# Patient Record
Sex: FEMALE | Race: WHITE | NOT HISPANIC OR LATINO | Employment: OTHER | URBAN - METROPOLITAN AREA
[De-identification: names, ages, dates, MRNs, and addresses within clinical notes are randomized per-mention and may not be internally consistent; named-entity substitution may affect disease eponyms.]

---

## 2018-01-15 ENCOUNTER — OFFICE VISIT CONVERTED (OUTPATIENT)
Dept: FAMILY MEDICINE CLINIC | Facility: CLINIC | Age: 83
End: 2018-01-15
Attending: FAMILY MEDICINE

## 2018-01-25 ENCOUNTER — OFFICE VISIT CONVERTED (OUTPATIENT)
Dept: SURGERY | Facility: CLINIC | Age: 83
End: 2018-01-25
Attending: NURSE PRACTITIONER

## 2018-01-25 ENCOUNTER — CONVERSION ENCOUNTER (OUTPATIENT)
Dept: SURGERY | Facility: CLINIC | Age: 83
End: 2018-01-25

## 2018-02-23 ENCOUNTER — OFFICE VISIT CONVERTED (OUTPATIENT)
Dept: FAMILY MEDICINE CLINIC | Facility: CLINIC | Age: 83
End: 2018-02-23
Attending: PHYSICIAN ASSISTANT

## 2018-02-26 ENCOUNTER — OFFICE VISIT CONVERTED (OUTPATIENT)
Dept: CARDIOLOGY | Facility: CLINIC | Age: 83
End: 2018-02-26
Attending: INTERNAL MEDICINE

## 2018-03-14 ENCOUNTER — OFFICE VISIT CONVERTED (OUTPATIENT)
Dept: NEUROLOGY | Facility: CLINIC | Age: 83
End: 2018-03-14
Attending: PSYCHIATRY & NEUROLOGY

## 2018-03-14 ENCOUNTER — CONVERSION ENCOUNTER (OUTPATIENT)
Dept: NEUROLOGY | Facility: CLINIC | Age: 83
End: 2018-03-14

## 2018-03-20 ENCOUNTER — OFFICE VISIT CONVERTED (OUTPATIENT)
Dept: FAMILY MEDICINE CLINIC | Facility: CLINIC | Age: 83
End: 2018-03-20
Attending: FAMILY MEDICINE

## 2018-04-10 ENCOUNTER — OFFICE VISIT CONVERTED (OUTPATIENT)
Dept: SURGERY | Facility: CLINIC | Age: 83
End: 2018-04-10
Attending: NURSE PRACTITIONER

## 2018-04-23 ENCOUNTER — CONVERSION ENCOUNTER (OUTPATIENT)
Dept: FAMILY MEDICINE CLINIC | Facility: CLINIC | Age: 83
End: 2018-04-23

## 2018-04-23 ENCOUNTER — OFFICE VISIT CONVERTED (OUTPATIENT)
Dept: FAMILY MEDICINE CLINIC | Facility: CLINIC | Age: 83
End: 2018-04-23
Attending: NURSE PRACTITIONER

## 2018-06-15 ENCOUNTER — CONVERSION ENCOUNTER (OUTPATIENT)
Dept: FAMILY MEDICINE CLINIC | Facility: CLINIC | Age: 83
End: 2018-06-15

## 2018-06-15 ENCOUNTER — OFFICE VISIT CONVERTED (OUTPATIENT)
Dept: FAMILY MEDICINE CLINIC | Facility: CLINIC | Age: 83
End: 2018-06-15
Attending: PHYSICIAN ASSISTANT

## 2018-07-06 ENCOUNTER — OFFICE VISIT CONVERTED (OUTPATIENT)
Dept: FAMILY MEDICINE CLINIC | Facility: CLINIC | Age: 83
End: 2018-07-06
Attending: FAMILY MEDICINE

## 2018-07-25 ENCOUNTER — CONVERSION ENCOUNTER (OUTPATIENT)
Dept: CARDIOLOGY | Facility: CLINIC | Age: 83
End: 2018-07-25

## 2018-07-25 ENCOUNTER — CONVERSION ENCOUNTER (OUTPATIENT)
Dept: CARDIOLOGY | Facility: CLINIC | Age: 83
End: 2018-07-25
Attending: INTERNAL MEDICINE

## 2018-08-14 ENCOUNTER — CONVERSION ENCOUNTER (OUTPATIENT)
Dept: FAMILY MEDICINE CLINIC | Facility: CLINIC | Age: 83
End: 2018-08-14

## 2018-08-14 ENCOUNTER — OFFICE VISIT CONVERTED (OUTPATIENT)
Dept: FAMILY MEDICINE CLINIC | Facility: CLINIC | Age: 83
End: 2018-08-14
Attending: FAMILY MEDICINE

## 2018-10-15 ENCOUNTER — CONVERSION ENCOUNTER (OUTPATIENT)
Dept: OTHER | Facility: HOSPITAL | Age: 83
End: 2018-10-15

## 2018-10-15 ENCOUNTER — OFFICE VISIT CONVERTED (OUTPATIENT)
Dept: FAMILY MEDICINE CLINIC | Facility: CLINIC | Age: 83
End: 2018-10-15
Attending: FAMILY MEDICINE

## 2018-10-19 ENCOUNTER — OFFICE VISIT CONVERTED (OUTPATIENT)
Dept: FAMILY MEDICINE CLINIC | Facility: CLINIC | Age: 83
End: 2018-10-19
Attending: FAMILY MEDICINE

## 2018-10-25 ENCOUNTER — OFFICE VISIT CONVERTED (OUTPATIENT)
Dept: SURGERY | Facility: CLINIC | Age: 83
End: 2018-10-25
Attending: NURSE PRACTITIONER

## 2018-10-30 ENCOUNTER — CONVERSION ENCOUNTER (OUTPATIENT)
Dept: SURGERY | Facility: CLINIC | Age: 83
End: 2018-10-30

## 2018-10-30 ENCOUNTER — OFFICE VISIT CONVERTED (OUTPATIENT)
Dept: SURGERY | Facility: CLINIC | Age: 83
End: 2018-10-30
Attending: SURGERY

## 2018-11-13 ENCOUNTER — OFFICE VISIT CONVERTED (OUTPATIENT)
Dept: FAMILY MEDICINE CLINIC | Facility: CLINIC | Age: 83
End: 2018-11-13
Attending: FAMILY MEDICINE

## 2019-01-16 ENCOUNTER — OFFICE VISIT CONVERTED (OUTPATIENT)
Dept: FAMILY MEDICINE CLINIC | Facility: CLINIC | Age: 84
End: 2019-01-16
Attending: NURSE PRACTITIONER

## 2019-01-16 ENCOUNTER — CONVERSION ENCOUNTER (OUTPATIENT)
Dept: FAMILY MEDICINE CLINIC | Facility: CLINIC | Age: 84
End: 2019-01-16

## 2019-01-28 ENCOUNTER — HOSPITAL ENCOUNTER (OUTPATIENT)
Dept: LAB | Facility: HOSPITAL | Age: 84
Discharge: HOME | End: 2019-01-28
Attending: INTERNAL MEDICINE

## 2019-01-28 LAB
ALBUMIN SERPL-MCNC: 4.1 G/DL (ref 3.5–5)
ALBUMIN/GLOB SERPL: 1.2 {RATIO} (ref 1.4–2.6)
ALP SERPL-CCNC: 50 U/L (ref 43–160)
ALT SERPL-CCNC: 17 U/L (ref 10–40)
ANION GAP SERPL CALC-SCNC: 16 MMOL/L (ref 8–19)
AST SERPL-CCNC: 22 U/L (ref 15–50)
BILIRUB SERPL-MCNC: 0.37 MG/DL (ref 0.2–1.3)
BUN SERPL-MCNC: 28 MG/DL (ref 5–25)
BUN/CREAT SERPL: 22 {RATIO} (ref 6–20)
CALCIUM SERPL-MCNC: 9.5 MG/DL (ref 8.7–10.4)
CHLORIDE SERPL-SCNC: 105 MMOL/L (ref 99–111)
CONV CO2: 25 MMOL/L (ref 22–32)
CONV TOTAL PROTEIN: 7.5 G/DL (ref 6.3–8.2)
CREAT UR-MCNC: 1.3 MG/DL (ref 0.5–0.9)
GFR SERPLBLD BASED ON 1.73 SQ M-ARVRAT: 38 ML/MIN/{1.73_M2}
GLOBULIN UR ELPH-MCNC: 3.4 G/DL (ref 2–3.5)
GLUCOSE SERPL-MCNC: 96 MG/DL (ref 65–99)
OSMOLALITY SERPL CALC.SUM OF ELEC: 299 MOSM/KG (ref 273–304)
POTASSIUM SERPL-SCNC: 3.9 MMOL/L (ref 3.5–5.3)
SODIUM SERPL-SCNC: 142 MMOL/L (ref 135–147)

## 2019-02-04 ENCOUNTER — CONVERSION ENCOUNTER (OUTPATIENT)
Dept: CARDIOLOGY | Facility: CLINIC | Age: 84
End: 2019-02-04
Attending: INTERNAL MEDICINE

## 2019-02-04 ENCOUNTER — CONVERSION ENCOUNTER (OUTPATIENT)
Dept: CARDIOLOGY | Facility: CLINIC | Age: 84
End: 2019-02-04

## 2019-02-15 ENCOUNTER — OFFICE VISIT CONVERTED (OUTPATIENT)
Dept: FAMILY MEDICINE CLINIC | Facility: CLINIC | Age: 84
End: 2019-02-15
Attending: FAMILY MEDICINE

## 2019-05-13 ENCOUNTER — OFFICE VISIT CONVERTED (OUTPATIENT)
Dept: FAMILY MEDICINE CLINIC | Facility: CLINIC | Age: 84
End: 2019-05-13
Attending: NURSE PRACTITIONER

## 2019-05-13 ENCOUNTER — HOSPITAL ENCOUNTER (OUTPATIENT)
Dept: FAMILY MEDICINE CLINIC | Facility: CLINIC | Age: 84
Discharge: HOME | End: 2019-05-13
Attending: NURSE PRACTITIONER

## 2019-05-13 ENCOUNTER — CONVERSION ENCOUNTER (OUTPATIENT)
Dept: FAMILY MEDICINE CLINIC | Facility: CLINIC | Age: 84
End: 2019-05-13

## 2019-05-13 ENCOUNTER — HOSPITAL ENCOUNTER (OUTPATIENT)
Dept: LAB | Facility: HOSPITAL | Age: 84
Discharge: HOME | End: 2019-05-13
Attending: NURSE PRACTITIONER

## 2019-05-13 LAB
ALBUMIN SERPL-MCNC: 4.4 G/DL (ref 3.5–5)
ALBUMIN/GLOB SERPL: 1.2 {RATIO} (ref 1.4–2.6)
ALP SERPL-CCNC: 58 U/L (ref 43–160)
ALT SERPL-CCNC: 15 U/L (ref 10–40)
ANION GAP SERPL CALC-SCNC: 17 MMOL/L (ref 8–19)
AST SERPL-CCNC: 24 U/L (ref 15–50)
BASOPHILS # BLD AUTO: 0.06 10*3/UL (ref 0–0.2)
BASOPHILS NFR BLD AUTO: 0.9 % (ref 0–3)
BILIRUB SERPL-MCNC: 0.35 MG/DL (ref 0.2–1.3)
BUN SERPL-MCNC: 28 MG/DL (ref 5–25)
BUN/CREAT SERPL: 24 {RATIO} (ref 6–20)
CALCIUM SERPL-MCNC: 9.7 MG/DL (ref 8.7–10.4)
CHLORIDE SERPL-SCNC: 99 MMOL/L (ref 99–111)
CONV ABS IMM GRAN: 0.01 10*3/UL (ref 0–0.2)
CONV CO2: 25 MMOL/L (ref 22–32)
CONV IMMATURE GRAN: 0.1 % (ref 0–1.8)
CONV TOTAL PROTEIN: 8 G/DL (ref 6.3–8.2)
CREAT UR-MCNC: 1.19 MG/DL (ref 0.5–0.9)
DEPRECATED RDW RBC AUTO: 50.1 FL (ref 36.4–46.3)
EOSINOPHIL # BLD AUTO: 0.25 10*3/UL (ref 0–0.7)
EOSINOPHIL # BLD AUTO: 3.6 % (ref 0–7)
ERYTHROCYTE [DISTWIDTH] IN BLOOD BY AUTOMATED COUNT: 12.9 % (ref 11.7–14.4)
GFR SERPLBLD BASED ON 1.73 SQ M-ARVRAT: 42 ML/MIN/{1.73_M2}
GLOBULIN UR ELPH-MCNC: 3.6 G/DL (ref 2–3.5)
GLUCOSE SERPL-MCNC: 89 MG/DL (ref 65–99)
HBA1C MFR BLD: 13.3 G/DL (ref 12–16)
HCT VFR BLD AUTO: 41.8 % (ref 37–47)
LYMPHOCYTES # BLD AUTO: 1.72 10*3/UL (ref 1–5)
MCH RBC QN AUTO: 33.4 PG (ref 27–31)
MCHC RBC AUTO-ENTMCNC: 31.8 G/DL (ref 33–37)
MCV RBC AUTO: 105 FL (ref 81–99)
MONOCYTES # BLD AUTO: 0.73 10*3/UL (ref 0.2–1.2)
MONOCYTES NFR BLD AUTO: 10.6 % (ref 3–10)
NEUTROPHILS # BLD AUTO: 4.11 10*3/UL (ref 2–8)
NEUTROPHILS NFR BLD AUTO: 59.8 % (ref 30–85)
NRBC CBCN: 0 % (ref 0–0.7)
OSMOLALITY SERPL CALC.SUM OF ELEC: 289 MOSM/KG (ref 273–304)
PLATELET # BLD AUTO: 212 10*3/UL (ref 130–400)
PMV BLD AUTO: 11.8 FL (ref 9.4–12.3)
POTASSIUM SERPL-SCNC: 3.8 MMOL/L (ref 3.5–5.3)
RBC # BLD AUTO: 3.98 10*6/UL (ref 4.2–5.4)
SODIUM SERPL-SCNC: 137 MMOL/L (ref 135–147)
VARIANT LYMPHS NFR BLD MANUAL: 25 % (ref 20–45)
WBC # BLD AUTO: 6.88 10*3/UL (ref 4.8–10.8)

## 2019-05-14 ENCOUNTER — HOSPITAL ENCOUNTER (OUTPATIENT)
Dept: LAB | Facility: HOSPITAL | Age: 84
Discharge: HOME | End: 2019-05-14
Attending: NURSE PRACTITIONER

## 2019-05-14 LAB
BASOPHILS # BLD AUTO: 0.06 10*3/UL (ref 0–0.2)
BASOPHILS # BLD: 1 % (ref 0–3)
BASOPHILS NFR BLD AUTO: 0.8 % (ref 0–3)
CONV ABS BANDS: 0 % (ref 1–5)
CONV ABS IMM GRAN: 0.01 10*3/UL (ref 0–0.2)
CONV IMMATURE GRAN: 0.1 % (ref 0–1.8)
CONV SEGMENTED NEUTROPHILS: 74 % (ref 45–70)
DEPRECATED RDW RBC AUTO: 50.4 FL (ref 36.4–46.3)
EOSINOPHIL # BLD AUTO: 0.31 10*3/UL (ref 0–0.7)
EOSINOPHIL # BLD AUTO: 4.2 % (ref 0–7)
EOSINOPHIL NFR BLD AUTO: 1 % (ref 0–7)
ERYTHROCYTE [DISTWIDTH] IN BLOOD BY AUTOMATED COUNT: 12.9 % (ref 11.7–14.4)
FOLATE SERPL-MCNC: >20 NG/ML (ref 4.8–20)
HBA1C MFR BLD: 12.5 G/DL (ref 12–16)
HCT VFR BLD AUTO: 40.3 % (ref 37–47)
LARGE PLATELETS: ABNORMAL
LYMPHOCYTES # BLD AUTO: 1.51 10*3/UL (ref 1–5)
MACROCYTES BLD QL SMEAR: SLIGHT
MCH RBC QN AUTO: 32.6 PG (ref 27–31)
MCHC RBC AUTO-ENTMCNC: 31 G/DL (ref 33–37)
MCV RBC AUTO: 105.2 FL (ref 81–99)
MONOCYTES # BLD AUTO: 0.71 10*3/UL (ref 0.2–1.2)
MONOCYTES NFR BLD AUTO: 9.7 % (ref 3–10)
MONOCYTES NFR BLD MANUAL: 8 % (ref 3–10)
NEUTROPHILS # BLD AUTO: 4.74 10*3/UL (ref 2–8)
NEUTROPHILS NFR BLD AUTO: 64.6 % (ref 30–85)
NRBC CBCN: 0 % (ref 0–0.7)
NUC CELL # PRT MANUAL: 0 /100{WBCS}
OVALOCYTES BLD QL SMEAR: SLIGHT
PLAT MORPH BLD: NORMAL
PLATELET # BLD AUTO: 204 10*3/UL (ref 130–400)
PMV BLD AUTO: 11.7 FL (ref 9.4–12.3)
RBC # BLD AUTO: 3.83 10*6/UL (ref 4.2–5.4)
RETICS # AUTO: 0.04 10*6/UL (ref 0.02–0.08)
RETICS/RBC NFR AUTO: 1.15 % (ref 0.5–1.7)
SMALL PLATELETS BLD QL SMEAR: ADEQUATE
VARIANT LYMPHS NFR BLD MANUAL: 16 % (ref 20–45)
VARIANT LYMPHS NFR BLD MANUAL: 20.6 % (ref 20–45)
VIT B12 SERPL-MCNC: 1892 PG/ML (ref 211–911)
WBC # BLD AUTO: 7.34 10*3/UL (ref 4.8–10.8)

## 2019-05-15 LAB — BACTERIA UR CULT: NORMAL

## 2019-05-24 ENCOUNTER — OFFICE VISIT CONVERTED (OUTPATIENT)
Dept: SURGERY | Facility: CLINIC | Age: 84
End: 2019-05-24
Attending: PHYSICIAN ASSISTANT

## 2019-05-24 ENCOUNTER — CONVERSION ENCOUNTER (OUTPATIENT)
Dept: SURGERY | Facility: CLINIC | Age: 84
End: 2019-05-24

## 2019-06-06 ENCOUNTER — OFFICE VISIT CONVERTED (OUTPATIENT)
Dept: OTOLARYNGOLOGY | Facility: CLINIC | Age: 84
End: 2019-06-06
Attending: OTOLARYNGOLOGY

## 2019-06-06 ENCOUNTER — CONVERSION ENCOUNTER (OUTPATIENT)
Dept: OTOLARYNGOLOGY | Facility: CLINIC | Age: 84
End: 2019-06-06

## 2019-06-24 ENCOUNTER — OFFICE VISIT CONVERTED (OUTPATIENT)
Dept: FAMILY MEDICINE CLINIC | Facility: CLINIC | Age: 84
End: 2019-06-24
Attending: NURSE PRACTITIONER

## 2019-06-24 ENCOUNTER — CONVERSION ENCOUNTER (OUTPATIENT)
Dept: FAMILY MEDICINE CLINIC | Facility: CLINIC | Age: 84
End: 2019-06-24

## 2019-06-27 ENCOUNTER — OFFICE VISIT CONVERTED (OUTPATIENT)
Dept: SURGERY | Facility: CLINIC | Age: 84
End: 2019-06-27
Attending: PHYSICIAN ASSISTANT

## 2019-06-27 ENCOUNTER — HOSPITAL ENCOUNTER (OUTPATIENT)
Dept: SURGERY | Facility: CLINIC | Age: 84
Discharge: HOME | End: 2019-06-27
Attending: PHYSICIAN ASSISTANT

## 2019-06-29 LAB — BACTERIA UR CULT: NORMAL

## 2019-07-09 ENCOUNTER — OFFICE VISIT CONVERTED (OUTPATIENT)
Dept: SURGERY | Facility: CLINIC | Age: 84
End: 2019-07-09
Attending: PHYSICIAN ASSISTANT

## 2019-07-09 ENCOUNTER — CONVERSION ENCOUNTER (OUTPATIENT)
Dept: SURGERY | Facility: CLINIC | Age: 84
End: 2019-07-09

## 2019-07-09 ENCOUNTER — HOSPITAL ENCOUNTER (OUTPATIENT)
Dept: SURGERY | Facility: CLINIC | Age: 84
Discharge: HOME | End: 2019-07-09
Attending: PHYSICIAN ASSISTANT

## 2019-07-11 LAB — BACTERIA UR CULT: NORMAL

## 2019-07-29 ENCOUNTER — CONVERSION ENCOUNTER (OUTPATIENT)
Dept: SURGERY | Facility: CLINIC | Age: 84
End: 2019-07-29

## 2019-07-29 ENCOUNTER — OFFICE VISIT CONVERTED (OUTPATIENT)
Dept: UROLOGY | Facility: CLINIC | Age: 84
End: 2019-07-29
Attending: UROLOGY

## 2019-07-29 ENCOUNTER — HOSPITAL ENCOUNTER (OUTPATIENT)
Dept: LAB | Facility: HOSPITAL | Age: 84
Discharge: HOME | End: 2019-07-29
Attending: NURSE PRACTITIONER

## 2019-07-29 LAB
ALBUMIN SERPL-MCNC: 4.3 G/DL (ref 3.5–5)
ALBUMIN/GLOB SERPL: 1.2 {RATIO} (ref 1.4–2.6)
ALP SERPL-CCNC: 56 U/L (ref 43–160)
ALT SERPL-CCNC: 15 U/L (ref 10–40)
ANION GAP SERPL CALC-SCNC: 18 MMOL/L (ref 8–19)
AST SERPL-CCNC: 23 U/L (ref 15–50)
BILIRUB SERPL-MCNC: 0.65 MG/DL (ref 0.2–1.3)
BUN SERPL-MCNC: 27 MG/DL (ref 5–25)
BUN/CREAT SERPL: 19 {RATIO} (ref 6–20)
CALCIUM SERPL-MCNC: 9.9 MG/DL (ref 8.7–10.4)
CHLORIDE SERPL-SCNC: 102 MMOL/L (ref 99–111)
CONV CO2: 25 MMOL/L (ref 22–32)
CONV TOTAL PROTEIN: 7.8 G/DL (ref 6.3–8.2)
CREAT UR-MCNC: 1.4 MG/DL (ref 0.5–0.9)
GFR SERPLBLD BASED ON 1.73 SQ M-ARVRAT: 34 ML/MIN/{1.73_M2}
GLOBULIN UR ELPH-MCNC: 3.5 G/DL (ref 2–3.5)
GLUCOSE SERPL-MCNC: 105 MG/DL (ref 65–99)
OSMOLALITY SERPL CALC.SUM OF ELEC: 297 MOSM/KG (ref 273–304)
POTASSIUM SERPL-SCNC: 3.9 MMOL/L (ref 3.5–5.3)
SODIUM SERPL-SCNC: 141 MMOL/L (ref 135–147)

## 2019-08-01 ENCOUNTER — OFFICE VISIT CONVERTED (OUTPATIENT)
Dept: FAMILY MEDICINE CLINIC | Facility: CLINIC | Age: 84
End: 2019-08-01
Attending: NURSE PRACTITIONER

## 2019-08-02 ENCOUNTER — CONVERSION ENCOUNTER (OUTPATIENT)
Dept: FAMILY MEDICINE CLINIC | Facility: CLINIC | Age: 84
End: 2019-08-02

## 2019-08-05 ENCOUNTER — OFFICE VISIT CONVERTED (OUTPATIENT)
Dept: CARDIOLOGY | Facility: CLINIC | Age: 84
End: 2019-08-05
Attending: INTERNAL MEDICINE

## 2019-08-06 ENCOUNTER — HOSPITAL ENCOUNTER (OUTPATIENT)
Dept: CARDIOLOGY | Facility: HOSPITAL | Age: 84
Discharge: HOME | End: 2019-08-06
Attending: NURSE PRACTITIONER

## 2019-09-04 ENCOUNTER — HOSPITAL ENCOUNTER (OUTPATIENT)
Dept: LAB | Facility: HOSPITAL | Age: 84
Discharge: HOME | End: 2019-09-04
Attending: INTERNAL MEDICINE

## 2019-09-04 LAB
ANION GAP SERPL CALC-SCNC: 18 MMOL/L (ref 8–19)
BUN SERPL-MCNC: 27 MG/DL (ref 5–25)
BUN/CREAT SERPL: 23 {RATIO} (ref 6–20)
CALCIUM SERPL-MCNC: 10 MG/DL (ref 8.7–10.4)
CHLORIDE SERPL-SCNC: 101 MMOL/L (ref 99–111)
CONV CO2: 26 MMOL/L (ref 22–32)
CREAT UR-MCNC: 1.17 MG/DL (ref 0.5–0.9)
GFR SERPLBLD BASED ON 1.73 SQ M-ARVRAT: 43 ML/MIN/{1.73_M2}
GLUCOSE SERPL-MCNC: 90 MG/DL (ref 65–99)
OSMOLALITY SERPL CALC.SUM OF ELEC: 295 MOSM/KG (ref 273–304)
POTASSIUM SERPL-SCNC: 4.5 MMOL/L (ref 3.5–5.3)
SODIUM SERPL-SCNC: 140 MMOL/L (ref 135–147)

## 2019-09-18 ENCOUNTER — HOSPITAL ENCOUNTER (OUTPATIENT)
Dept: FAMILY MEDICINE CLINIC | Facility: CLINIC | Age: 84
Discharge: HOME | End: 2019-09-18
Attending: NURSE PRACTITIONER

## 2019-09-18 ENCOUNTER — CONVERSION ENCOUNTER (OUTPATIENT)
Dept: FAMILY MEDICINE CLINIC | Facility: CLINIC | Age: 84
End: 2019-09-18

## 2019-09-18 ENCOUNTER — OFFICE VISIT CONVERTED (OUTPATIENT)
Dept: FAMILY MEDICINE CLINIC | Facility: CLINIC | Age: 84
End: 2019-09-18
Attending: NURSE PRACTITIONER

## 2019-09-19 ENCOUNTER — HOSPITAL ENCOUNTER (OUTPATIENT)
Dept: LAB | Facility: HOSPITAL | Age: 84
Discharge: HOME | End: 2019-09-19
Attending: NURSE PRACTITIONER

## 2019-09-19 LAB
ALBUMIN SERPL-MCNC: 4.3 G/DL (ref 3.5–5)
ALBUMIN/GLOB SERPL: 1.3 {RATIO} (ref 1.4–2.6)
ALP SERPL-CCNC: 56 U/L (ref 43–160)
ALT SERPL-CCNC: 14 U/L (ref 10–40)
ANION GAP SERPL CALC-SCNC: 17 MMOL/L (ref 8–19)
AST SERPL-CCNC: 23 U/L (ref 15–50)
BILIRUB SERPL-MCNC: 0.31 MG/DL (ref 0.2–1.3)
BUN SERPL-MCNC: 28 MG/DL (ref 5–25)
BUN/CREAT SERPL: 22 {RATIO} (ref 6–20)
CALCIUM SERPL-MCNC: 9.9 MG/DL (ref 8.7–10.4)
CHLORIDE SERPL-SCNC: 103 MMOL/L (ref 99–111)
CHOLEST SERPL-MCNC: 178 MG/DL (ref 107–200)
CHOLEST/HDLC SERPL: 3.7 {RATIO} (ref 3–6)
CONV BILI, CONJUGATED: <0.2 MG/DL (ref 0–0.6)
CONV CO2: 24 MMOL/L (ref 22–32)
CONV TOTAL PROTEIN: 7.6 G/DL (ref 6.3–8.2)
CONV UNCONJUGATED BILIRUBIN: 0.1 MG/DL (ref 0–1.1)
CREAT UR-MCNC: 1.27 MG/DL (ref 0.5–0.9)
GFR SERPLBLD BASED ON 1.73 SQ M-ARVRAT: 38 ML/MIN/{1.73_M2}
GLOBULIN UR ELPH-MCNC: 3.3 G/DL (ref 2–3.5)
GLUCOSE SERPL-MCNC: 95 MG/DL (ref 65–99)
HDLC SERPL-MCNC: 48 MG/DL (ref 40–60)
LDLC SERPL CALC-MCNC: 97 MG/DL (ref 70–100)
OSMOLALITY SERPL CALC.SUM OF ELEC: 295 MOSM/KG (ref 273–304)
POTASSIUM SERPL-SCNC: 4.1 MMOL/L (ref 3.5–5.3)
SODIUM SERPL-SCNC: 140 MMOL/L (ref 135–147)
TRIGL SERPL-MCNC: 167 MG/DL (ref 40–150)
VLDLC SERPL-MCNC: 33 MG/DL (ref 5–37)

## 2019-09-20 ENCOUNTER — HOSPITAL ENCOUNTER (OUTPATIENT)
Dept: GENERAL RADIOLOGY | Facility: HOSPITAL | Age: 84
Discharge: HOME | End: 2019-09-20
Attending: NURSE PRACTITIONER

## 2019-09-20 LAB — BACTERIA UR CULT: NORMAL

## 2019-09-30 ENCOUNTER — OFFICE VISIT CONVERTED (OUTPATIENT)
Dept: FAMILY MEDICINE CLINIC | Facility: CLINIC | Age: 84
End: 2019-09-30
Attending: NURSE PRACTITIONER

## 2019-09-30 ENCOUNTER — HOSPITAL ENCOUNTER (OUTPATIENT)
Dept: FAMILY MEDICINE CLINIC | Facility: CLINIC | Age: 84
Discharge: HOME | End: 2019-09-30
Attending: NURSE PRACTITIONER

## 2019-10-02 LAB — BACTERIA UR CULT: NORMAL

## 2019-10-28 ENCOUNTER — CONVERSION ENCOUNTER (OUTPATIENT)
Dept: SURGERY | Facility: CLINIC | Age: 84
End: 2019-10-28

## 2019-10-28 ENCOUNTER — OFFICE VISIT CONVERTED (OUTPATIENT)
Dept: UROLOGY | Facility: CLINIC | Age: 84
End: 2019-10-28
Attending: UROLOGY

## 2019-10-30 ENCOUNTER — OFFICE VISIT CONVERTED (OUTPATIENT)
Dept: FAMILY MEDICINE CLINIC | Facility: CLINIC | Age: 84
End: 2019-10-30
Attending: NURSE PRACTITIONER

## 2019-10-30 ENCOUNTER — CONVERSION ENCOUNTER (OUTPATIENT)
Dept: FAMILY MEDICINE CLINIC | Facility: CLINIC | Age: 84
End: 2019-10-30

## 2019-10-31 ENCOUNTER — HOSPITAL ENCOUNTER (OUTPATIENT)
Dept: FAMILY MEDICINE CLINIC | Facility: CLINIC | Age: 84
Discharge: HOME | End: 2019-10-31
Attending: NURSE PRACTITIONER

## 2019-11-02 LAB — BACTERIA UR CULT: NORMAL

## 2019-11-06 ENCOUNTER — HOSPITAL ENCOUNTER (OUTPATIENT)
Dept: GENERAL RADIOLOGY | Facility: HOSPITAL | Age: 84
Discharge: HOME | End: 2019-11-06
Attending: NURSE PRACTITIONER

## 2019-11-13 ENCOUNTER — HOSPITAL ENCOUNTER (OUTPATIENT)
Dept: LAB | Facility: HOSPITAL | Age: 84
Discharge: HOME | End: 2019-11-13
Attending: NURSE PRACTITIONER

## 2019-11-13 ENCOUNTER — OFFICE VISIT CONVERTED (OUTPATIENT)
Dept: FAMILY MEDICINE CLINIC | Facility: CLINIC | Age: 84
End: 2019-11-13
Attending: NURSE PRACTITIONER

## 2019-11-13 LAB
25(OH)D3 SERPL-MCNC: 77.7 NG/ML (ref 30–100)
ALBUMIN SERPL-MCNC: 4.6 G/DL (ref 3.5–5)
ALBUMIN/GLOB SERPL: 1.2 {RATIO} (ref 1.4–2.6)
ALP SERPL-CCNC: 56 U/L (ref 43–160)
ALT SERPL-CCNC: 12 U/L (ref 10–40)
ANION GAP SERPL CALC-SCNC: 21 MMOL/L (ref 8–19)
AST SERPL-CCNC: 22 U/L (ref 15–50)
BASOPHILS # BLD AUTO: 0.05 10*3/UL (ref 0–0.2)
BASOPHILS NFR BLD AUTO: 0.7 % (ref 0–3)
BILIRUB SERPL-MCNC: 0.33 MG/DL (ref 0.2–1.3)
BUN SERPL-MCNC: 22 MG/DL (ref 5–25)
BUN/CREAT SERPL: 17 {RATIO} (ref 6–20)
CALCIUM SERPL-MCNC: 10.3 MG/DL (ref 8.7–10.4)
CHLORIDE SERPL-SCNC: 99 MMOL/L (ref 99–111)
CONV ABS IMM GRAN: 0.03 10*3/UL (ref 0–0.2)
CONV CO2: 25 MMOL/L (ref 22–32)
CONV IMMATURE GRAN: 0.4 % (ref 0–1.8)
CONV TOTAL PROTEIN: 8.3 G/DL (ref 6.3–8.2)
CREAT UR-MCNC: 1.28 MG/DL (ref 0.5–0.9)
DEPRECATED RDW RBC AUTO: 47.8 FL (ref 36.4–46.3)
EOSINOPHIL # BLD AUTO: 0.04 10*3/UL (ref 0–0.7)
EOSINOPHIL # BLD AUTO: 0.6 % (ref 0–7)
ERYTHROCYTE [DISTWIDTH] IN BLOOD BY AUTOMATED COUNT: 12.1 % (ref 11.7–14.4)
GFR SERPLBLD BASED ON 1.73 SQ M-ARVRAT: 38 ML/MIN/{1.73_M2}
GLOBULIN UR ELPH-MCNC: 3.7 G/DL (ref 2–3.5)
GLUCOSE SERPL-MCNC: 96 MG/DL (ref 65–99)
HCT VFR BLD AUTO: 47.2 % (ref 37–47)
HGB BLD-MCNC: 14.8 G/DL (ref 12–16)
LYMPHOCYTES # BLD AUTO: 1.24 10*3/UL (ref 1–5)
LYMPHOCYTES NFR BLD AUTO: 17.1 % (ref 20–45)
MCH RBC QN AUTO: 33.2 PG (ref 27–31)
MCHC RBC AUTO-ENTMCNC: 31.4 G/DL (ref 33–37)
MCV RBC AUTO: 105.8 FL (ref 81–99)
MONOCYTES # BLD AUTO: 0.7 10*3/UL (ref 0.2–1.2)
MONOCYTES NFR BLD AUTO: 9.7 % (ref 3–10)
NEUTROPHILS # BLD AUTO: 5.18 10*3/UL (ref 2–8)
NEUTROPHILS NFR BLD AUTO: 71.5 % (ref 30–85)
NRBC CBCN: 0 % (ref 0–0.7)
OSMOLALITY SERPL CALC.SUM OF ELEC: 295 MOSM/KG (ref 273–304)
PLATELET # BLD AUTO: 209 10*3/UL (ref 130–400)
PMV BLD AUTO: 11.6 FL (ref 9.4–12.3)
POTASSIUM SERPL-SCNC: 4 MMOL/L (ref 3.5–5.3)
RBC # BLD AUTO: 4.46 10*6/UL (ref 4.2–5.4)
SODIUM SERPL-SCNC: 141 MMOL/L (ref 135–147)
TSH SERPL-ACNC: 3.7 M[IU]/L (ref 0.27–4.2)
VIT B12 SERPL-MCNC: 908 PG/ML (ref 211–911)
WBC # BLD AUTO: 7.24 10*3/UL (ref 4.8–10.8)

## 2019-11-14 ENCOUNTER — HOSPITAL ENCOUNTER (OUTPATIENT)
Dept: LAB | Facility: HOSPITAL | Age: 84
Discharge: HOME | End: 2019-11-14
Attending: NURSE PRACTITIONER

## 2019-11-14 LAB
ALBUMIN SERPL-MCNC: 4.6 G/DL (ref 3.5–5)
ALP SERPL-CCNC: 55 U/L (ref 43–160)
ALT SERPL-CCNC: 13 U/L (ref 10–40)
AST SERPL-CCNC: 22 U/L (ref 15–50)
BASOPHILS # BLD AUTO: 0.04 10*3/UL (ref 0–0.2)
BASOPHILS # BLD: 0 % (ref 0–3)
BASOPHILS NFR BLD AUTO: 0.8 % (ref 0–3)
BILIRUB SERPL-MCNC: 0.37 MG/DL (ref 0.2–1.3)
CONV ABS BANDS: 0 % (ref 1–5)
CONV ABS IMM GRAN: 0.01 10*3/UL (ref 0–0.2)
CONV ANISOCYTES: SLIGHT
CONV BILI, CONJUGATED: <0.2 MG/DL (ref 0–0.6)
CONV IMMATURE GRAN: 0.2 % (ref 0–1.8)
CONV SEGMENTED NEUTROPHILS: 73 % (ref 45–70)
CONV TOTAL PROTEIN: 7.9 G/DL (ref 6.3–8.2)
CONV UNCONJUGATED BILIRUBIN: 0.2 MG/DL (ref 0–1.1)
DACRYOCYTES BLD QL SMEAR: SLIGHT
DEPRECATED RDW RBC AUTO: 48.2 FL (ref 36.4–46.3)
EOSINOPHIL # BLD AUTO: 0.06 10*3/UL (ref 0–0.7)
EOSINOPHIL # BLD AUTO: 1.2 % (ref 0–7)
EOSINOPHIL NFR BLD AUTO: 3 % (ref 0–7)
ERYTHROCYTE [DISTWIDTH] IN BLOOD BY AUTOMATED COUNT: 12.3 % (ref 11.7–14.4)
FOLATE SERPL-MCNC: >20 NG/ML (ref 4.8–20)
HCT VFR BLD AUTO: 44.2 % (ref 37–47)
HGB BLD-MCNC: 14.3 G/DL (ref 12–16)
LYMPHOCYTES # BLD AUTO: 0.96 10*3/UL (ref 1–5)
LYMPHOCYTES NFR BLD AUTO: 18.8 % (ref 20–45)
MACROCYTES BLD QL SMEAR: SLIGHT
MCH RBC QN AUTO: 34 PG (ref 27–31)
MCHC RBC AUTO-ENTMCNC: 32.4 G/DL (ref 33–37)
MCV RBC AUTO: 105.2 FL (ref 81–99)
MONOCYTES # BLD AUTO: 0.48 10*3/UL (ref 0.2–1.2)
MONOCYTES NFR BLD AUTO: 9.4 % (ref 3–10)
MONOCYTES NFR BLD MANUAL: 7 % (ref 3–10)
NEUTROPHILS # BLD AUTO: 3.55 10*3/UL (ref 2–8)
NEUTROPHILS NFR BLD AUTO: 69.6 % (ref 30–85)
NRBC CBCN: 0 % (ref 0–0.7)
NUC CELL # PRT MANUAL: 0 /100{WBCS}
OVALOCYTES BLD QL SMEAR: SLIGHT
PLAT MORPH BLD: NORMAL
PLATELET # BLD AUTO: 197 10*3/UL (ref 130–400)
PMV BLD AUTO: 11.5 FL (ref 9.4–12.3)
POIKILOCYTOSIS BLD QL SMEAR: SLIGHT
RBC # BLD AUTO: 4.2 10*6/UL (ref 4.2–5.4)
SMALL PLATELETS BLD QL SMEAR: ADEQUATE
VARIANT LYMPHS NFR BLD MANUAL: 17 % (ref 20–45)
WBC # BLD AUTO: 5.1 10*3/UL (ref 4.8–10.8)

## 2019-11-20 ENCOUNTER — CONVERSION ENCOUNTER (OUTPATIENT)
Dept: PODIATRY | Facility: CLINIC | Age: 84
End: 2019-11-20

## 2019-11-20 ENCOUNTER — OFFICE VISIT CONVERTED (OUTPATIENT)
Dept: PODIATRY | Facility: CLINIC | Age: 84
End: 2019-11-20
Attending: PODIATRIST

## 2019-11-20 ENCOUNTER — HOSPITAL ENCOUNTER (OUTPATIENT)
Dept: INFUSION THERAPY | Facility: HOSPITAL | Age: 84
Discharge: HOME | End: 2019-11-20
Attending: NURSE PRACTITIONER

## 2019-12-04 ENCOUNTER — CONVERSION ENCOUNTER (OUTPATIENT)
Dept: FAMILY MEDICINE CLINIC | Facility: CLINIC | Age: 84
End: 2019-12-04

## 2019-12-04 ENCOUNTER — OFFICE VISIT CONVERTED (OUTPATIENT)
Dept: FAMILY MEDICINE CLINIC | Facility: CLINIC | Age: 84
End: 2019-12-04
Attending: NURSE PRACTITIONER

## 2020-01-29 ENCOUNTER — HOSPITAL ENCOUNTER (OUTPATIENT)
Dept: LAB | Facility: HOSPITAL | Age: 85
Discharge: HOME | End: 2020-01-29
Attending: INTERNAL MEDICINE

## 2020-01-29 ENCOUNTER — OFFICE VISIT CONVERTED (OUTPATIENT)
Dept: UROLOGY | Facility: CLINIC | Age: 85
End: 2020-01-29
Attending: UROLOGY

## 2020-01-29 LAB
ALBUMIN SERPL-MCNC: 4.6 G/DL (ref 3.5–5)
ALBUMIN/GLOB SERPL: 1.3 {RATIO} (ref 1.4–2.6)
ALP SERPL-CCNC: 55 U/L (ref 43–160)
ALT SERPL-CCNC: 14 U/L (ref 10–40)
ANION GAP SERPL CALC-SCNC: 23 MMOL/L (ref 8–19)
AST SERPL-CCNC: 23 U/L (ref 15–50)
BILIRUB SERPL-MCNC: 0.61 MG/DL (ref 0.2–1.3)
BUN SERPL-MCNC: 24 MG/DL (ref 5–25)
BUN/CREAT SERPL: 19 {RATIO} (ref 6–20)
CALCIUM SERPL-MCNC: 10 MG/DL (ref 8.7–10.4)
CHLORIDE SERPL-SCNC: 103 MMOL/L (ref 99–111)
CHOLEST SERPL-MCNC: 180 MG/DL (ref 107–200)
CHOLEST/HDLC SERPL: 3.2 {RATIO} (ref 3–6)
CONV CO2: 23 MMOL/L (ref 22–32)
CONV TOTAL PROTEIN: 8.2 G/DL (ref 6.3–8.2)
CREAT UR-MCNC: 1.26 MG/DL (ref 0.5–0.9)
GFR SERPLBLD BASED ON 1.73 SQ M-ARVRAT: 39 ML/MIN/{1.73_M2}
GLOBULIN UR ELPH-MCNC: 3.6 G/DL (ref 2–3.5)
GLUCOSE SERPL-MCNC: 102 MG/DL (ref 65–99)
HDLC SERPL-MCNC: 56 MG/DL (ref 40–60)
LDLC SERPL CALC-MCNC: 95 MG/DL (ref 70–100)
OSMOLALITY SERPL CALC.SUM OF ELEC: 304 MOSM/KG (ref 273–304)
POTASSIUM SERPL-SCNC: 3.8 MMOL/L (ref 3.5–5.3)
SODIUM SERPL-SCNC: 145 MMOL/L (ref 135–147)
TRIGL SERPL-MCNC: 144 MG/DL (ref 40–150)
VLDLC SERPL-MCNC: 29 MG/DL (ref 5–37)

## 2020-02-05 ENCOUNTER — CONVERSION ENCOUNTER (OUTPATIENT)
Dept: CARDIOLOGY | Facility: CLINIC | Age: 85
End: 2020-02-05
Attending: INTERNAL MEDICINE

## 2020-02-05 ENCOUNTER — OFFICE VISIT CONVERTED (OUTPATIENT)
Dept: CARDIOLOGY | Facility: CLINIC | Age: 85
End: 2020-02-05
Attending: INTERNAL MEDICINE

## 2020-02-05 ENCOUNTER — HOSPITAL ENCOUNTER (OUTPATIENT)
Dept: LAB | Facility: HOSPITAL | Age: 85
Discharge: HOME | End: 2020-02-05
Attending: INTERNAL MEDICINE

## 2020-02-05 LAB — BNP SERPL-MCNC: 760 PG/ML (ref 0–1800)

## 2020-02-07 ENCOUNTER — HOSPITAL ENCOUNTER (OUTPATIENT)
Dept: PHYSICAL THERAPY | Facility: CLINIC | Age: 85
Setting detail: RECURRING SERIES
Discharge: HOME | End: 2020-05-07
Attending: FAMILY MEDICINE

## 2020-02-18 ENCOUNTER — HOSPITAL ENCOUNTER (OUTPATIENT)
Dept: PERIOP | Facility: HOSPITAL | Age: 85
Setting detail: HOSPITAL OUTPATIENT SURGERY
Discharge: HOME | End: 2020-02-18
Attending: UROLOGY

## 2020-02-26 ENCOUNTER — HOSPITAL ENCOUNTER (OUTPATIENT)
Dept: SURGERY | Facility: CLINIC | Age: 85
Discharge: HOME | End: 2020-02-26
Attending: UROLOGY

## 2020-02-26 ENCOUNTER — OFFICE VISIT CONVERTED (OUTPATIENT)
Dept: UROLOGY | Facility: CLINIC | Age: 85
End: 2020-02-26
Attending: UROLOGY

## 2020-02-28 LAB
AMOXICILLIN+CLAV SUSC ISLT: <=2
AMPICILLIN SUSC ISLT: 4
AMPICILLIN+SULBAC SUSC ISLT: <=2
BACTERIA UR CULT: ABNORMAL
CEFAZOLIN SUSC ISLT: <=4
CEFEPIME SUSC ISLT: <=1
CEFTAZIDIME SUSC ISLT: <=1
CEFTRIAXONE SUSC ISLT: <=1
CEFUROXIME ORAL SUSC ISLT: 4
CEFUROXIME PARENTER SUSC ISLT: 4
CIPROFLOXACIN SUSC ISLT: <=0.25
ERTAPENEM SUSC ISLT: <=0.5
GENTAMICIN SUSC ISLT: <=1
LEVOFLOXACIN SUSC ISLT: <=0.12
NITROFURANTOIN SUSC ISLT: <=16
TETRACYCLINE SUSC ISLT: <=1
TMP SMX SUSC ISLT: <=20
TOBRAMYCIN SUSC ISLT: <=1

## 2020-03-02 ENCOUNTER — CONVERSION ENCOUNTER (OUTPATIENT)
Dept: FAMILY MEDICINE CLINIC | Facility: CLINIC | Age: 85
End: 2020-03-02

## 2020-03-02 ENCOUNTER — OFFICE VISIT CONVERTED (OUTPATIENT)
Dept: FAMILY MEDICINE CLINIC | Facility: CLINIC | Age: 85
End: 2020-03-02
Attending: NURSE PRACTITIONER

## 2020-03-02 ENCOUNTER — HOSPITAL ENCOUNTER (OUTPATIENT)
Dept: FAMILY MEDICINE CLINIC | Facility: CLINIC | Age: 85
Discharge: HOME | End: 2020-03-02
Attending: NURSE PRACTITIONER

## 2020-03-04 ENCOUNTER — HOSPITAL ENCOUNTER (OUTPATIENT)
Dept: GENERAL RADIOLOGY | Facility: HOSPITAL | Age: 85
Discharge: HOME | End: 2020-03-04
Attending: NURSE PRACTITIONER

## 2020-03-04 LAB — BACTERIA UR CULT: NORMAL

## 2020-04-17 ENCOUNTER — TELEPHONE CONVERTED (OUTPATIENT)
Dept: UROLOGY | Facility: CLINIC | Age: 85
End: 2020-04-17
Attending: UROLOGY

## 2020-04-20 ENCOUNTER — HOSPITAL ENCOUNTER (OUTPATIENT)
Dept: LAB | Facility: HOSPITAL | Age: 85
Discharge: HOME | End: 2020-04-20
Attending: UROLOGY

## 2020-04-22 LAB — BACTERIA UR CULT: ABNORMAL

## 2020-05-05 ENCOUNTER — HOSPITAL ENCOUNTER (OUTPATIENT)
Dept: LAB | Facility: HOSPITAL | Age: 85
Discharge: HOME | End: 2020-05-05
Attending: UROLOGY

## 2020-05-07 LAB
AMOXICILLIN+CLAV SUSC ISLT: 16
AMPICILLIN SUSC ISLT: >=32
AMPICILLIN+SULBAC SUSC ISLT: >=32
BACTERIA UR CULT: ABNORMAL
CEFAZOLIN SUSC ISLT: 16
CEFEPIME SUSC ISLT: <=1
CEFTAZIDIME SUSC ISLT: <=1
CEFTRIAXONE SUSC ISLT: <=1
CEFUROXIME ORAL SUSC ISLT: >=64
CEFUROXIME PARENTER SUSC ISLT: >=64
CIPROFLOXACIN SUSC ISLT: <=0.25
ERTAPENEM SUSC ISLT: <=0.5
GENTAMICIN SUSC ISLT: <=1
LEVOFLOXACIN SUSC ISLT: 1
NITROFURANTOIN SUSC ISLT: >=512
TETRACYCLINE SUSC ISLT: >=16
TMP SMX SUSC ISLT: >=320
TOBRAMYCIN SUSC ISLT: <=1

## 2020-05-28 ENCOUNTER — TELEPHONE CONVERTED (OUTPATIENT)
Dept: GASTROENTEROLOGY | Facility: CLINIC | Age: 85
End: 2020-05-28
Attending: INTERNAL MEDICINE

## 2020-06-01 ENCOUNTER — HOSPITAL ENCOUNTER (OUTPATIENT)
Dept: LAB | Facility: HOSPITAL | Age: 85
Discharge: HOME | End: 2020-06-01
Attending: UROLOGY

## 2020-06-02 ENCOUNTER — HOSPITAL ENCOUNTER (OUTPATIENT)
Dept: GENERAL RADIOLOGY | Facility: HOSPITAL | Age: 85
Discharge: HOME | End: 2020-06-02
Attending: INTERNAL MEDICINE

## 2020-06-03 LAB
BACTERIA UR CULT: NORMAL
SARS-COV-2 RNA SPEC QL NAA+PROBE: NOT DETECTED

## 2020-06-17 ENCOUNTER — OFFICE VISIT CONVERTED (OUTPATIENT)
Dept: UROLOGY | Facility: CLINIC | Age: 85
End: 2020-06-17
Attending: UROLOGY

## 2020-07-10 ENCOUNTER — OFFICE VISIT CONVERTED (OUTPATIENT)
Dept: FAMILY MEDICINE CLINIC | Facility: CLINIC | Age: 85
End: 2020-07-10
Attending: NURSE PRACTITIONER

## 2020-07-16 ENCOUNTER — TELEPHONE CONVERTED (OUTPATIENT)
Dept: UROLOGY | Facility: CLINIC | Age: 85
End: 2020-07-16
Attending: UROLOGY

## 2020-07-28 ENCOUNTER — HOSPITAL ENCOUNTER (OUTPATIENT)
Dept: LAB | Facility: HOSPITAL | Age: 85
Discharge: HOME | End: 2020-07-28
Attending: NURSE PRACTITIONER

## 2020-07-28 LAB
25(OH)D3 SERPL-MCNC: 87.7 NG/ML (ref 30–100)
ALBUMIN SERPL-MCNC: 4.1 G/DL (ref 3.5–5)
ALBUMIN/GLOB SERPL: 1.3 {RATIO} (ref 1.4–2.6)
ALP SERPL-CCNC: 56 U/L (ref 43–160)
ALT SERPL-CCNC: 17 U/L (ref 10–40)
ANION GAP SERPL CALC-SCNC: 15 MMOL/L (ref 8–19)
AST SERPL-CCNC: 23 U/L (ref 15–50)
BASOPHILS # BLD AUTO: 0.04 10*3/UL (ref 0–0.2)
BASOPHILS NFR BLD AUTO: 0.7 % (ref 0–3)
BILIRUB SERPL-MCNC: 0.57 MG/DL (ref 0.2–1.3)
BUN SERPL-MCNC: 30 MG/DL (ref 5–25)
BUN/CREAT SERPL: 21 {RATIO} (ref 6–20)
CALCIUM SERPL-MCNC: 10 MG/DL (ref 8.7–10.4)
CHLORIDE SERPL-SCNC: 103 MMOL/L (ref 99–111)
CHOLEST SERPL-MCNC: 165 MG/DL (ref 107–200)
CHOLEST/HDLC SERPL: 3.4 {RATIO} (ref 3–6)
CONV ABS IMM GRAN: 0.01 10*3/UL (ref 0–0.2)
CONV CO2: 28 MMOL/L (ref 22–32)
CONV IMMATURE GRAN: 0.2 % (ref 0–1.8)
CONV TOTAL PROTEIN: 7.3 G/DL (ref 6.3–8.2)
CREAT UR-MCNC: 1.46 MG/DL (ref 0.5–0.9)
DEPRECATED RDW RBC AUTO: 47.5 FL (ref 36.4–46.3)
EOSINOPHIL # BLD AUTO: 0.15 10*3/UL (ref 0–0.7)
EOSINOPHIL # BLD AUTO: 2.8 % (ref 0–7)
ERYTHROCYTE [DISTWIDTH] IN BLOOD BY AUTOMATED COUNT: 12.1 % (ref 11.7–14.4)
GFR SERPLBLD BASED ON 1.73 SQ M-ARVRAT: 32 ML/MIN/{1.73_M2}
GLOBULIN UR ELPH-MCNC: 3.2 G/DL (ref 2–3.5)
GLUCOSE SERPL-MCNC: 97 MG/DL (ref 65–99)
HCT VFR BLD AUTO: 42.3 % (ref 37–47)
HDLC SERPL-MCNC: 48 MG/DL (ref 40–60)
HGB BLD-MCNC: 13.6 G/DL (ref 12–16)
LDLC SERPL CALC-MCNC: 83 MG/DL (ref 70–100)
LYMPHOCYTES # BLD AUTO: 1.95 10*3/UL (ref 1–5)
LYMPHOCYTES NFR BLD AUTO: 36.1 % (ref 20–45)
MCH RBC QN AUTO: 33.8 PG (ref 27–31)
MCHC RBC AUTO-ENTMCNC: 32.2 G/DL (ref 33–37)
MCV RBC AUTO: 105.2 FL (ref 81–99)
MONOCYTES # BLD AUTO: 0.63 10*3/UL (ref 0.2–1.2)
MONOCYTES NFR BLD AUTO: 11.7 % (ref 3–10)
NEUTROPHILS # BLD AUTO: 2.62 10*3/UL (ref 2–8)
NEUTROPHILS NFR BLD AUTO: 48.5 % (ref 30–85)
NRBC CBCN: 0 % (ref 0–0.7)
OSMOLALITY SERPL CALC.SUM OF ELEC: 300 MOSM/KG (ref 273–304)
PLATELET # BLD AUTO: 189 10*3/UL (ref 130–400)
PMV BLD AUTO: 11.2 FL (ref 9.4–12.3)
POTASSIUM SERPL-SCNC: 3.5 MMOL/L (ref 3.5–5.3)
RBC # BLD AUTO: 4.02 10*6/UL (ref 4.2–5.4)
SODIUM SERPL-SCNC: 142 MMOL/L (ref 135–147)
TRIGL SERPL-MCNC: 168 MG/DL (ref 40–150)
TSH SERPL-ACNC: 2.21 M[IU]/L (ref 0.27–4.2)
VIT B12 SERPL-MCNC: 1019 PG/ML (ref 211–911)
VLDLC SERPL-MCNC: 34 MG/DL (ref 5–37)
WBC # BLD AUTO: 5.4 10*3/UL (ref 4.8–10.8)

## 2020-07-30 ENCOUNTER — TELEMEDICINE CONVERTED (OUTPATIENT)
Dept: GASTROENTEROLOGY | Facility: CLINIC | Age: 85
End: 2020-07-30
Attending: PHYSICIAN ASSISTANT

## 2020-08-04 ENCOUNTER — OFFICE VISIT CONVERTED (OUTPATIENT)
Dept: SURGERY | Facility: CLINIC | Age: 85
End: 2020-08-04
Attending: SURGERY

## 2020-08-04 ENCOUNTER — CONVERSION ENCOUNTER (OUTPATIENT)
Dept: SURGERY | Facility: CLINIC | Age: 85
End: 2020-08-04

## 2020-08-05 ENCOUNTER — OFFICE VISIT CONVERTED (OUTPATIENT)
Dept: CARDIOLOGY | Facility: CLINIC | Age: 85
End: 2020-08-05
Attending: INTERNAL MEDICINE

## 2020-08-12 ENCOUNTER — HOSPITAL ENCOUNTER (OUTPATIENT)
Dept: FAMILY MEDICINE CLINIC | Facility: CLINIC | Age: 85
Discharge: HOME | End: 2020-08-12
Attending: NURSE PRACTITIONER

## 2020-08-12 ENCOUNTER — TELEPHONE CONVERTED (OUTPATIENT)
Dept: FAMILY MEDICINE CLINIC | Facility: CLINIC | Age: 85
End: 2020-08-12
Attending: NURSE PRACTITIONER

## 2020-08-14 LAB — BACTERIA UR CULT: NORMAL

## 2020-08-15 ENCOUNTER — HOSPITAL ENCOUNTER (OUTPATIENT)
Dept: PREADMISSION TESTING | Facility: HOSPITAL | Age: 85
Discharge: HOME | End: 2020-08-15
Attending: SURGERY

## 2020-08-16 LAB — SARS-COV-2 RNA SPEC QL NAA+PROBE: NOT DETECTED

## 2020-08-20 ENCOUNTER — HOSPITAL ENCOUNTER (OUTPATIENT)
Dept: PERIOP | Facility: HOSPITAL | Age: 85
Setting detail: HOSPITAL OUTPATIENT SURGERY
Discharge: HOME | End: 2020-08-20
Attending: SURGERY

## 2020-09-04 ENCOUNTER — OFFICE VISIT CONVERTED (OUTPATIENT)
Dept: SURGERY | Facility: CLINIC | Age: 85
End: 2020-09-04
Attending: SURGERY

## 2020-09-04 ENCOUNTER — CONVERSION ENCOUNTER (OUTPATIENT)
Dept: SURGERY | Facility: CLINIC | Age: 85
End: 2020-09-04

## 2020-09-15 ENCOUNTER — HOSPITAL ENCOUNTER (OUTPATIENT)
Dept: LAB | Facility: HOSPITAL | Age: 85
Discharge: HOME | End: 2020-09-15
Attending: NURSE PRACTITIONER

## 2020-09-15 ENCOUNTER — OFFICE VISIT CONVERTED (OUTPATIENT)
Dept: SURGERY | Facility: CLINIC | Age: 85
End: 2020-09-15
Attending: SURGERY

## 2020-09-15 LAB
ANION GAP SERPL CALC-SCNC: 16 MMOL/L (ref 8–19)
BUN SERPL-MCNC: 26 MG/DL (ref 5–25)
BUN/CREAT SERPL: 18 {RATIO} (ref 6–20)
CALCIUM SERPL-MCNC: 9.6 MG/DL (ref 8.7–10.4)
CHLORIDE SERPL-SCNC: 102 MMOL/L (ref 99–111)
CONV CO2: 26 MMOL/L (ref 22–32)
CREAT UR-MCNC: 1.42 MG/DL (ref 0.5–0.9)
GFR SERPLBLD BASED ON 1.73 SQ M-ARVRAT: 33 ML/MIN/{1.73_M2}
GLUCOSE SERPL-MCNC: 97 MG/DL (ref 65–99)
OSMOLALITY SERPL CALC.SUM OF ELEC: 295 MOSM/KG (ref 273–304)
POTASSIUM SERPL-SCNC: 4.2 MMOL/L (ref 3.5–5.3)
SODIUM SERPL-SCNC: 140 MMOL/L (ref 135–147)

## 2020-09-25 ENCOUNTER — HOSPITAL ENCOUNTER (OUTPATIENT)
Dept: PREADMISSION TESTING | Facility: HOSPITAL | Age: 85
Discharge: HOME | End: 2020-09-25
Attending: SURGERY

## 2020-09-26 ENCOUNTER — HOSPITAL ENCOUNTER (OUTPATIENT)
Dept: PREADMISSION TESTING | Facility: HOSPITAL | Age: 85
Discharge: HOME | End: 2020-09-26
Attending: SURGERY

## 2020-09-27 LAB — SARS-COV-2 RNA SPEC QL NAA+PROBE: NOT DETECTED

## 2020-09-29 ENCOUNTER — CONVERSION ENCOUNTER (OUTPATIENT)
Dept: FAMILY MEDICINE CLINIC | Facility: CLINIC | Age: 85
End: 2020-09-29

## 2020-09-29 ENCOUNTER — HOSPITAL ENCOUNTER (OUTPATIENT)
Dept: FAMILY MEDICINE CLINIC | Facility: CLINIC | Age: 85
Discharge: HOME | End: 2020-09-29
Attending: PSYCHIATRY & NEUROLOGY

## 2020-09-29 ENCOUNTER — OFFICE VISIT CONVERTED (OUTPATIENT)
Dept: FAMILY MEDICINE CLINIC | Facility: CLINIC | Age: 85
End: 2020-09-29
Attending: INTERNAL MEDICINE

## 2020-10-01 ENCOUNTER — HOSPITAL ENCOUNTER (OUTPATIENT)
Dept: PERIOP | Facility: HOSPITAL | Age: 85
Setting detail: HOSPITAL OUTPATIENT SURGERY
Discharge: HOME | End: 2020-10-01
Attending: SURGERY

## 2020-10-01 LAB — BACTERIA UR CULT: NORMAL

## 2020-10-16 ENCOUNTER — OFFICE VISIT CONVERTED (OUTPATIENT)
Dept: SURGERY | Facility: CLINIC | Age: 85
End: 2020-10-16
Attending: SURGERY

## 2020-10-19 ENCOUNTER — OFFICE VISIT CONVERTED (OUTPATIENT)
Dept: PODIATRY | Facility: CLINIC | Age: 85
End: 2020-10-19
Attending: PODIATRIST

## 2020-10-19 ENCOUNTER — CONVERSION ENCOUNTER (OUTPATIENT)
Dept: PODIATRY | Facility: CLINIC | Age: 85
End: 2020-10-19

## 2020-10-22 ENCOUNTER — OFFICE VISIT CONVERTED (OUTPATIENT)
Dept: UROLOGY | Facility: CLINIC | Age: 85
End: 2020-10-22
Attending: NURSE PRACTITIONER

## 2020-11-11 ENCOUNTER — CONVERSION ENCOUNTER (OUTPATIENT)
Dept: FAMILY MEDICINE CLINIC | Facility: CLINIC | Age: 85
End: 2020-11-11

## 2020-11-11 ENCOUNTER — OFFICE VISIT CONVERTED (OUTPATIENT)
Dept: FAMILY MEDICINE CLINIC | Facility: CLINIC | Age: 85
End: 2020-11-11
Attending: NURSE PRACTITIONER

## 2020-11-17 ENCOUNTER — HOSPITAL ENCOUNTER (OUTPATIENT)
Dept: GENERAL RADIOLOGY | Facility: HOSPITAL | Age: 85
Discharge: HOME | End: 2020-11-17
Attending: NURSE PRACTITIONER

## 2020-11-24 ENCOUNTER — HOSPITAL ENCOUNTER (OUTPATIENT)
Dept: GENERAL RADIOLOGY | Facility: HOSPITAL | Age: 85
Discharge: HOME | End: 2020-11-24
Attending: NURSE PRACTITIONER

## 2020-12-11 ENCOUNTER — CONVERSION ENCOUNTER (OUTPATIENT)
Dept: OTHER | Facility: HOSPITAL | Age: 85
End: 2020-12-11

## 2020-12-11 ENCOUNTER — OFFICE VISIT CONVERTED (OUTPATIENT)
Dept: FAMILY MEDICINE CLINIC | Facility: CLINIC | Age: 85
End: 2020-12-11
Attending: NURSE PRACTITIONER

## 2020-12-14 ENCOUNTER — HOSPITAL ENCOUNTER (OUTPATIENT)
Dept: LAB | Facility: HOSPITAL | Age: 85
Discharge: HOME | End: 2020-12-14
Attending: NURSE PRACTITIONER

## 2020-12-14 LAB
25(OH)D3 SERPL-MCNC: 90 NG/ML (ref 30–100)
ALBUMIN SERPL-MCNC: 4.3 G/DL (ref 3.5–5)
ALBUMIN/GLOB SERPL: 1.1 {RATIO} (ref 1.4–2.6)
ALP SERPL-CCNC: 69 U/L (ref 43–160)
ALT SERPL-CCNC: 13 U/L (ref 10–40)
ANION GAP SERPL CALC-SCNC: 13 MMOL/L (ref 8–19)
AST SERPL-CCNC: 23 U/L (ref 15–50)
BASOPHILS # BLD AUTO: 0.06 10*3/UL (ref 0–0.2)
BASOPHILS NFR BLD AUTO: 1 % (ref 0–3)
BILIRUB SERPL-MCNC: 0.53 MG/DL (ref 0.2–1.3)
BUN SERPL-MCNC: 26 MG/DL (ref 5–25)
BUN/CREAT SERPL: 22 {RATIO} (ref 6–20)
CALCIUM SERPL-MCNC: 10 MG/DL (ref 8.7–10.4)
CHLORIDE SERPL-SCNC: 101 MMOL/L (ref 99–111)
CHOLEST SERPL-MCNC: 188 MG/DL (ref 107–200)
CHOLEST/HDLC SERPL: 4.5 {RATIO} (ref 3–6)
CONV ABS IMM GRAN: 0.01 10*3/UL (ref 0–0.2)
CONV CO2: 29 MMOL/L (ref 22–32)
CONV IMMATURE GRAN: 0.2 % (ref 0–1.8)
CONV TOTAL PROTEIN: 8.1 G/DL (ref 6.3–8.2)
CREAT UR-MCNC: 1.18 MG/DL (ref 0.5–0.9)
DEPRECATED RDW RBC AUTO: 47.8 FL (ref 36.4–46.3)
EOSINOPHIL # BLD AUTO: 0.32 10*3/UL (ref 0–0.7)
EOSINOPHIL # BLD AUTO: 5.4 % (ref 0–7)
ERYTHROCYTE [DISTWIDTH] IN BLOOD BY AUTOMATED COUNT: 12.3 % (ref 11.7–14.4)
GFR SERPLBLD BASED ON 1.73 SQ M-ARVRAT: 42 ML/MIN/{1.73_M2}
GLOBULIN UR ELPH-MCNC: 3.8 G/DL (ref 2–3.5)
GLUCOSE SERPL-MCNC: 95 MG/DL (ref 65–99)
HCT VFR BLD AUTO: 45.3 % (ref 37–47)
HDLC SERPL-MCNC: 42 MG/DL (ref 40–60)
HGB BLD-MCNC: 14.7 G/DL (ref 12–16)
LDLC SERPL CALC-MCNC: 92 MG/DL (ref 70–100)
LYMPHOCYTES # BLD AUTO: 1.51 10*3/UL (ref 1–5)
LYMPHOCYTES NFR BLD AUTO: 25.7 % (ref 20–45)
MCH RBC QN AUTO: 33.8 PG (ref 27–31)
MCHC RBC AUTO-ENTMCNC: 32.5 G/DL (ref 33–37)
MCV RBC AUTO: 104.1 FL (ref 81–99)
MONOCYTES # BLD AUTO: 0.51 10*3/UL (ref 0.2–1.2)
MONOCYTES NFR BLD AUTO: 8.7 % (ref 3–10)
NEUTROPHILS # BLD AUTO: 3.47 10*3/UL (ref 2–8)
NEUTROPHILS NFR BLD AUTO: 59 % (ref 30–85)
NRBC CBCN: 0 % (ref 0–0.7)
OSMOLALITY SERPL CALC.SUM OF ELEC: 293 MOSM/KG (ref 273–304)
PLATELET # BLD AUTO: 215 10*3/UL (ref 130–400)
PMV BLD AUTO: 11.9 FL (ref 9.4–12.3)
POTASSIUM SERPL-SCNC: 3.8 MMOL/L (ref 3.5–5.3)
RBC # BLD AUTO: 4.35 10*6/UL (ref 4.2–5.4)
SODIUM SERPL-SCNC: 139 MMOL/L (ref 135–147)
TRIGL SERPL-MCNC: 272 MG/DL (ref 40–150)
TSH SERPL-ACNC: 2.91 M[IU]/L (ref 0.27–4.2)
VIT B12 SERPL-MCNC: 656 PG/ML (ref 211–911)
VLDLC SERPL-MCNC: 54 MG/DL (ref 5–37)
WBC # BLD AUTO: 5.88 10*3/UL (ref 4.8–10.8)

## 2020-12-31 ENCOUNTER — OFFICE VISIT CONVERTED (OUTPATIENT)
Dept: OTOLARYNGOLOGY | Facility: CLINIC | Age: 85
End: 2020-12-31
Attending: OTOLARYNGOLOGY

## 2021-01-12 ENCOUNTER — OFFICE VISIT CONVERTED (OUTPATIENT)
Dept: FAMILY MEDICINE CLINIC | Facility: CLINIC | Age: 86
End: 2021-01-12
Attending: NURSE PRACTITIONER

## 2021-01-12 ENCOUNTER — HOSPITAL ENCOUNTER (OUTPATIENT)
Dept: FAMILY MEDICINE CLINIC | Facility: CLINIC | Age: 86
Discharge: HOME | End: 2021-01-12
Attending: NURSE PRACTITIONER

## 2021-01-12 ENCOUNTER — CONVERSION ENCOUNTER (OUTPATIENT)
Dept: FAMILY MEDICINE CLINIC | Facility: CLINIC | Age: 86
End: 2021-01-12

## 2021-01-13 LAB — BACTERIA UR CULT: NORMAL

## 2021-01-22 ENCOUNTER — CONVERSION ENCOUNTER (OUTPATIENT)
Dept: SURGERY | Facility: CLINIC | Age: 86
End: 2021-01-22

## 2021-01-22 ENCOUNTER — OFFICE VISIT CONVERTED (OUTPATIENT)
Dept: UROLOGY | Facility: CLINIC | Age: 86
End: 2021-01-22
Attending: NURSE PRACTITIONER

## 2021-02-12 ENCOUNTER — HOSPITAL ENCOUNTER (OUTPATIENT)
Dept: VACCINE CLINIC | Facility: HOSPITAL | Age: 86
Discharge: HOME | End: 2021-02-12
Attending: INTERNAL MEDICINE

## 2021-02-22 ENCOUNTER — HOSPITAL ENCOUNTER (OUTPATIENT)
Dept: LAB | Facility: HOSPITAL | Age: 86
Discharge: HOME | End: 2021-02-22
Attending: NURSE PRACTITIONER

## 2021-02-22 LAB
ALBUMIN SERPL-MCNC: 4.3 G/DL (ref 3.5–5)
ALBUMIN/GLOB SERPL: 1.3 {RATIO} (ref 1.4–2.6)
ALP SERPL-CCNC: 66 U/L (ref 43–160)
ALT SERPL-CCNC: 13 U/L (ref 10–40)
ANION GAP SERPL CALC-SCNC: 15 MMOL/L (ref 8–19)
AST SERPL-CCNC: 20 U/L (ref 15–50)
BILIRUB SERPL-MCNC: 0.52 MG/DL (ref 0.2–1.3)
BUN SERPL-MCNC: 24 MG/DL (ref 5–25)
BUN/CREAT SERPL: 22 {RATIO} (ref 6–20)
CALCIUM SERPL-MCNC: 9.7 MG/DL (ref 8.7–10.4)
CHLORIDE SERPL-SCNC: 104 MMOL/L (ref 99–111)
CONV CO2: 28 MMOL/L (ref 22–32)
CONV TOTAL PROTEIN: 7.6 G/DL (ref 6.3–8.2)
CREAT UR-MCNC: 1.08 MG/DL (ref 0.5–0.9)
GFR SERPLBLD BASED ON 1.73 SQ M-ARVRAT: 46 ML/MIN/{1.73_M2}
GLOBULIN UR ELPH-MCNC: 3.3 G/DL (ref 2–3.5)
GLUCOSE SERPL-MCNC: 95 MG/DL (ref 65–99)
OSMOLALITY SERPL CALC.SUM OF ELEC: 300 MOSM/KG (ref 273–304)
POTASSIUM SERPL-SCNC: 3.8 MMOL/L (ref 3.5–5.3)
SODIUM SERPL-SCNC: 143 MMOL/L (ref 135–147)
T4 FREE SERPL-MCNC: 1.5 NG/DL (ref 0.9–1.8)
TSH SERPL-ACNC: 2.58 M[IU]/L (ref 0.27–4.2)

## 2021-03-09 ENCOUNTER — PROCEDURE VISIT CONVERTED (OUTPATIENT)
Dept: PODIATRY | Facility: CLINIC | Age: 86
End: 2021-03-09
Attending: PODIATRIST

## 2021-03-12 ENCOUNTER — CONVERSION ENCOUNTER (OUTPATIENT)
Dept: FAMILY MEDICINE CLINIC | Facility: CLINIC | Age: 86
End: 2021-03-12

## 2021-03-12 ENCOUNTER — OFFICE VISIT CONVERTED (OUTPATIENT)
Dept: FAMILY MEDICINE CLINIC | Facility: CLINIC | Age: 86
End: 2021-03-12
Attending: NURSE PRACTITIONER

## 2021-03-13 ENCOUNTER — HOSPITAL ENCOUNTER (OUTPATIENT)
Dept: FAMILY MEDICINE CLINIC | Facility: CLINIC | Age: 86
Discharge: HOME | End: 2021-03-13
Attending: NURSE PRACTITIONER

## 2021-03-15 ENCOUNTER — HOSPITAL ENCOUNTER (OUTPATIENT)
Dept: LAB | Facility: HOSPITAL | Age: 86
Discharge: HOME | End: 2021-03-15
Attending: NURSE PRACTITIONER

## 2021-03-15 LAB
25(OH)D3 SERPL-MCNC: 94.8 NG/ML (ref 30–100)
ALBUMIN SERPL-MCNC: 4.2 G/DL (ref 3.5–5)
ALBUMIN/GLOB SERPL: 1.2 {RATIO} (ref 1.4–2.6)
ALP SERPL-CCNC: 53 U/L (ref 43–160)
ALT SERPL-CCNC: 13 U/L (ref 10–40)
ANION GAP SERPL CALC-SCNC: 18 MMOL/L (ref 8–19)
AST SERPL-CCNC: 20 U/L (ref 15–50)
BASOPHILS # BLD AUTO: 0.04 10*3/UL (ref 0–0.2)
BASOPHILS NFR BLD AUTO: 0.7 % (ref 0–3)
BILIRUB SERPL-MCNC: 0.46 MG/DL (ref 0.2–1.3)
BUN SERPL-MCNC: 37 MG/DL (ref 5–25)
BUN/CREAT SERPL: 25 {RATIO} (ref 6–20)
CALCIUM SERPL-MCNC: 9.8 MG/DL (ref 8.7–10.4)
CHLORIDE SERPL-SCNC: 100 MMOL/L (ref 99–111)
CONV ABS IMM GRAN: 0.01 10*3/UL (ref 0–0.2)
CONV CO2: 26 MMOL/L (ref 22–32)
CONV IMMATURE GRAN: 0.2 % (ref 0–1.8)
CONV TOTAL PROTEIN: 7.7 G/DL (ref 6.3–8.2)
CREAT UR-MCNC: 1.46 MG/DL (ref 0.5–0.9)
DEPRECATED RDW RBC AUTO: 45.9 FL (ref 36.4–46.3)
EOSINOPHIL # BLD AUTO: 0.2 10*3/UL (ref 0–0.7)
EOSINOPHIL # BLD AUTO: 3.7 % (ref 0–7)
ERYTHROCYTE [DISTWIDTH] IN BLOOD BY AUTOMATED COUNT: 12.2 % (ref 11.7–14.4)
GFR SERPLBLD BASED ON 1.73 SQ M-ARVRAT: 32 ML/MIN/{1.73_M2}
GLOBULIN UR ELPH-MCNC: 3.5 G/DL (ref 2–3.5)
GLUCOSE SERPL-MCNC: 104 MG/DL (ref 65–99)
HCT VFR BLD AUTO: 41.3 % (ref 37–47)
HGB BLD-MCNC: 13.4 G/DL (ref 12–16)
LYMPHOCYTES # BLD AUTO: 1.52 10*3/UL (ref 1–5)
LYMPHOCYTES NFR BLD AUTO: 28.2 % (ref 20–45)
MCH RBC QN AUTO: 33.2 PG (ref 27–31)
MCHC RBC AUTO-ENTMCNC: 32.4 G/DL (ref 33–37)
MCV RBC AUTO: 102.2 FL (ref 81–99)
MONOCYTES # BLD AUTO: 0.65 10*3/UL (ref 0.2–1.2)
MONOCYTES NFR BLD AUTO: 12.1 % (ref 3–10)
NEUTROPHILS # BLD AUTO: 2.97 10*3/UL (ref 2–8)
NEUTROPHILS NFR BLD AUTO: 55.1 % (ref 30–85)
NRBC CBCN: 0 % (ref 0–0.7)
OSMOLALITY SERPL CALC.SUM OF ELEC: 299 MOSM/KG (ref 273–304)
PLATELET # BLD AUTO: 198 10*3/UL (ref 130–400)
PMV BLD AUTO: 11.8 FL (ref 9.4–12.3)
POTASSIUM SERPL-SCNC: 4 MMOL/L (ref 3.5–5.3)
RBC # BLD AUTO: 4.04 10*6/UL (ref 4.2–5.4)
SODIUM SERPL-SCNC: 140 MMOL/L (ref 135–147)
VIT B12 SERPL-MCNC: 798 PG/ML (ref 211–911)
WBC # BLD AUTO: 5.39 10*3/UL (ref 4.8–10.8)

## 2021-03-16 LAB — FOLATE SERPL-MCNC: >20 NG/ML (ref 4.8–20)

## 2021-03-19 ENCOUNTER — HOSPITAL ENCOUNTER (OUTPATIENT)
Dept: VACCINE CLINIC | Facility: HOSPITAL | Age: 86
Discharge: HOME | End: 2021-03-19
Attending: INTERNAL MEDICINE

## 2021-04-07 ENCOUNTER — CONVERSION ENCOUNTER (OUTPATIENT)
Dept: CARDIOLOGY | Facility: CLINIC | Age: 86
End: 2021-04-07

## 2021-04-07 ENCOUNTER — OFFICE VISIT CONVERTED (OUTPATIENT)
Dept: CARDIOLOGY | Facility: CLINIC | Age: 86
End: 2021-04-07
Attending: INTERNAL MEDICINE

## 2021-04-19 ENCOUNTER — HOSPITAL ENCOUNTER (OUTPATIENT)
Dept: LAB | Facility: HOSPITAL | Age: 86
Discharge: HOME | End: 2021-04-19
Attending: INTERNAL MEDICINE

## 2021-04-19 LAB
ANION GAP SERPL CALC-SCNC: 14 MMOL/L (ref 8–19)
BNP SERPL-MCNC: 701 PG/ML (ref 0–1800)
BUN SERPL-MCNC: 23 MG/DL (ref 5–25)
BUN/CREAT SERPL: 19 {RATIO} (ref 6–20)
CALCIUM SERPL-MCNC: 10.1 MG/DL (ref 8.7–10.4)
CHLORIDE SERPL-SCNC: 101 MMOL/L (ref 99–111)
CONV CO2: 28 MMOL/L (ref 22–32)
CREAT UR-MCNC: 1.19 MG/DL (ref 0.5–0.9)
GFR SERPLBLD BASED ON 1.73 SQ M-ARVRAT: 41 ML/MIN/{1.73_M2}
GLUCOSE SERPL-MCNC: 90 MG/DL (ref 65–99)
OSMOLALITY SERPL CALC.SUM OF ELEC: 291 MOSM/KG (ref 273–304)
POTASSIUM SERPL-SCNC: 4.1 MMOL/L (ref 3.5–5.3)
SODIUM SERPL-SCNC: 139 MMOL/L (ref 135–147)

## 2021-04-30 ENCOUNTER — HOSPITAL ENCOUNTER (OUTPATIENT)
Dept: GENERAL RADIOLOGY | Facility: HOSPITAL | Age: 86
Discharge: HOME | End: 2021-04-30
Attending: NURSE PRACTITIONER

## 2021-05-04 ENCOUNTER — HOSPITAL ENCOUNTER (OUTPATIENT)
Dept: FAMILY MEDICINE CLINIC | Facility: CLINIC | Age: 86
Discharge: HOME | End: 2021-05-04
Attending: NURSE PRACTITIONER

## 2021-05-09 LAB — CONV DRUG SCREEN URINE QUALITATIVE: POSITIVE

## 2021-05-10 NOTE — H&P
History and Physical      Patient Name: Fabiana Hebrert   Patient ID: [id number]   Sex: Female   YOB: 1934    Primary Care Provider: Kika VILLASENOR    Visit Date: August 4, 2020    Provider: Pedrito Rios MD   Location[facility]   Location Address: [address] [place] [address]  [id number]   Location Phone: [phone]          Chief Complaint  · Outpatient History & Physical / Surgical Orders  · Hemorrhoids      History Of Present Illness     Ms. Herbert came in today for evaluation. She is a nice lady who has pretty symptomatic hemorrhoids. She has been having trouble for a while. She now will frequently have a lot of issues having discomfort following her bowel movements.       Past Medical History  Anemia; Anxiety; Arthritis; Arthritis; Back pain; Bone Density Screening; Cramps, muscle, general; Difficulty in swallowing; Dizziness; Dizziness; Dysuria; EKG, abnormal; Fatigue; Headache; Hearing loss; Hyperlipidemia; Hypertension, essential; Hypothyroidism; Imbalance; Incontinence in female; Ingrown toenail; Insomnia; Iron (Fe) deficiency anemia; Lightheadedness; Low back pain; Lumbar degenerative disc disease; Lumbar/LS disc degeneration; Macular degeneration; Macular degeneration; Mitral Valve Disorders; Mitral valve replaced; Nocturia; Pacemaker; Pain, Joint; Pain, Leg; Pap smear for cervical cancer screening; Peptic ulcer disease; Ringing in ears; Sciatica; Screening Mammogram; Shortness Of Air; Urethral caruncle; Vision changes; Weakness         Past Surgical History  Appendectomy; Breast biopsy, left breast; Cataract exctraction with lens implant; Colon Surgery; Colonoscopy; Cystoscopy; EGD; Excision of urethral prolapse; Hemorrhoidectomy; Hysterectomy; Lumbar laminectomy; Mitral valve replacement; Pacemaker; Wrist         Medication List  Bystolic 10 mg oral tablet; Cortizone-10 1 % topical cream; Desitin 13 % topical cream; esomeprazole magnesium 20 mg oral capsule,delayed  "release(DR/EC); estradiol 0.01 % (0.1 mg/gram) vaginal cream; fluticasone propionate 50 mcg/actuation nasal spray,suspension; hydrochlorothiazide 12.5 mg oral capsule; hydroxyzine HCl 25 mg oral tablet; levothyroxine 50 mcg oral tablet; lidocaine 5 % topical adhesive patch,medicated; lorazepam 0.5 mg oral tablet; losartan 25 mg oral tablet; Miralax 17 gram/dose oral powder; Nexium 20 mg oral capsule,delayed release(DR/EC); nystatin 100,000 unit/gram topical powder; prevegen memory vitamin; Probiotic oral; Rectal Rockets 2.5 %-2% Suppository; Tylenol oral; Vision Formula (with lutein) 1,000 unit-200 mg-60 unit-2 mg oral tablet; Vitamin D3 1,000 unit oral capsule; zinc oxide topical         Allergy List  Ambien; aspirin; Bactrim DS; Boniva; Cipro; ciprofloxacin; Cymbalta; levofloxacin; Lyrica; metronidazole; nitrofurantoin; Pamelor; ranitidine HCl; tramadol       Allergies Reconciled  Family Medical History  Heart Disease; Diabetes, unspecified type; - No Family History of Colorectal Cancer         Reproductive History   2 Para 2 0 0 2       Social History  Active but no formal exercise; Alcohol (Never); ; No known infection risk; Not sexually active; Tobacco (Never)         Review of Systems  · Constitutional  o Denies  o : fever  · Eyes  o Denies  o : yellowish discoloration of eyes  · HENT  o Denies  o : difficulty swallowing  · Cardiovascular  o Denies  o : chest pain on exertion  · Respiratory  o Denies  o : shortness of breath, cough  · Gastrointestinal  o Admits  o : diarrhea, constipation  o Denies  o : nausea, vomiting  · Integument  o Denies  o : rash  · Neurologic  o Denies  o : tingling or numbness  · Musculoskeletal  o Denies  o : joint pain  · Endocrine  o Denies  o : weight gain, weight loss      Vitals  Date Time BP Position Site L\R Cuff Size HR RR TEMP (F) WT  HT  BMI kg/m2 BSA m2 O2 Sat        2020 02:10 PM       14  110lbs 0oz 5'  2\" 20.12 1.48           Physical " Examination  · Constitutional  o Appearance  o : well-nourished, well developed, alert, in no acute distress  · Head and Face  o Head  o :   § Inspection  § : atraumatic, normocephalic  · Neck  o Inspection/Palpation  o : supple, normal range of motion  · Respiratory  o Inspection of Chest  o : normal inspection  o Auscultation of Lungs  o : breath sounds normal, no distress, clear to ascultate bilaterally  · Cardiovascular  o Heart  o :   § Auscultation of Heart  § : regular rate and rhythm, no murmur, gallop or rub  · Gastrointestinal  o Abdominal Examination  o : normal bowel sounds, non-tender, soft  · Genitourinary  o Digital Rectal Examination  o : Hemorrhoids noted          Assessment  · Pre-Surgical Orders     V72.84  · Hemorrhoids     455.6/K64.9       Very nice lady who has pretty symptomatic hemorrhoids.       Plan  · Orders  o Surgery Order (GENOR) - 455.6/K64.9 - 08/20/2020  o [facility] Pre-Op Covid-19 Screening (11994) - 455.6/K64.9 - 08/15/2020  · Medications  o Medications have been Reconciled  o Transition of Care or Provider Policy  · Instructions  o PLAN:  o Handouts Provided-Pre-Procedure Instructions including date and time and location of procedure.  o Surgical Facility: [facility]  o ****Surgical Orders****  o ****Patient Status****  o Outpatient  o RISK AND BENEFITS:  o Consent for surgery: Given these options, the patient has verbally expressed an understanding of the risks of surgery and finds these risks acceptable. We will proceed with surgery as soon as possible.  o Consult Anesthesia for any post-operative block, or any pain management procedure deemed necessary by the anestesiologist for adequate post-operative pain control.   o O.R. PREP: Per protocol  o PLEASE SIGN PERMIT FOR: Hemorrhoid banding  o *___The above History and Physical Examination has been completed within 30 days of admission.  o Electronically Identified Patient Education Materials Provided Electronically      We are going to set her up for a hemorrhoid banding. She has been having some palpitation issues and she has a prior history of heart disease. She is going to see Dr. Anthony tomorrow for a pacemaker check and she told me she would talk to him about these complaints. Once he is okay with her proceeding, we will set her up to have a rectal exam under anesthesia and hemorrhoid banding.             Electronically Signed by: Pedrito Rios MD -Author on August 9, 2020 08:55:53 AM

## 2021-05-10 NOTE — H&P
History and Physical      Patient Name: Fabiana Herbert   Patient ID: [id number]   Sex: Female   YOB: 1934    Primary Care Provider: Kika VILLASENOR    Visit Date: September 15, 2020    Provider: Pedrito Rios MD   Location: [facility]   Location Address: [address] [place] [address]  [id number]   Location Phone: [phone]          Chief Complaint  · Outpatient History & Physical / Surgical Orders  · Hemorrhoids      History Of Present Illness     Fabiana came back for follow-up. She is a very nice lady who recently had a hemorrhoid banding procedure. Initially, she had done well but over the last week or so, she has had a lot of discomfort. She is not really describing any bleeding but has pretty severe pain when she tries to have a bowel movement.       Past Medical History  Anemia; Anxiety; Arthritis; Arthritis; Back pain; Bone Density Screening; Cramps, muscle, general; Difficulty in swallowing; Dizziness; Dizziness; Dysuria; EKG, abnormal; Fatigue; Headache; Hearing loss; Hyperlipidemia; Hypertension, essential; Hypothyroidism; Imbalance; Incontinence in female; Ingrown toenail; Insomnia; Iron (Fe) deficiency anemia; Lightheadedness; Low back pain; Lumbar degenerative disc disease; Lumbar/LS disc degeneration; Macular degeneration; Macular degeneration; Mitral Valve Disorders; Mitral valve replaced; Nocturia; Pacemaker; Pain, Joint; Pain, Leg; Pap smear for cervical cancer screening; Peptic ulcer disease; Ringing in ears; Sciatica; Screening Mammogram; Shortness Of Air; Urethral caruncle; Vision changes; Weakness         Past Surgical History  Appendectomy; Breast biopsy, left breast; Cataract exctraction with lens implant; Colon Surgery; Colonoscopy; Cystoscopy; EGD; Excision of urethral prolapse; Hemorrhoidectomy; Hysterectomy; Lumbar laminectomy; Mitral valve replacement; Pacemaker; Wrist         Medication List  Bystolic 10 mg oral tablet; Cortizone-10 1 % topical  "cream; Desitin 13 % topical cream; estradiol 0.01 % (0.1 mg/gram) vaginal cream; famotidine 20 mg oral tablet; fluticasone propionate 50 mcg/actuation nasal spray,suspension; hydrochlorothiazide 12.5 mg oral capsule; hydrocortisone 2.5 % topical cream; hydroxyzine HCl 25 mg oral tablet; levothyroxine 50 mcg oral tablet; lidocaine 5 % topical adhesive patch,medicated; lorazepam 0.5 mg oral tablet; losartan 25 mg oral tablet; Miralax 17 gram/dose oral powder; nystatin 100,000 unit/gram topical powder; Pepcid 20 mg oral tablet; prevegen memory vitamin; Probiotic oral; Rectal Rockets 2.5 %-2% Suppository; Tylenol oral; Vision Formula (with lutein) 1,000 unit-200 mg-60 unit-2 mg oral tablet; Vitamin D3 1,000 unit oral capsule; zinc oxide topical         Allergy List  Ambien; aspirin; Bactrim DS; Boniva; Cipro; ciprofloxacin; Cymbalta; levofloxacin; Lyrica; metronidazole; nitrofurantoin; Pamelor; ranitidine HCl; tramadol       Allergies Reconciled  Family Medical History  Heart Disease; Diabetes, unspecified type; - No Family History of Colorectal Cancer         Reproductive History   2 Para 2 0 0 2       Social History  Active but no formal exercise; Alcohol (Never); ; No known infection risk; Not sexually active; Tobacco (Never)         Vitals  Date Time BP Position Site L\R Cuff Size HR RR TEMP (F) WT  HT  BMI kg/m2 BSA m2 O2 Sat        09/15/2020 02:53 PM       16  108lbs 0oz 5'  2\" 19.75 1.46           Physical Examination  · Constitutional  o Appearance  o : well-nourished, well developed, alert, in no acute distress  · Head and Face  o Head  o :   § Inspection  § : atraumatic, normocephalic  · Neck  o Inspection/Palpation  o : supple, normal range of motion  · Respiratory  o Inspection of Chest  o : normal inspection  o Auscultation of Lungs  o : breath sounds normal, no distress, clear to ascultate bilaterally  · Cardiovascular  o Heart  o :   § Auscultation of Heart  § : regular rate and rhythm, " no murmur, gallop or rub  · Gastrointestinal  o Abdominal Examination  o : normal bowel sounds, non-tender, soft          Assessment  · Pre-Surgical Orders     V72.84  · Grade III hemorrhoids     455.0/K64.2       Recurrent rectal pain in a lady who had a hemorrhoid banding a little bit over a month ago. Initially, she had some improvement in her symptoms but she is certainly having a lot of discomfort now. It has been 5-6 years since her last colonoscopy.       Plan  · Orders  o [facility] Pre-Op Covid-19 Screening (23055) - 455.0/K64.2 - 09/25/2020  o General Surgery Order (GENOR) - 455.0/K64.2 - 10/01/2020  · Medications  o Medications have been Reconciled  o Transition of Care or Provider Policy  · Instructions  o PLAN:  o Handouts Provided-Pre-Procedure Instructions including date and time and location of procedure.  o Surgical Facility: [facility]  o ****Surgical Orders****  o ****Patient Status****  o Outpatient  o RISK AND BENEFITS:  o Consent for surgery: Given these options, the patient has verbally expressed an understanding of the risks of surgery and finds these risks acceptable. We will proceed with surgery as soon as possible.  o Consult Anesthesia for any post-operative block, or any pain management procedure deemed necessary by the anestesiologist for adequate post-operative pain control.   o O.R. PREP: Per protocol  o SCD's preoperatively  o PLEASE SIGN PERMIT FOR: Flexible sigmoidoscopy and possible hemorrhoidectomy  o *___The above History and Physical Examination has been completed within 30 days of admission.  o Electronically Identified Patient Education Materials Provided Electronically     We will set her up for a flexible sigmoidoscopy in the OR and if necessary, we will be prepared to potentially do a hemorrhoidectomy if that seems to be warranted. I have described the procedure to her as well as the risks and benefits and she is agreeable to proceeding.             Electronically  Signed by: Aliza Dawson-, -Author on September 16, 2020 03:04:26 PM  Electronically Co-signed by: Pedrito Rios MD -Reviewer on September 21, 2020 01:02:22 PM

## 2021-05-11 NOTE — PROCEDURES
"   Procedure Note      Patient Name: Fabiana Herbert   Patient ID: [id number]   Sex: Female   YOB: 1934    Primary Care Provider: Kika VILLASENOR   Referring Provider: Kika VILLASENOR    Visit Date: April 7, 2021    Provider: Daniel Anthony MD   Location: [facility]   Location Address: [address] [place] [address], [address]   [place]  [id number]   Location Phone: [phone]          FINAL REPORT   TRANSTHORACIC ECHOCARDIOGRAM REPORT    Diagnosis: Chest pain, precordial   Height: 5'2\" Weight: 108 B/P: 146/80 BSA: 1.5   Tech: JTW   MEASUREMENTS:  RVID (Diastole) : RVID. (NORMAL: 0.7 to 2.4 cm max)   LVID (Systole): 2.1 cm (Diastole): 2.9 cm . (NORMAL: 3.7 - 5.4 cm)   Posterior Wall Thickness (Diastole): 1.1 cm. (NORMAL: 0.8 - 1.1 cm)   Septal Thickness (Diastole): 1.1 cm. (NORMAL: 0.7 - 1.2 cm)   LAID (Systole): 3.3 cm. (NORMAL: 1.9 - 3.8 cm)   Aortic Root Diameter (Diastole): 3.1 cm. (NORMAL: 2.0 - 3.7 cm)   COMMENTS:  The patient underwent 2-D, M-Mode, and Doppler examination, including pulse-wave, continuous-wave, and color-flow analysis; the study is technically adequate.   FINDINGS:  MITRAL VALVE: Prosthetic mitral valve noted with adequate opening of the mitral valve leaflets.   AORTIC VALVE: Mild fibrocalcific changes of a trileaflet aortic valve with adequate opening of the aortic valve leaflets.   TRICUSPID VALVE: Normal.   PULMONIC VALVE: Not well seen.   LEFT ATRIUM: Normal; no masses seen. LA volume is 21 mL/m2.   AORTIC ROOT: Normal in size and motion.   LEFT VENTRICLE: The left ventricular chamber size is normal. The left ventricular wall thickness is normal. The left ventricular systolic function is normal with an estimated EF of 55%. There is abnormal motion of the interventricular septum most likely secondary to right ventricular pacing.   RIGHT ATRIUM: Normal. Pacing leads noted.   RIGHT VENTRICLE: Normal size and function.   PERICARDIUM: No effusion.   INFERIOR VENA CAVA: " Diameter is 1.6 cm with greater than 50% reduction with inspiration.   DOPPLER: Pulse-wave, continuous wave, and color-flow Doppler evaluation was performed. E/A ratio is 0.7. DT= 254 msec. IVRT is 102 msec. E/E' is 14. The peak gradient across the mitral valve is 5 mmHg with 3 mmHg, pressure half-time of 89 msec and aortic valve area of 2.5 cm2. There is tricuspid regurgitation with estimated pulmonary artery systolic pressure of 30-35 mmHg.   Faxed: 04/12/2021      CONCLUSION:  1.  Left ventricular chamber size is normal. The left ventricular wall thickness is normal. The left ventricular        systolic function is normal with an estimated EF of 55%. There is abnormal motion of the interventricular        septum most likely secondary to right ventricular pacing.   2.  Grade  1 left ventricular diastolic dysfunction.  3.  Normally functioning prosthetic mitral valve.  4.  Tricuspid regurgitation with estimated pulmonary artery systolic pressure of 30-35 mmHg.      Daniel Anthony MD, [facility]  PM/pap                   Electronically Signed by: Xiomy Pizarro-, Other -Author on April 12, 2021 11:46:24 AM  Electronically Co-signed by: Daniel Anthony MD -Reviewer on April 20, 2021 01:42:13 PM

## 2021-05-12 NOTE — PROGRESS NOTES
Quick Note      Patient Name: Fabiana Herbert   Patient ID: [id number]   Sex: Female   YOB: 1934    Primary Care Provider: Graham Valdez MD   Referring Provider: Graham Valdez MD    Visit Date: April 17, 2020    Provider: Xuan Ibanez MD   Location[facility]   Location Address: [address] [place] [address]  [id number]   Location Phone: [phone]          History Of Present Illness  The patient presents for follow-up for a urethral caruncle, urinary incontinence and dysuria; the burning has improved slightly, she is still with symptoms but improved. She is also with leaking, not improving and wants something to help with that.   Symptoms are dysuria. The quality of the symptoms is burning with a gradual onset. She is mildly bothered by the symptoms. There are no additional symptoms. There are no aggravating factors. The patient is not constipated.   She has had prior treatment. No relief has been obtained with antibiotics and or estrace cream; but pyridium has worked when taken daily. The most recent antibiotic was taken 1 month ago.   TELEHEALTH TELEPHONE VISIT  Chief Complaint: urethral caruncle, UTI (chronic), urinary incontinence   Fabiana Herbert is a 85 year old /White female who is presenting for evaluation via telehealth telephone visit. Verbal consent obtained before beginning visit.   Provider spent 12 minutes with the patient during telehealth visit.   The following staff were present during this visit: Katie Davis and Xuan Ibanez MD   Past Medical History/Overview of Patient Symptoms     She has been using Estrace cream daily since I saw her last.      She continues to have burning and mainly the burning is just to the left of her urethra on the inner labia.  It is much better than prior to surgery but still present every few days.     Patient had a excision of her urethral polyp done in the operating room with the last few months.     She has terrible  issues with constipation and she thinks her hemmorhoids are worse.  She has a cream that she is using but it is not working as well.  She is still having constipation.  She states she has a bowel movement in the morning and it is normal but she feels she has to go later and its then when she has to strain to go.  She is using miralax and also some Fleets suppositories which are helping.           Assessment  · Urethral caruncle     599.3/N36.2  · Dysuria     788.1/R30.0  · Urethral caruncle     599.3/N36.2  · Urinary incontinence     788.30/R32  · Urethral caruncle     599.3/N36.2  · Dysuria     788.1/R30.0  · Nocturia     788.43/R35.1  · Urinary frequency     788.41/R35.0  · Vaginal itching     698.1/N89.8  · Malodorous urine     791.9/R82.90  · Candidiasis     112.9/B37.9  · Cystitis     595.9/N30.90      Plan  · Orders  o Urine Culture (Cath) [facility] ([id number], 76504, 68267, 98918, 71189, 83572) - 595.9/N30.90 - 04/17/2020  o Physician Telephone Evaluation, 11-20 minutes ([id number]) - 599.3/N36.2, 595.9/N30.90 - 04/17/2020  · Medications  o Medications have been Reconciled  o Transition of Care or Provider Policy  · Instructions  o She will continue her Estrace Cream. Continue Miralax. I will see her in 3 months.   o She may have a UTI. I sent her urine culture order to [facility].   o Plan Of Care:   o Chronic conditions reviewed and taken into consideration for today's treatment plan.  o Patient instructed to seek medical attention urgently for new or worsening symptoms.  o Patient was educated/instructed on their diagnosis, treatment and medications prior to discharge from the clinic today.  o Discussed Covid-19 precautions including, but not limited to, social distancing, avoid touching your face, and hand washing.             Electronically Signed by: Xuan Ibanez MD -Author on April 17, 2020 12:48:52 PM

## 2021-05-13 NOTE — PROGRESS NOTES
Quick Note      Patient Name: Fabiana Herbert   Patient ID: [id number]   Sex: Female   YOB: 1934    Primary Care Provider: Kika VILLASENOR    Visit Date: May 28, 2020    Provider: Stanislav Anderson MD   Location: [facility]   Location Address: [address] [place] [address]  [id number]   Location Phone: [phone]          History Of Present Illness  TELEHEALTH TELEPHONE VISIT  Chief Complaint: Dysphasia   Fabiana Herbert is a 85 year old /White female who is presenting for evaluation via telehealth telephone visit. Verbal consent obtained before beginning visit.   Provider spent Telephone time of 11 minutes with the patient during telehealth visit.   The following staff were present during this visit: Alexi Gurrola   Past Medical History/Overview of Patient Symptoms     85-year-old female who was taught today via telehealth because of an abnormal esophagram and complaints of dysphasia.  She reports that she can eat chicken and fish but avoids red meats.  She also avoids carbonated beverages.  The symptoms have been going on for more than a year and appears stable.  She denies significant weight loss, nausea, vomiting, reflux symptoms.  She did have open heart surgery in 2016 the mitral valve replacement and subsequent pacemaker placement.  She denies any anticoagulation.           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 I therefore think she needs speech and language evaluation for possible modified barium swallow and will defer EGD given her age and comorbidities. The patient is in agreement with this plan and will follow up with me in 2 to 3 months. She would also like to defer any procedures  if possible which I think appropriate            Electronically Signed by: Stanislav Anderson MD -Author on May 28, 2020 03:17:25 PM

## 2021-05-13 NOTE — PROGRESS NOTES
Progress Note      Patient Name: Fabiana Herbert   Patient ID: [id number]   Sex: Female   YOB: 1934    Primary Care Provider: Kika VILLASENOR    Visit Date: June 17, 2020    Provider: Xuan Ibanez MD   Location: [facility]   Location Address: [address] [place] [address]  [id number]   Location Phone: [phone]          Chief Complaint  · f/u UTI's      History Of Present Illness  The patient presents for follow-up for a urethral caruncle, urinary incontinence and dysuria; the burning has improved slightly, she is still with symptoms but improved. She is also with leaking, not improving and wants something to help with that.   Symptoms are dysuria. The dysuria has been severe and was first noted approximately for awhile. She has had a lot of burning, vaginal itching, rectal itching and odor with urine in the past but this is been better. She has been seen by her PCP and treatment has not gotten any better. The quality of the symptoms is burning with a gradual onset. She is severely bothered by the symptoms. There are no additional symptoms. There are no aggravating factors. The patient is not constipated.   She has had prior treatment. No relief has been obtained with antibiotics and or estrace cream; but pyridium has worked when taken daily. The most recent antibiotic was taken months ago.        She has been using Estrace cream daily since I saw her last.      She continues to have burning and mainly the burning is just to the left of her urethra on the inner labia.  She states it burns constantly worse after she urinates but is constantly present.    Patient had a excision of her urethral polyp done in the operating room a few months ago.      She has done well but now has constipation and is having major pain with her hemmorhoids.  She had surgery for them in the past with Dr. Alonso.       Past Medical History  Anemia; Anxiety; Arthritis; Arthritis; Back pain; Bone Density  Screening; Cramps, muscle, general; Difficulty in swallowing; Dizziness; Dizziness; Dysuria; EKG, abnormal; Fatigue; Headache; Hearing loss; Hyperlipidemia; Hypertension, essential; Hypothyroidism; Imbalance; Incontinence in female; Ingrown toenail; Insomnia; Iron (Fe) deficiency anemia; Lightheadedness; Low back pain; Lumbar degenerative disc disease; Lumbar/LS disc degeneration; Macular degeneration; Macular degeneration; Mitral Valve Disorders; Mitral valve replaced; Nocturia; Pacemaker; Pain, Joint; Pain, Leg; Pap smear for cervical cancer screening; Peptic ulcer disease; Ringing in ears; Sciatica; Screening Mammogram; Shortness Of Air; Urethral caruncle; Vision changes; Weakness         Past Surgical History  Appendectomy; Breast biopsy, left breast; Cataract exctraction with lens implant; Colon Surgery; Colonoscopy; Cystoscopy; EGD; Excision of urethral prolapse; Hemorrhoidectomy; Hysterectomy; Lumbar laminectomy; Mitral valve replacement; Pacemaker; Wrist         Medication List  Bystolic 10 mg oral tablet; Cortizone-10 1 % topical cream; Desitin 13 % topical cream; Diflucan 150 mg oral tablet; estradiol 0.01 % (0.1 mg/gram) vaginal cream; fluticasone propionate 50 mcg/actuation nasal spray,suspension; hydrochlorothiazide 12.5 mg oral capsule; hydroxyzine HCl 25 mg oral tablet; levothyroxine 50 mcg oral tablet; lidocaine 5 % topical adhesive patch,medicated; lorazepam 0.5 mg oral tablet; losartan 25 mg oral tablet; Miralax 17 gram/dose oral powder; nystatin 100,000 unit/gram topical powder; pantoprazole 40 mg oral tablet,delayed release (DR/EC); prevegen memory vitamin; Probiotic oral; Tylenol oral; Vision Formula (with lutein) 1,000 unit-200 mg-60 unit-2 mg oral tablet; Vitamin D3 1,000 unit oral capsule; zinc oxide topical         Allergy List  Ambien; aspirin; Bactrim DS; Boniva; Cipro; ciprofloxacin; Cymbalta; levofloxacin; Lyrica; metronidazole; nitrofurantoin; Pamelor; ranitidine HCl; tramadol  "        Family Medical History  Heart Disease; Diabetes, unspecified type; - No Family History of Colorectal Cancer         Reproductive History   2 Para 2 0 0 2       Social History  Active but no formal exercise; Alcohol (Never); ; No known infection risk; Not sexually active; Tobacco (Never)         Immunizations  Name Date Admin   Influenza    Influenza    Influenza    Influenza    Influenza    Pneumococcal    Prevnar 13    Shingles          Review of Systems  · Constitutional  o Denies  o : chills, fever  · Gastrointestinal  o Denies  o : nausea, vomiting      Vitals  Date Time BP Position Site L\R Cuff Size HR RR TEMP (F) WT  HT  BMI kg/m2 BSA m2 O2 Sat        2020 02:52 /88 Sitting       109lbs 0oz 5'  2\" 19.94 1.47           Physical Examination  · Constitutional  o Appearance  o : Well nourished, well developed patient in no acute distress. Ambulating without difficulty.  · Head and Face  o Head  o :   § Inspection  § : atraumatic, normocephalic  o Face  o :   § Inspection  § : no facial lesions  · Eyes  o Conjunctivae  o : conjunctivae normal  o Sclerae  o : sclerae white  · Neck  o Inspection/Palpation  o : normal appearance, no masses or tenderness, trachea midline  · Respiratory  o Respiratory Effort  o : Breathing is unlabored without accessory muscle use  · Genitourinary  o Urethra  o : Urethral carbuncle well healed with no inflammation or redness. Her incision is well healed. It is not ulcerated and the erythema is gone.   · Skin and Subcutaneous Tissue  o General Inspection  o : No rashes, lesions or areas of discoloration present. Skin turgor is normal.  · Neurologic  o Mental Status Examination  o :   § Orientation  § : grossly oriented to person, place and time  § Attention  § : attention normal, concentration abilities normal  § Fund of Knowledge  § : fund of knowledge within normal limits, patient aware of current events  o Gait and Station  o : normal gait, able to " stand without difficulty  · Psychiatric  o Judgement and Insight  o : judgment and insight intact, judgement for everyday activities and social situations within normal limits, insight intact  o Mood and Affect  o : mood normal, affect appropriate          Results  · In-Office Procedures  o Lab procedure  § Automated dipstick urinalysis with microscopy (31941)   § Color Ur: Yellow   § Clarity Ur: Clear   § Glucose Ur Ql Strip: Negative   § Bilirub Ur Ql Strip: Negative   § Ketones Ur Ql Strip: Negative   § Sp Gr Ur Qn: 1.025   § Hgb Ur Ql Strip: Negative   § pH Ur-LsCnc: 5.5   § Prot Ur Ql Strip: Negative   § Urobilinogen Ur Strip-mCnc: 0.2   § Nitrite Ur Ql Strip: Negative   § WBC Est Ur Ql Strip: Negative   § RBC UrnS Qn HPF: 0   § WBC UrnS Qn HPF: 0   § Bacteria UrnS Qn HPF: 0   § Crystals UrnS Qn HPF: 0   § Epithelial Cells (non renal): 0 /HPF  § Epithelial Cells (renal): 0       Assessment  · Urethral caruncle     599.3/N36.2  · Dysuria     788.1/R30.0  · Urethral caruncle     599.3/N36.2  · Urinary incontinence     788.30/R32  · Urethral caruncle     599.3/N36.2  · Dysuria     788.1/R30.0  · Nocturia     788.43/R35.1  · Urinary frequency     788.41/R35.0  · Vaginal itching     698.1/N89.8  · Malodorous urine     791.9/R82.90  · Candidiasis     112.9/B37.9  · Cystitis     595.9/N30.90  · Hemorrhoids, complicated     455.8/K64.8      Plan  · Medications  o Medications have been Reconciled  o Transition of Care or Provider Policy  · Instructions  o Her incision is well healed. I sent in rectal rocket suppository for her and if that is not effective I will refer her to general surgery.   o Electronically Identified Patient Education Materials Provided Electronically            Electronically Signed by: Xuan Ibanez MD -Author on June 17, 2020 03:47:11 PM

## 2021-05-13 NOTE — PROGRESS NOTES
Progress Note      Patient Name: Fabiana Herbert   Patient ID: [id number]   Sex: Female   YOB: 1934    Primary Care Provider: Kika VILLASENOR    Visit Date: July 10, 2020    Provider: JACKLYN Boateng   Location: [facility]   Location Address: [address] [place] [address], [address]  [place], [place]  [id number]   Location Phone: [phone]          Chief Complaint  · 4 MONTHS F/U      History Of Present Illness  Fabiana Herbert is a 85 year old /White female who presents for evaluation and treatment of:      Patient is here today for 4 months follow up.     Says she having problems with her hemorrhoids it is swollen burning and irritated. She uses rectal rockets suppositories which do help a little. She is taking mirilax for constipation. She is also using fleet supp most nights which is effective. Other than that says she is in pretty good shape. She has a follow up w/ on 7/15/20.  did performed a hemorrhoidectomy in the past.    htn-bp 125/66 at home this morning. States anxiety increases her b/p and back pain.    reflux-she is taking pantoprazole 40mg bid. States it does not seem to be helping. Admits nausea at times. States she can take a bite of a werthers caramel and it settles her stomach.    states she still has a burning sensation in the vagina from time to time even after the surgery. It is no longer a daily problem but it comes and goes. She uses estradiol cream prn it seems to decrease the burning.    back pain-she is seeing  (chiropractor) and he is treating the back pain.    appt w/ on 7/30/20 or thyroid nodule. She had COVID testing. She had a thyroid U/S. States they are going to discuss thyroid surgery.           Past Medical History  Disease Name Date Onset Notes   Anemia --  --    Anxiety --  --    Arthritis --  --    Arthritis --  spine   Back pain --  --    Bone Density Screening 07/2015 osteoporosis   Cramps, muscle, general --  --     Difficulty in swallowing --  --    Dizziness --  --    Dizziness --  --    Dysuria 05/24/2019 --    EKG, abnormal 08/12/2016 --    Fatigue --  --    Headache --  --    Hearing loss --  --    Hyperlipidemia --  --    Hypertension, essential --  --    Hypothyroidism --  --    Imbalance --  --    Incontinence in female --  --    Ingrown toenail --  --    Insomnia --  --    Iron (Fe) deficiency anemia 02/01/2017 --    Lightheadedness 03/14/2018 The patient reports onset of lightheadedness following heart surgery in 2016. She notes that this tends to be brought on by a change in position. I would consider orthostasis as a potential culprit. I will attempt to pursue orthostatic blood pressures but the patient notes that it is hard for her to lay flat on the exam table. I will request her records be sent for my review.   Low back pain 06/09/2014 --    Lumbar degenerative disc disease 02/01/2017 --    Lumbar/LS disc degeneration 05/04/2014 --    Macular degeneration --  --    Macular degeneration --  Dharmesh   Mitral Valve Disorders --  --    Mitral valve replaced 12/07/2017 --    Nocturia 05/24/2019 --    Pacemaker 02/23/2018 --    Pain, Joint --  --    Pain, Leg --  --    Pap smear for cervical cancer screening 2015 --    Peptic ulcer disease 02/01/2017 --    Ringing in ears --  --    Sciatica 01/09/2015 --    Screening Mammogram --  no longer   Shortness Of Air --  --    Urethral caruncle 05/24/2019 --    Vision changes --  --    Weakness --  --          Past Surgical History  Procedure Name Date Notes   Appendectomy 1951 --    Breast biopsy, left breast 1955 --    Cataract exctraction with lens implant 1992, 2001 left, right   Colon Surgery 1979 18 inches removed diverticulitis   Colonoscopy 2018 Dr. Alonso, Moderate Diverticulosis-2018   Cystoscopy --  2/18/20 Cystoscopy, Excision of Urethral Prolapse w/    EGD 2018 --    Excision of urethral prolapse --  2/18/20 Cystoscopy, Excision of Urethral  Prolapse w/    Hemorrhoidectomy 2009/2014 hemorrhoid banding   Hysterectomy 1960's total   Lumbar laminectomy 7/2007 --    Mitral valve replacement 11/17/16  in [facility] November 2016 MRI compatible   Wrist --  left         Medication List  Name Date Started Instructions   Bystolic 10 mg oral tablet 03/30/2020 TAKE 1 TABLET BY MOUTH EVERY NIGHT AT BEDTIME   Cortizone-10 1 % topical cream  apply a thin layer to the affected area(s) by topical route as needed   Desitin 13 % topical cream  apply to affected area(s) by topical route   esomeprazole magnesium 20 mg oral capsule,delayed release(DR/EC) 07/10/2020 TAKE 1 CAPSULE BY MOUTH EVERY DAY AT LEAST 1 HOUR BEFORE A MEAL SWALLOWING WHOLE, DO NOT CRUSH OR CHEW   estradiol 0.01 % (0.1 mg/gram) vaginal cream 06/09/2020 INSERT 1 GRAM VAGINALLY EVERY DAY   fluticasone propionate 50 mcg/actuation nasal spray,suspension 11/03/2019 SPRAY 1 - 2 SPRAYS IN EACH NOSTRIL BY INTRANASAL ROUTE ONCE DAILY   hydrochlorothiazide 12.5 mg oral capsule 05/18/2020 TAKE ONE CAPSULE BY MOUTH EVERY DAY   hydroxyzine HCl 25 mg oral tablet 07/15/2019 one po BID PRN   levothyroxine 50 mcg oral tablet 04/30/2020 TAKE 1 TABLET(50 MCG) BY MOUTH EVERY DAY   lidocaine 5 % topical adhesive patch,medicated 03/25/2020 APPLY 2 PATCHES EXTERNALLY TO THE SKIN EVERY DAY. MAY WEAR UP TO 12 HOURS   lorazepam 0.5 mg oral tablet 07/13/2020 take 1 tablet (0.5 mg) by oral route once daily as needed for 30 days   losartan 25 mg oral tablet 02/24/2020 take 1 tablet by oral route QD   Miralax 17 gram/dose oral powder  take 17 gram mixed with 8 oz. water, juice, soda, coffee or tea by oral route once daily   nystatin 100,000 unit/gram topical powder 09/18/2019 apply to the affected area(s) by topical route 2 times per day   prevegen memory vitamin  1 cap daily   Probiotic oral  take 2 tablets one AM and one PM   Rectal Rockets 2.5 %-2% Suppository  1 per rectum QHS prn   Tylenol oral  --     Vision Formula (with lutein) 1,000 unit-200 mg-60 unit-2 mg oral tablet  take 1 tablet by oral route QD (OTC)   Vitamin D3 1,000 unit oral capsule  take 1 capsule by oral route daily   zinc oxide topical  using on vaginal area as needed daily         Allergy List  Allergen Name Date Reaction Notes   Ambien --  insomnia --    aspirin --  --  --    Bactrim DS --  --  upset stomach   Boniva --  --  --    Cipro --  --  --    ciprofloxacin --  arrhythmia/lightheadedness/fatigue --    Cymbalta --  upset stomach --    levofloxacin --  syncope/leg pain --    Lyrica --  --  --    metronidazole --  --  --    nitrofurantoin --  --  upset stomach   Pamelor --  back pain/hangover (nortriptyline)   ranitidine HCl --  --  --    tramadol --  hangover --        Allergies Reconciled  Family Medical History  Disease Name Relative/Age Notes   Heart Disease Aunt/  Father/  Son/  Uncle/   --    Diabetes, unspecified type Father/  Son/   --    - No Family History of Colorectal Cancer  --          Reproductive History  Menstrual   Age Menarche: 14   Pregnancy Summary   Total Pregnancies: 2 Full Term: 2 Premature: 0   Ab Induced: 0 Ab Spontaneous: 0 Ectopics: 0   Multiples: 0 Livin         Social History  Finding Status Start/Stop Quantity Notes   Active but no formal exercise --  --/-- --  --    Alcohol Never --/-- --  07/10/2020 - 2020 - 2019 - 2019 - 10/30/2019 - 2019 - 2019 - 2019 - 2018 -     --  --/-- --  --    No known infection risk --  --/-- --  --    Not sexually active --  --/-- --  --    Tobacco Never --/-- --  --          Immunizations  NameDate Admin Mfg Trade Name Lot Number Route Inj VIS Given VIS Publication   Itvqpcvak16/2019 NE Not Entered  NE NE 10/01/2019    Comments: Myra administered vaccine   Hqfxtpcka62/ [facility] Fluzone Quadrivalent  NE NE 2019    Comments:    Mzoaxofri69/ [facility] FLUZONE-HIGH DOSE [id number] IM RD 10/25/2017 2015  "  Comments: Pt tolerated well.   Kinsqbpxv89/10/2016 [facility] Fluarix, quadrivalent, preservative free 359MH IM RD 10/10/2016 08/07/2015   Comments: tolerated well   Cotqvokwl71/20/2015 [facility] Fluarix, quadrivalent, preservative free [id number] IM LD 10/20/2015 08/07/2015   Comments:    Oiabsgpxqvip80/01/2012 MSD PNEUMOVAX 23  IM NE 03/21/2014 10/06/2009   Comments:    Prevnar 1307/27/2016 [facility] PREVNAR 13 [id number] IM RD 07/27/2016 11/02/2015   Comments: tolerated well   Ayrfnseg50/01/2012 MSD ZOSTAVAX  SC NE 03/21/2014    Comments:          Review of Systems  · Constitutional  o Denies  o : fever, fatigue, weight loss, weight gain  · HENT  o Admits  o : thyroid mass  o Denies  o : neck tenderness  · Cardiovascular  o Denies  o : lower extremity edema, claudication, chest pressure, palpitations  · Respiratory  o Denies  o : shortness of breath, wheezing, cough, hemoptysis, dyspnea on exertion  · Gastrointestinal  o Admits  o : hemorrhoids, constipation, nausea, reflux  o Denies  o : vomiting, diarrhea, abdominal pain  · Genitourinary  o Admits  o : incontinence, vaginal burning  o Denies  o : urgency, frequency  · Musculoskeletal  o Admits  o : back pain      Vitals  Date Time BP Position Site L\R Cuff Size HR RR TEMP (F) WT  HT  BMI kg/m2 BSA m2 O2 Sat        07/10/2020 11:47 /88 Sitting    70 - R   111lbs 0oz 5'  2\" 20.3 1.48 98 %          Physical Examination  · Constitutional  o Appearance  o : alert, in no acute distress, well developed, well-nourished  · Ears, Nose, Mouth and Throat  o Throat  o : Oropharynx: no inflmation or lesions, Tonsils: within normal limits, throat -left thyroid nodule present  · Neck  o Inspection/Palpation  o : Supple, no masses or tenderness, no deformities, Trachea: Midline, ROM: with in normal limits, no neck stiffness, no lymphadenopathy  o Thyroid  o : no thyomegaly, no palpabale masses   · Respiratory  o Auscultation of Lungs  o : normal breath sounds throughout, no wheeze, rhonchi, or " crackles  · Cardiovascular  o Heart  o : Regular rate and rhythm, Normal S1,S2 , No cardiac murmers, No S3 or S4 gallop or rubs  · Skin and Subcutaneous Tissue  o General Inspection  o : no rashes, normal skin color, warm and dry  o Digits and Nails  o : no clubbing, cyanosis, deformities or edema present, normal appearing nails  · Neurologic  o Mental Status Examination  o : alert and oriented to time, place, and person. Gait and Station: normal gait, able to stand without difficulty  · Psychiatric  o Judgement and Insight  o : judgment and insight intact  o Mood and Affect  o : normal mood and affect              Assessment  · GERD (gastroesophageal reflux disease)     530.81/K21.9  pantoprazole 40mg bid not managing symptoms-discontinued pantoprazole and prescribed Nexium 20mg qd.  · Hyperlipidemia     272.4/E78.5  · Hypothyroidism     244.9/E03.9  · Vitamin D deficiency     268.9/E55.9  · Lumbar degenerative disc disease     722.52/M51.36  · Mitral valve replaced     V43.3/Z95.2  · Pacemaker     V45.01/Z95.0  · Urethral caruncle     599.3/N36.2  urethral caruncle surgery recently via -she reports waxing and waning burning sensation in the vagina. She uses estradiol cream prn it seems to decrease the burning.  · Back pain     724.5/M54.9  back pain-she is seeing  (chiropractor) and he is treating the back pain.  · Macular degeneration     362.50/H35.30  Dharmesh managing the MD.   · Hypertension, essential     401.9/I10  bp under good control-reports 125/66 at home this morning.   · Anxiety     300.02/F41.1  · Arthritis     V13.4/V13.4  spine  · Hemorrhoids     455.6/K64.9  taking rectal rockets prescribed by -reports they are controlling her symptoms. She has a follow up w/ on 7/15/20.   · Thyroid nodule     241.0/E04.1  appt w/ on 7/30/20 to discuss surgical options for thyroid nodule.  · Vitamin B12  deficiency     266.2/E53.8      Plan  · Orders  o Physical, Primary Care Panel (CBC, CMP, Lipid, TSH) [facility] (04839, 73459, 64221, 22123) - 401.9/I10, 244.9/E03.9, 272.4/E78.5, 241.0/E04.1 - 07/10/2020  o Vitamin D (25-Hydroxy) Level (39469) - 268.9/E55.9 - 07/10/2020  o ACO-39: Current medications updated and reviewed () - - 07/10/2020  o ACO-14: Influenza immunization administered or previously received () - - 07/10/2020  o Vitamin B12 level (15130) - 266.2/E53.8 - 07/10/2020  · Medications  o Nexium 20 mg oral capsule,delayed release(DR/EC)   SIG: take 1 capsule (20 mg) by oral route once daily at least 1 hour before a meal swallowing whole. Do not crush or chew granules.   DISP: (30) capsules with 0 refills  Prescribed on 07/10/2020     o pantoprazole 40 mg oral tablet,delayed release (DR/EC)   SIG: TAKE 1 TABLET BY MOUTH TWICE DAILY   DISP: (60) Tablet with 5 refills  Discontinued on 07/10/2020     o Medications have been Reconciled  o Transition of Care or Provider Policy  · Instructions  o Maintain a healthy weight. Avoid tight fitting clothes. Avoid fried, fatty foods, tomato sauce, chocolate, mint, garlic, onion, alcohol. caffeine. Eat smaller meals, dont lie down after a meal, dont smoke. Elevate the head of your bed 6-9 inches.  o Advised that cheeses and other sources of dairy fats, animal fats, fast food, and the extras (candy, pastries, pies, doughnuts and cookies) all contain LDL raising nutrients. Advised to increase fruits, vegetables, whole grains, and to monitor portion sizes.   o Take all medications as prescribed/directed.  o Patient was educated/instructed on their diagnosis, treatment and medications prior to discharge from the clinic today.  o Patient instructed to seek medical attention urgently for new or worsening symptoms.  o Call the office with any concerns or questions.  o 1 month follow up  · Disposition  o Call or Return if symptoms worsen or persist.            Electronically  Signed by: Kika Stevenson APRN -Author on July 14, 2020 11:43:02 AM

## 2021-05-13 NOTE — PROGRESS NOTES
Progress Note      Patient Name: Fabiana Herbert   Patient ID: [id number]   Sex: Female   YOB: 1934    Primary Care Provider: Kika VILLASENOR    Visit Date: September 29, 2020    Provider: Edgar Flynn DO   Location: [facility]   Location Address: [address] [place] [address], [address]  [place], [place]  [id number]   Location Phone: [phone]          Chief Complaint  · Possible UTI      History Of Present Illness  Fabiana Herbert is a 85 year old /White female who presents for evaluation and treatment of:      Patient presents today with a possible UTI.     States she has an increase in frequency with burning, is currently using Cortisone and Vaseline.     Patient also says she doesn't believe her Famotidine and Pantoprazole are doing anything for her reflux.       Past Medical History  Disease Name Date Onset Notes   Anemia --  --    Anxiety --  --    Arthritis --  --    Arthritis --  spine   Back pain --  --    Bone Density Screening 07/2015 osteoporosis   Cramps, muscle, general --  --    Difficulty in swallowing --  --    Dizziness --  --    Dizziness --  --    Dysuria 05/24/2019 --    EKG, abnormal 08/12/2016 --    Fatigue --  --    Headache --  --    Hearing loss --  --    Hyperlipidemia --  --    Hypertension, essential --  --    Hypothyroidism --  --    Imbalance --  --    Incontinence in female --  --    Ingrown toenail --  --    Insomnia --  --    Iron (Fe) deficiency anemia 02/01/2017 --    Lightheadedness 03/14/2018 The patient reports onset of lightheadedness following heart surgery in 2016. She notes that this tends to be brought on by a change in position. I would consider orthostasis as a potential culprit. I will attempt to pursue orthostatic blood pressures but the patient notes that it is hard for her to lay flat on the exam table. I will request her records be sent for my review.   Low back pain 06/09/2014 --    Lumbar degenerative disc disease  02/01/2017 --    Lumbar/LS disc degeneration 05/04/2014 --    Macular degeneration --  --    Macular degeneration --  [place] and [place]   Mitral Valve Disorders --  --    Mitral valve replaced 12/07/2017 --    Nocturia 05/24/2019 --    Pacemaker 02/23/2018 --    Pain, Joint --  --    Pain, Leg --  --    Pap smear for cervical cancer screening 2015 --    Peptic ulcer disease 02/01/2017 --    Ringing in ears --  --    Sciatica 01/09/2015 --    Screening Mammogram --  no longer   Shortness Of Air --  --    Urethral caruncle 05/24/2019 --    Vision changes --  --    Weakness --  --          Past Surgical History  Procedure Name Date Notes   Appendectomy 1951 --    Breast biopsy, left breast 1955 --    Cataract exctraction with lens implant 1992, 2001 left, right   Colon Surgery 1979 18 inches removed diverticulitis   Colonoscopy 2018 Dr. Alonso, Moderate Diverticulosis-2018   Cystoscopy --  2/18/20 Cystoscopy, Excision of Urethral Prolapse w/    EGD 2018 --    Excision of urethral prolapse --  2/18/20 Cystoscopy, Excision of Urethral Prolapse w/    Hemorrhoidectomy 2009/2014 hemorrhoid banding   Hysterectomy 1960's total   Lumbar laminectomy 7/2007 --    Mitral valve replacement 11/17/16  in [facility] November 2016 MRI compatible   Wrist --  left         Medication List  Name Date Started Instructions   Bystolic 10 mg oral tablet 03/30/2020 TAKE 1 TABLET BY MOUTH EVERY NIGHT AT BEDTIME   Cortizone-10 1 % topical cream  apply a thin layer to the affected area(s) by topical route as needed   Desitin 13 % topical cream  apply to affected area(s) by topical route   estradiol 0.01 % (0.1 mg/gram) vaginal cream 06/09/2020 INSERT 1 GRAM VAGINALLY EVERY DAY   famotidine 20 mg oral tablet 08/12/2020 TAKE 1 TABLET(20 MG) BY MOUTH EVERY DAY AT BEDTIME   fluticasone propionate 50 mcg/actuation nasal spray,suspension 11/03/2019 SPRAY 1 - 2 SPRAYS IN EACH NOSTRIL BY INTRANASAL ROUTE ONCE DAILY    hydrochlorothiazide 12.5 mg oral capsule 05/18/2020 TAKE ONE CAPSULE BY MOUTH EVERY DAY   hydrocortisone 2.5 % topical cream 08/07/2020 apply a thin layer to the affected area(s) by topical route 2 times per day for 14 days   hydroxyzine HCl 25 mg oral tablet 07/15/2019 one po BID PRN   levothyroxine 50 mcg oral tablet 04/30/2020 TAKE 1 TABLET(50 MCG) BY MOUTH EVERY DAY   lidocaine 5 % topical adhesive patch,medicated 03/25/2020 APPLY 2 PATCHES EXTERNALLY TO THE SKIN EVERY DAY. MAY WEAR UP TO 12 HOURS   LIDOCAINE 5% PATCH 10/08/2020 APPLY 2 PATCHES EXTERNALLY TO THE SKIN EVERY DAY. MAY WEAR UP TO 12 HOURS   lorazepam 0.5 mg oral tablet 09/29/2020 take 1 tablet (0.5 mg) by oral route once daily as needed for 30 days   losartan 25 mg oral tablet 09/14/2020 TAKE 1 TABLET BY MOUTH EVERY DAY   Miralax 17 gram/dose oral powder  take 17 gram mixed with 8 oz. water, juice, soda, coffee or tea by oral route once daily   nystatin 100,000 unit/gram topical powder 09/18/2019 apply to the affected area(s) by topical route 2 times per day   Pepcid 20 mg oral tablet 08/12/2020 take 1 tablet (20 mg) by oral route once daily at bedtime   prevegen memory vitamin  1 cap daily   Probiotic oral  take 2 tablets one AM and one PM   Tylenol oral  --    Vision Formula (with lutein) 1,000 unit-200 mg-60 unit-2 mg oral tablet  take 1 tablet by oral route QD (OTC)   Vitamin D3 1,000 unit oral capsule  take 1 capsule by oral route daily   zinc oxide topical  using on vaginal area as needed daily         Allergy List  Allergen Name Date Reaction Notes   Ambien --  insomnia --    aspirin --  --  --    Bactrim DS --  --  upset stomach   Boniva --  --  --    Cipro --  --  --    ciprofloxacin --  arrhythmia/lightheadedness/fatigue --    Cymbalta --  upset stomach --    levofloxacin --  syncope/leg pain --    Lyrica --  --  --    metronidazole --  --  --    nitrofurantoin --  --  upset stomach   Pamelor --  back pain/hangover (nortriptyline)    ranitidine HCl --  --  --    tramadol --  hangover --          Family Medical History  Disease Name Relative/Age Notes   Heart Disease Aunt/  Father/  Son/  Uncle/   --    Diabetes, unspecified type Father/  Son/   --    - No Family History of Colorectal Cancer  --          Reproductive History  Menstrual   Age Menarche: 14   Pregnancy Summary   Total Pregnancies: 2 Full Term: 2 Premature: 0   Ab Induced: 0 Ab Spontaneous: 0 Ectopics: 0   Multiples: 0 Livin         Social History  Finding Status Start/Stop Quantity Notes   Active but no formal exercise --  --/-- --  --    Alcohol Never --/-- --  2020 - 07/10/2020 - 2020 - 2019 - 2019 - 10/30/2019 - 2019 - 2019 - 2019 - 2018 -     --  --/-- --  --    No known infection risk --  --/-- --  --    Not sexually active --  --/-- --  --    Tobacco Never --/-- --  --          Review of Systems  · Constitutional  o Denies  o : fatigue, night sweats  · Eyes  o Denies  o : double vision, blurred vision  · HENT  o Denies  o : vertigo, recent head injury  · Cardiovascular  o Denies  o : chest pain, irregular heart beats  · Respiratory  o Denies  o : shortness of breath, productive cough  · Gastrointestinal  o Admits  o : reflux  o Denies  o : nausea, vomiting  · Genitourinary  o Admits  o : urgency, frequency, dysuria  o Denies  o : urinary retention  · Integument  o Denies  o : hair growth change, new skin lesions  · Neurologic  o Denies  o : altered mental status, seizures  · Musculoskeletal  o Denies  o : joint swelling, limitation of motion  · Endocrine  o Denies  o : cold intolerance, heat intolerance  · Psychiatric  o Denies  o : anxiety, depression  · Heme-Lymph  o Denies  o : petechiae, lymph node enlargement or tenderness  · Allergic-Immunologic  o Denies  o : frequent illnesses      Vitals  Date Time BP Position Site L\R Cuff Size HR RR TEMP (F) WT  HT  BMI kg/m2 BSA m2 O2 Sat FR L/min FiO2 HC       2020  "10:00 AM       16  110lbs 0oz 5'  2\" 20.12 1.48       09/15/2020 02:53 PM       16  108lbs 0oz 5'  2\" 19.75 1.46       09/29/2020 02:01 /81 Sitting    83 - R  97.8 110lbs 1oz 5'  2\" 20.13 1.48 96 %  21%          Physical Examination  · Constitutional  o Appearance  o : alert, oriented, in no acute distress, well developed, well-nourished  · Eyes  o Vision  o : Conjuntivae: Normal, Sclerae white, Pupils: PERRL, Cornea: Clear, no lesions bilateral  · Ears, Nose, Mouth and Throat  o Ears  o : Ext. Ears: Normal shape, Non tender, EACs: Normal , Tragus intact bilaterally, Hearing: intact to conversational voice bilaterally  o Nose  o : No nasal discharge, Mucosa: normal, Septum: midline, Sinuses: Nontender  o Throat  o : Oropharynx: no inflmation or lesions, no purulence or drainage  · Neck  o Inspection/Palpation  o : Supple, no masses or tenderness, no deformities, Trachea: Midline, ROM: with in normal limits, no neck stiffness, no lymphadenopathy  o Thyroid  o : no thyromegaly, no palpabale masses  · Respiratory  o Auscultation of Lungs  o : normal breath sounds throughout, no wheeze, rhonchi, or crackles  · Cardiovascular  o Heart  o : Regular rate and rhythm, Normal S1,S2 , No cardiac murmers, No S3 or S4 gallop or rubs  · Gastrointestinal  o Abdominal Examination  o : abdomen soft, nontender, non distended, no rigidity, gaurding, rebound tenderness, no ventral hernias present  o Liver and spleen  o : no hepatomegaly present, liver nontender to palpation, spleen not palpable  · Musculoskeletal  o General  o :   § General Musculoskeletal  § : No joint swelling or deformity., Muscle tone, strength, and development grossly normal.  · Skin and Subcutaneous Tissue  o General Inspection  o : no rashes on visible skin, normal skin color, warm and dry  o Digits and Nails  o : no clubbing, cyanosis, deformities or edema present, normal appearing nails  · Neurologic  o Mental Status Examination  o : alert and oriented " to time, place, and person. Gait and Station: normal gait, able to stand without difficulty. CN 2-12 grossly intact   · Psychiatric  o Judgement and Insight  o : judgment and insight intact  o Mood and Affect  o : normal mood and affect          Results  · In-Office Procedures  o Lab procedure  § IOP - Urinalysis without Microscopy (Clinitek) [facility] ([id number])   § Color Ur: Gretchen   § Clarity Ur: Clear   § Glucose Ur Ql Strip: Negative   § Bilirub Ur Ql Strip: Negative   § Ketones Ur Ql Strip: Negative   § Sp Gr Ur Qn: 1.015   § Hgb Ur Ql Strip: Trace-Intact   § pH Ur-LsCnc: 6.0   § Prot Ur Ql Strip: Trace   § Urobilinogen Ur Strip-mCnc: 0.2 E.U./dL   § Nitrite Ur Ql Strip: Positive   § WBC Est Ur Ql Strip: Negative       Assessment  · GERD (gastroesophageal reflux disease)     530.81/K21.9  · Urinary tract infection     599.0/N39.0  · Urinary frequency     788.41/R35.0  · Burning with urination     788.1/R30.0  · Dysuria     788.1/R30.0    Problems Reconciled  Plan  · Orders  o Urine Culture (Clean Catch) ([id number], [id number]) - 599.0/N39.0 - 09/29/2020  o ACO-39: Current medications updated and reviewed (, 1159F) - - 09/29/2020  o ACO-14: Influenza immunization was not administered for reasons documented () - - 09/29/2020   patient refused  · Medications  o cefdinir 300 mg oral capsule   SIG: take 1 capsule (300 mg) by oral route every 12 hours for 5 days   DISP: (10) capsules with 0 refills  Prescribed on 09/29/2020     o Medications have been Reconciled  o Transition of Care or Provider Policy  · Instructions  o Maintain a healthy weight. Avoid tight fitting clothes. Avoid fried, fatty foods, tomato sauce, chocolate, mint, garlic, onion, alcohol. caffeine. Eat smaller meals, dont lie down after a meal, dont smoke. Elevate the head of your bed 6-9 inches.  o Increase fluid intake. If you develop fever, chills, N/V, flank pain, or worsening of your symptoms return to the office or go to the ER.  o Rest. Increase  Fluids.  o Patient was educated/instructed on their diagnosis, treatment and medications prior to discharge from the clinic today.  o Patient instructed to seek medical attention urgently for new or worsening symptoms.  o Call the office with any concerns or questions.  o Minutes spent with patient including greater than 50% in Education/Counseling/Care Coordination.  o Time spent with the patient was minutes, more than 50% face to face.  o Chronic conditions reviewed and taken into consideration for today's treatment plan.  o Discussed Covid-19 precautions including, but not limited to, social distancing, avoid touching your face, and hand washing.   · Disposition  o Call or Return if symptoms worsen or persist.            Electronically Signed by: Edgar Flynn DO -Author on October 13, 2020 01:41:02 AM

## 2021-05-13 NOTE — PROGRESS NOTES
Progress Note      Patient Name: Fabiana Herbert   Patient ID: [id number]   Sex: Female   YOB: 1934    Primary Care Provider: Kika VILLASENOR   Referring Provider: Kika VILLASENOR    Visit Date: October 22, 2020    Provider: JACKLYN Carpio   Location: [facility]   Location Address: [address] [place] [address]  [id number]   Location Phone: [phone]          Chief Complaint  · pt here for urologic issues      History Of Present Illness  The patient presents for follow-up for a urethral caruncle, urinary incontinence and dysuria; the burning has improved slightly but it comes and goes, she is still with symptoms but improved. She is also with leaking, not improving and wants something to help with that.   Symptoms are dysuria. The dysuria has been severe and was first noted approximately for awhile. She has had a lot of burning, vaginal itching. The quality of the symptoms is burning with a gradual onset. She is severely bothered by the symptoms. There are no additional symptoms. There are no aggravating factors. The patient is not constipated.   She has had prior treatment. No relief has been obtained with antibiotics and or estrace cream; but pyridium has worked when taken daily. The most recent antibiotic was taken months ago.        She has been using Estrace cream daily since I saw her last.  She states she uses it every time she uses the bathroom.  She is using Vaseline as well.      She continues to have burning and she states it burns constantly worse after she urinates but is constantly present.  It is better with Vaseline.      Patient had a excision of her urethral polyp done in the operating room earlier this year.  She is emptying well.     The patient about 20 oz of water a day. She drinks decaf coffee and fruit juice daily as well.    She drinks tomato juice nearly daily as well.     She states she gets up several times a night on some days.    She  c/o urinary leakage nearly all day.       Past Medical History  Anemia; Anxiety; Arthritis; Arthritis; Back pain; Bone Density Screening; Cramps, muscle, general; Difficulty in swallowing; Dizziness; Dizziness; Dysuria; EKG, abnormal; Fatigue; Headache; Hearing loss; Hyperlipidemia; Hypertension, essential; Hypothyroidism; Imbalance; Incontinence in female; Ingrown toenail; Insomnia; Iron (Fe) deficiency anemia; Lightheadedness; Low back pain; Lumbar degenerative disc disease; Lumbar/LS disc degeneration; Macular degeneration; Macular degeneration; Mitral Valve Disorders; Mitral valve replaced; Nocturia; Pacemaker; Pain, Joint; Pain, Leg; Pap smear for cervical cancer screening; Peptic ulcer disease; Ringing in ears; Sciatica; Screening Mammogram; Shortness Of Air; Urethral caruncle; Vision changes; Weakness         Past Surgical History  Appendectomy; Breast biopsy, left breast; Cataract exctraction with lens implant; Colon Surgery; Colonoscopy; Cystoscopy; EGD; Excision of urethral prolapse; Hemorrhoidectomy; Hysterectomy; Lumbar laminectomy; Mitral valve replacement; Pacemaker; Wrist         Medication List  Bystolic 10 mg oral tablet; Cortizone-10 1 % topical cream; Desitin 13 % topical cream; dicyclomine 20 mg oral tablet; estradiol 0.01 % (0.1 mg/gram) vaginal cream; famotidine 20 mg oral tablet; fluticasone propionate 50 mcg/actuation nasal spray,suspension; hydrochlorothiazide 12.5 mg oral capsule; hydrocortisone 2.5 % topical cream; hydroxyzine HCl 25 mg oral tablet; levothyroxine 50 mcg oral tablet; lidocaine 5 % topical adhesive patch,medicated; lorazepam 0.5 mg oral tablet; losartan 25 mg oral tablet; Miralax 17 gram/dose oral powder; nystatin 100,000 unit/gram topical powder; Pepcid 20 mg oral tablet; prevegen memory vitamin; Probiotic oral; Tylenol oral; Vision Formula (with lutein) 1,000 unit-200 mg-60 unit-2 mg oral tablet; Vitamin D3 1,000 unit oral capsule; zinc oxide topical         Allergy  "List  Ambien; aspirin; Bactrim DS; Boniva; Cipro; ciprofloxacin; Cymbalta; levofloxacin; Lyrica; metronidazole; nitrofurantoin; Pamelor; ranitidine HCl; tramadol         Family Medical History  Heart Disease; Diabetes, unspecified type; - No Family History of Colorectal Cancer         Reproductive History   2 Para 2 0 0 2       Social History  Active but no formal exercise; Alcohol (Never); ; No known infection risk; Not sexually active; Tobacco (Never)         Immunizations  Name Date Admin   Influenza 10/03/2019   Influenza 10/24/2018   Influenza 10/25/2017   Influenza 10/10/2016   Influenza 10/20/2015   Pneumococcal 2012   Prevnar 13 2016   Shingles 2012         Review of Systems  · Constitutional  o Denies  o : fatigue, fever  · Cardiovascular  o Denies  o : chest pain, heart disease, heart attack  · Respiratory  o Denies  o : lung disease, shortness of breath, asthma  · Gastrointestinal  o Denies  o : stomach or bowel disease, blood in stools, ulcers  · Genitourinary  o Admits  o : frequent urination , urinary leakage, painful urination  o Denies  o : kidney or bladder infections, urgency or urination, difficulty voiding, interrupted stream, voiding at night, straining to urinate, blood in urine  · Neurologic  o Denies  o : neurologic disease  · Musculoskeletal  o Denies  o : swelling or pain in your lower extremities      Vitals  Date Time BP Position Site L\R Cuff Size HR RR TEMP (F) WT  HT  BMI kg/m2 BSA m2 O2 Sat FR L/min FiO2        10/22/2020 11:20 AM       17  109lbs 0oz 5'  2\" 19.94 1.47             Physical Examination  · Ears, Nose, Mouth and Throat  o Ears  o :   § External Ears  § : appearance within normal limits, no lesions present  o Nose  o :   § External Nose  § : appearance normal  · Respiratory  o Inspection of Chest  o : normal appearance, no retractions  · Musculoskeletal  o Pelvis  o : no pelvic bony or muscular tenderness  o Ribs  o : no deformities or " tenderness to palpation present, costochondral junctions nontender to palpation  · Neurologic  o Mental Status Examination  o :   § Speech/Language  § : communication ability within normal limits          Results  · In-Office Procedures  o Lab procedure  § Automated dipstick urinalysis with microscopy ([id number])   § Color Ur: Yellow   § Clarity Ur: Clear   § Glucose Ur Ql Strip: Negative   § Bilirub Ur Ql Strip: Negative   § Ketones Ur Ql Strip: Negative   § Sp Gr Ur Qn: 1.025   § Hgb Ur Ql Strip: Negative   § pH Ur-LsCnc: 6.0   § Prot Ur Ql Strip: 30 mg/dL   § Urobilinogen Ur Strip-mCnc: 0.2 E.U./dL   § Nitrite Ur Ql Strip: Negative   § WBC Est Ur Ql Strip: Negative   § RBC UrnS Qn HPF: 0   § WBC UrnS Qn HPF: 0   § Bacteria UrnS Qn HPF: few   § Crystals UrnS Qn HPF: 0   § Epithelial Cells (non renal): 0 /HPF  § Epithelial Cells (renal): 0       Assessment  · Urethral caruncle     599.3/N36.2  · Dysuria     788.1/R30.0  · Urethral caruncle     599.3/N36.2  · Urinary incontinence     788.30/R32  · Urethral caruncle     599.3/N36.2  · Dysuria     788.1/R30.0  · Nocturia     788.43/R35.1  · Urinary frequency     788.41/R35.0  · Vaginal itching     698.1/N89.8  · Malodorous urine     791.9/R82.90  · Candidiasis     112.9/B37.9  · Cystitis     595.9/N30.90  · Hemorrhoids, complicated     455.8/K64.8      Plan  · Medications  o estradiol 0.01 % (0.1 mg/gram) vaginal cream   SIG: insert 1 gram by vaginal route 3 times per week at night   DISP: (42.5) Gram with 2 refills  Adjusted on 10/22/2020     o Medications have been Reconciled  o Transition of Care or Provider Policy  · Instructions  o She will continue her Estrace Cream and use nightly 3x a week. She will increase her fluid intake and decrease the acidic drinks in her diet. we discussed double voiding to ensure she is emptying her bladder fully. Will f/u in office in 6 months or sooner if need be.   o Plan Of Care: f/u with patient in office in 6  months  o Electronically Identified Patient Education Materials Provided Electronically            Electronically Signed by: JACKLYN Carpio -Author on October 22, 2020 01:42:12 PM

## 2021-05-13 NOTE — PROGRESS NOTES
Progress Note      Patient Name: Fabiana Herbert   Patient ID: [id number]   Sex: Female   YOB: 1934    Primary Care Provider: Kika VILLASENOR    Visit Date: July 16, 2020    Provider: Xuan Ibanez MD   Location: [facility]   Location Address: [address] [place] [address]  [id number]   Location Phone: [phone]          History Of Present Illness  The patient presents for follow-up for a urethral caruncle, urinary incontinence and dysuria; the burning has improved slightly, she is still with symptoms but improved. She is also with leaking, not improving and wants something to help with that.   Symptoms are dysuria. The dysuria has been severe and was first noted approximately for awhile. She has had a lot of burning, vaginal itching, rectal itching and odor with urine in the past but this is been better. She has been seen by her PCP and treatment has not gotten any better. The quality of the symptoms is burning with a gradual onset. She is severely bothered by the symptoms. There are no additional symptoms. There are no aggravating factors. The patient is not constipated.   She has had prior treatment. No relief has been obtained with antibiotics and or estrace cream; but pyridium has worked when taken daily. The most recent antibiotic was taken months ago.   TELEHEALTH TELEPHONE VISIT  Chief Complaint: CHIEF COMPLAINT   Fabiana Herbert is a 85 year old /White female who is presenting for evaluation via telehealth telephone visit. Verbal consent obtained before beginning visit.   Provider spent 5 minutes with the patient during the telehealth visit.   The following staff were present during this visit: Katie Davis and Xuan Ibanez MD   Past Medical History/ Overview of Patient Symptoms     She has been using Estrace cream daily since I saw her last.  She is not using it now.  She is happier using Vaseline.      She continues to have burning and mainly the burning is  just to the left of her urethra on the inner labia.  She states it burns constantly worse after she urinates but is constantly present.  It is better with Vaseline.      Patient had a excision of her urethral polyp done in the operating room a few months ago.  She is emptying well.     She has done well but now has constipation and is having major pain with her hemmorhoids.  She had surgery for them in the past with Dr. Alonso.  The suppositories worked well but they have come back.             Past Medical History  Anemia; Anxiety; Arthritis; Arthritis; Back pain; Bone Density Screening; Cramps, muscle, general; Difficulty in swallowing; Dizziness; Dizziness; Dysuria; EKG, abnormal; Fatigue; Headache; Hearing loss; Hyperlipidemia; Hypertension, essential; Hypothyroidism; Imbalance; Incontinence in female; Ingrown toenail; Insomnia; Iron (Fe) deficiency anemia; Lightheadedness; Low back pain; Lumbar degenerative disc disease; Lumbar/LS disc degeneration; Macular degeneration; Macular degeneration; Mitral Valve Disorders; Mitral valve replaced; Nocturia; Pacemaker; Pain, Joint; Pain, Leg; Pap smear for cervical cancer screening; Peptic ulcer disease; Ringing in ears; Sciatica; Screening Mammogram; Shortness Of Air; Urethral caruncle; Vision changes; Weakness         Past Surgical History  Appendectomy; Breast biopsy, left breast; Cataract exctraction with lens implant; Colon Surgery; Colonoscopy; Cystoscopy; EGD; Excision of urethral prolapse; Hemorrhoidectomy; Hysterectomy; Lumbar laminectomy; Mitral valve replacement; Pacemaker; Wrist         Medication List  Bystolic 10 mg oral tablet; Cortizone-10 1 % topical cream; Desitin 13 % topical cream; esomeprazole magnesium 20 mg oral capsule,delayed release(DR/EC); estradiol 0.01 % (0.1 mg/gram) vaginal cream; fluticasone propionate 50 mcg/actuation nasal spray,suspension; hydrochlorothiazide 12.5 mg oral capsule; hydroxyzine HCl 25 mg oral tablet; levothyroxine 50 mcg  oral tablet; lidocaine 5 % topical adhesive patch,medicated; lorazepam 0.5 mg oral tablet; losartan 25 mg oral tablet; Miralax 17 gram/dose oral powder; Nexium 20 mg oral capsule,delayed release(DR/EC); nystatin 100,000 unit/gram topical powder; prevegen memory vitamin; Probiotic oral; Rectal Rockets 2.5 %-2% Suppository; Tylenol oral; Vision Formula (with lutein) 1,000 unit-200 mg-60 unit-2 mg oral tablet; Vitamin D3 1,000 unit oral capsule; zinc oxide topical         Allergy List  Ambien; aspirin; Bactrim DS; Boniva; Cipro; ciprofloxacin; Cymbalta; levofloxacin; Lyrica; metronidazole; nitrofurantoin; Pamelor; ranitidine HCl; tramadol         Family Medical History  Heart Disease; Diabetes, unspecified type; - No Family History of Colorectal Cancer         Reproductive History   2 Para 2 0 0 2       Social History  Active but no formal exercise; Alcohol (Never); ; No known infection risk; Not sexually active; Tobacco (Never)         Immunizations  Name Date Admin   Influenza    Influenza    Influenza    Influenza    Influenza    Pneumococcal    Prevnar 13    Shingles                  Assessment  · Urethral caruncle     599.3/N36.2  · Dysuria     788.1/R30.0  · Urethral caruncle     599.3/N36.2  · Urinary incontinence     788.30/R32  · Urethral caruncle     599.3/N36.2  · Dysuria     788.1/R30.0  · Nocturia     788.43/R35.1  · Urinary frequency     788.41/R35.0  · Vaginal itching     698.1/N89.8  · Malodorous urine     791.9/R82.90  · Candidiasis     112.9/B37.9  · Cystitis     595.9/N30.90  · Hemorrhoids, complicated     455.8/K64.8      Plan  · Orders  o Physican Telephone evaluation, 5-10 min (74726) - 599.3/N36.2, 788.43/R35.1 - 2020  · Instructions  o I sent in rectal rocket suppository for her and if that is not effective I will refer her to general surgery. She can continue to use Vaseline as needed near her urethra for intermittent burning.   o Plan Of Care:             Electronically  Signed by: Xuan Ibanez MD -Author on August 5, 2020 01:16:22 PM

## 2021-05-13 NOTE — PROGRESS NOTES
Progress Note      Patient Name: Fabiana Herbert   Patient ID: [id number]   Sex: Female   YOB: 1934    Primary Care Provider: Kika VILLASENOR    Visit Date: September 4, 2020    Provider: Pedrito Rios MD   Location: [facility]   Location Address: [address] [place] [address]  [id number]   Location Phone: [phone]          Chief Complaint  · Follow Up Office Visit      History Of Present Illness     Fabiana came back for follow-up. She is doing okay following a two quadrant hemorrhoid banding. She still has a little bit of pressure when she sits but otherwise is doing okay. She is not having any bleeding.       Past Medical History  Anemia; Anxiety; Arthritis; Arthritis; Back pain; Bone Density Screening; Cramps, muscle, general; Difficulty in swallowing; Dizziness; Dizziness; Dysuria; EKG, abnormal; Fatigue; Headache; Hearing loss; Hyperlipidemia; Hypertension, essential; Hypothyroidism; Imbalance; Incontinence in female; Ingrown toenail; Insomnia; Iron (Fe) deficiency anemia; Lightheadedness; Low back pain; Lumbar degenerative disc disease; Lumbar/LS disc degeneration; Macular degeneration; Macular degeneration; Mitral Valve Disorders; Mitral valve replaced; Nocturia; Pacemaker; Pain, Joint; Pain, Leg; Pap smear for cervical cancer screening; Peptic ulcer disease; Ringing in ears; Sciatica; Screening Mammogram; Shortness Of Air; Urethral caruncle; Vision changes; Weakness         Past Surgical History  Appendectomy; Breast biopsy, left breast; Cataract exctraction with lens implant; Colon Surgery; Colonoscopy; Cystoscopy; EGD; Excision of urethral prolapse; Hemorrhoidectomy; Hysterectomy; Lumbar laminectomy; Mitral valve replacement; Pacemaker; Wrist         Medication List  Bystolic 10 mg oral tablet; Cortizone-10 1 % topical cream; Desitin 13 % topical cream; estradiol 0.01 % (0.1 mg/gram) vaginal cream; famotidine 20 mg oral tablet; fluticasone propionate 50  "mcg/actuation nasal spray,suspension; hydrochlorothiazide 12.5 mg oral capsule; hydrocortisone 2.5 % topical cream; hydroxyzine HCl 25 mg oral tablet; levothyroxine 50 mcg oral tablet; lidocaine 5 % topical adhesive patch,medicated; lorazepam 0.5 mg oral tablet; losartan 25 mg oral tablet; Miralax 17 gram/dose oral powder; nystatin 100,000 unit/gram topical powder; Pepcid 20 mg oral tablet; prevegen memory vitamin; Probiotic oral; Rectal Rockets 2.5 %-2% Suppository; Tylenol oral; Vision Formula (with lutein) 1,000 unit-200 mg-60 unit-2 mg oral tablet; Vitamin D3 1,000 unit oral capsule; zinc oxide topical         Allergy List  Ambien; aspirin; Bactrim DS; Boniva; Cipro; ciprofloxacin; Cymbalta; levofloxacin; Lyrica; metronidazole; nitrofurantoin; Pamelor; ranitidine HCl; tramadol         Family Medical History  Heart Disease; Diabetes, unspecified type; - No Family History of Colorectal Cancer         Reproductive History   2 Para 2 0 0 2       Social History  Active but no formal exercise; Alcohol (Never); ; No known infection risk; Not sexually active; Tobacco (Never)         Review of Systems  · Cardiovascular  o Denies  o : chest pain, irregular heart beats, rapid heart rate, chest pain on exertion, shortness of breath, lower extremity swelling  · Respiratory  o Denies  o : shortness of breath, wheezing, cough, wheezing, chronic cough, coughing up blood  · Gastrointestinal  o Denies  o : nausea, vomiting, diarrhea, chronic abdominal pain, reflux symptoms      Vitals  Date Time BP Position Site L\R Cuff Size HR RR TEMP (F) WT  HT  BMI kg/m2 BSA m2 O2 Sat HC       2020 10:00 AM       16  110lbs 0oz 5'  2\" 20.12 1.48           Physical Examination     Today on physical exam, her abdomen is soft.           Assessment  · Postoperative Exam Following Surgery     V67.00       Doing fine status post hemorrhoid banding.       Plan  · Medications  o Medications have been " Reconciled  · Instructions  o Follow up as needed.            Electronically Signed by: Aliza Dawson-, -Author on September 4, 2020 11:01:37 AM  Electronically Co-signed by: Pedrito Rios MD -Reviewer on September 4, 2020 02:02:16 PM

## 2021-05-13 NOTE — PROGRESS NOTES
Progress Note      Patient Name: Fabiana Herbert   Patient ID: [id number]   Sex: Female   YOB: 1934    Primary Care Provider: Kika VILLASENOR    Visit Date: July 30, 2020    Provider: Graham Gonzalez PA-C   Location: [facility]   Location Address: [address] [place] [address]  [id number]   Location Phone: [phone]          History Of Present Illness  TELEHEALTH TELEPHONE VISIT  Chief Complaint: GERD/dysphagia   Fabiana Herbert is a 85 year old /White female who is presenting for evaluation via telehealth telephone visit. Verbal consent obtained before beginning visit.   Provider spent 11 minutes with the patient during the telehealth visit.   The following staff were present during this visit: Delaney Dawson MA   Past Medical History/ Overview of Patient Symptoms     85-year-old female who was seen today via telehealth (patient did not have means for video chat) for follow up of dysphagia.  This has been stable and chronic for over a year.  She denies significant weight loss, nausea, vomiting, reflux symptoms.  She did have open heart surgery in 2016 the mitral valve replacement and subsequent pacemaker placement.  She denies any anticoagulation.  She did have modified barium swallow showing sondra aspiration barium.  She was scheduled to undergo repeat testing with speech pathology, but was unable to follow through.  The patient is doing well and is not interested in follow up any further for her dysphagia.  Her dysphagia occurs once every 3-4 weeks, however she is able to control with diet.  She denies GERD symptoms.       Past Medical History  Anemia; Anxiety; Arthritis; Arthritis; Back pain; Bone Density Screening; Cramps, muscle, general; Difficulty in swallowing; Dizziness; Dizziness; Dysuria; EKG, abnormal; Fatigue; Headache; Hearing loss; Hyperlipidemia; Hypertension, essential; Hypothyroidism; Imbalance; Incontinence in female; Ingrown toenail; Insomnia; Iron (Fe)  deficiency anemia; Lightheadedness; Low back pain; Lumbar degenerative disc disease; Lumbar/LS disc degeneration; Macular degeneration; Macular degeneration; Mitral Valve Disorders; Mitral valve replaced; Nocturia; Pacemaker; Pain, Joint; Pain, Leg; Pap smear for cervical cancer screening; Peptic ulcer disease; Ringing in ears; Sciatica; Screening Mammogram; Shortness Of Air; Urethral caruncle; Vision changes; Weakness         Past Surgical History  Appendectomy; Breast biopsy, left breast; Cataract exctraction with lens implant; Colon Surgery; Colonoscopy; Cystoscopy; EGD; Excision of urethral prolapse; Hemorrhoidectomy; Hysterectomy; Lumbar laminectomy; Mitral valve replacement; Pacemaker; Wrist         Medication List  Bystolic 10 mg oral tablet; Cortizone-10 1 % topical cream; Desitin 13 % topical cream; esomeprazole magnesium 20 mg oral capsule,delayed release(DR/EC); estradiol 0.01 % (0.1 mg/gram) vaginal cream; fluticasone propionate 50 mcg/actuation nasal spray,suspension; hydrochlorothiazide 12.5 mg oral capsule; hydroxyzine HCl 25 mg oral tablet; levothyroxine 50 mcg oral tablet; lidocaine 5 % topical adhesive patch,medicated; lorazepam 0.5 mg oral tablet; losartan 25 mg oral tablet; Miralax 17 gram/dose oral powder; Nexium 20 mg oral capsule,delayed release(DR/EC); nystatin 100,000 unit/gram topical powder; prevegen memory vitamin; Probiotic oral; Rectal Rockets 2.5 %-2% Suppository; Tylenol oral; Vision Formula (with lutein) 1,000 unit-200 mg-60 unit-2 mg oral tablet; Vitamin D3 1,000 unit oral capsule; zinc oxide topical         Allergy List  Ambien; aspirin; Bactrim DS; Boniva; Cipro; ciprofloxacin; Cymbalta; levofloxacin; Lyrica; metronidazole; nitrofurantoin; Pamelor; ranitidine HCl; tramadol       Allergies Reconciled  Family Medical History  Heart Disease; Diabetes, unspecified type; - No Family History of Colorectal Cancer         Reproductive History   2 Para 2 0 0 2       Social  History  Active but no formal exercise; Alcohol (Never); ; No known infection risk; Not sexually active; Tobacco (Never)         Review of Systems  · Constitutional  o Denies  o : fatigue, night sweats  · Eyes  o Denies  o : double vision, blurred vision  · HENT  o Denies  o : vertigo, recent head injury  · Breasts  o Denies  o : abnormal changes in breast size, additional breast symptoms except as noted in the HPI  · Cardiovascular  o Denies  o : chest pain, irregular heart beats  · Respiratory  o Denies  o : shortness of breath, productive cough  · Gastrointestinal  o Denies  o : nausea, vomiting  · Genitourinary  o Denies  o : dysuria, urinary retention  · Integument  o Denies  o : hair growth change, new skin lesions  · Neurologic  o Denies  o : altered mental status, seizures  · Musculoskeletal  o Denies  o : joint swelling, limitation of motion  · Endocrine  o Denies  o : cold intolerance, heat intolerance  · Heme-Lymph  o Denies  o : petechiae, lymph node enlargement or tenderness  · Allergic-Immunologic  o Denies  o : frequent illnesses              Assessment  · Dysphagia     787.20/R13.10      Plan  · Orders  o Physician Telephone Evaluation, 11-20 minutes (87117) - 787.20/R13.10 - 07/30/2020  · Medications  o Medications have been Reconciled  o Transition of Care or Provider Policy  · Instructions  o Plan Of Care: 85 year old with several co-morbidities with stable dysphagia. We discussed barium swallow and EGD, but the patient doesn't want to pursue at this time do dysphagia being minimal. We agree the risks outweigh the benefits. The patient will call if she changes her mind and follow up as needed.            Electronically Signed by: YUE Miranda-DEMETRICE -Author on July 30, 2020 11:45:27 AM  Electronically Co-signed by: Stanislav Anderson MD -Reviewer on August 1, 2020 08:36:18 AM

## 2021-05-13 NOTE — PROGRESS NOTES
Progress Note      Patient Name: Fabiana Herbert   Patient ID: [id number]   Sex: Female   YOB: 1934    Primary Care Provider: Kika VILLASENOR    Visit Date: August 12, 2020    Provider: JACKLYN Boateng   Location: [facility]   Location Address: [address] [place] [address], [address]  [place], [place]  [id number]   Location Phone: [phone]          Chief Complaint  · 1 MONTH F/U PPI      History Of Present Illness  TELEHEALTH TELEPHONE VISIT  Fabiana Herbert is a 85 year old /White female who is presenting for evaluation via telehealth telephone visit. Verbal consent obtained before beginning visit.   Provider spent 20 minutes with patient during telehealth visit.   The following staff were present during this visit: Kika VILLASENOR   Past Medical History/Overview of Patient Symptoms  Fabiana Herbert is a 85 year old /White female who presents for evaluation and treatment of:      Patient is here today on phone call for 1 month follow up on heartburn. Says she stopped taking esomeprazole due to her stomach being more upset when she took it. She went back to taking pantoprazole and says it is better than it was.    she has a hemorrhoidectomy vijaya w/ next wk-she is going to have banding. She is vijaya to have a COVID test 8/15/20.     She saw  last wk-her pacemaker is working well-good for another 4 yrs. He did not make any changes to any medication. he gave  surgical clearance. He reviewed her labs and was not concerned about the kidney function-he is going to recheck her renal function in 6 wks. She stated he did not feel she needed to see nephrology at this time.    dysuria-she followed up w/ last month. She gave her a cream which did not seem to help. States the vasoline works better than anything she has tried but is not taking care of the problem. Denies urinary urgency, back pain, nausea, vomiting, or frequency, change in color, hematuria.        Past Medical History  Disease Name Date Onset Notes   Anemia --  --    Anxiety --  --    Arthritis --  --    Arthritis --  spine   Back pain --  --    Bone Density Screening 07/2015 osteoporosis   Cramps, muscle, general --  --    Difficulty in swallowing --  --    Dizziness --  --    Dizziness --  --    Dysuria 05/24/2019 --    EKG, abnormal 08/12/2016 --    Fatigue --  --    Headache --  --    Hearing loss --  --    Hyperlipidemia --  --    Hypertension, essential --  --    Hypothyroidism --  --    Imbalance --  --    Incontinence in female --  --    Ingrown toenail --  --    Insomnia --  --    Iron (Fe) deficiency anemia 02/01/2017 --    Lightheadedness 03/14/2018 The patient reports onset of lightheadedness following heart surgery in 2016. She notes that this tends to be brought on by a change in position. I would consider orthostasis as a potential culprit. I will attempt to pursue orthostatic blood pressures but the patient notes that it is hard for her to lay flat on the exam table. I will request her records be sent for my review.   Low back pain 06/09/2014 --    Lumbar degenerative disc disease 02/01/2017 --    Lumbar/LS disc degeneration 05/04/2014 --    Macular degeneration --  --    Macular degeneration --  Dharmesh   Mitral Valve Disorders --  --    Mitral valve replaced 12/07/2017 --    Nocturia 05/24/2019 --    Pacemaker 02/23/2018 --    Pain, Joint --  --    Pain, Leg --  --    Pap smear for cervical cancer screening 2015 --    Peptic ulcer disease 02/01/2017 --    Ringing in ears --  --    Sciatica 01/09/2015 --    Screening Mammogram --  no longer   Shortness Of Air --  --    Urethral caruncle 05/24/2019 --    Vision changes --  --    Weakness --  --          Past Surgical History  Procedure Name Date Notes   Appendectomy 1951 --    Breast biopsy, left breast 1955 --    Cataract exctraction with lens implant 1992, 2001 left, right   Colon Surgery 1979 18 inches removed diverticulitis    Colonoscopy 2018 Dr. Alonso, Moderate Diverticulosis-2018   Cystoscopy --  2/18/20 Cystoscopy, Excision of Urethral Prolapse w/    EGD 2018 --    Excision of urethral prolapse --  2/18/20 Cystoscopy, Excision of Urethral Prolapse w/    Hemorrhoidectomy 2009/2014 hemorrhoid banding   Hysterectomy 1960's total   Lumbar laminectomy 7/2007 --    Mitral valve replacement 11/17/16  in [facility] November 2016 MRI compatible   Wrist --  left         Medication List  Name Date Started Instructions   Bystolic 10 mg oral tablet 03/30/2020 TAKE 1 TABLET BY MOUTH EVERY NIGHT AT BEDTIME   Cortizone-10 1 % topical cream  apply a thin layer to the affected area(s) by topical route as needed   Desitin 13 % topical cream  apply to affected area(s) by topical route   estradiol 0.01 % (0.1 mg/gram) vaginal cream 06/09/2020 INSERT 1 GRAM VAGINALLY EVERY DAY   famotidine 20 mg oral tablet 08/12/2020 TAKE 1 TABLET(20 MG) BY MOUTH EVERY DAY AT BEDTIME   fluticasone propionate 50 mcg/actuation nasal spray,suspension 11/03/2019 SPRAY 1 - 2 SPRAYS IN EACH NOSTRIL BY INTRANASAL ROUTE ONCE DAILY   hydrochlorothiazide 12.5 mg oral capsule 05/18/2020 TAKE ONE CAPSULE BY MOUTH EVERY DAY   hydrocortisone 2.5 % topical cream 08/07/2020 apply a thin layer to the affected area(s) by topical route 2 times per day for 14 days   hydroxyzine HCl 25 mg oral tablet 07/15/2019 one po BID PRN   levothyroxine 50 mcg oral tablet 04/30/2020 TAKE 1 TABLET(50 MCG) BY MOUTH EVERY DAY   lidocaine 5 % topical adhesive patch,medicated 03/25/2020 APPLY 2 PATCHES EXTERNALLY TO THE SKIN EVERY DAY. MAY WEAR UP TO 12 HOURS   lorazepam 0.5 mg oral tablet 07/13/2020 take 1 tablet (0.5 mg) by oral route once daily as needed for 30 days   losartan 25 mg oral tablet 02/24/2020 take 1 tablet by oral route QD   Miralax 17 gram/dose oral powder  take 17 gram mixed with 8 oz. water, juice, soda, coffee or tea by oral route once daily    nystatin 100,000 unit/gram topical powder 2019 apply to the affected area(s) by topical route 2 times per day   prevegen memory vitamin  1 cap daily   Probiotic oral  take 2 tablets one AM and one PM   Rectal Rockets 2.5 %-2% Suppository 2020 1 per rectum QHS prn   Tylenol oral  --    Vision Formula (with lutein) 1,000 unit-200 mg-60 unit-2 mg oral tablet  take 1 tablet by oral route QD (OTC)   Vitamin D3 1,000 unit oral capsule  take 1 capsule by oral route daily   zinc oxide topical  using on vaginal area as needed daily         Allergy List  Allergen Name Date Reaction Notes   Ambien --  insomnia --    aspirin --  --  --    Bactrim DS --  --  upset stomach   Boniva --  --  --    Cipro --  --  --    ciprofloxacin --  arrhythmia/lightheadedness/fatigue --    Cymbalta --  upset stomach --    levofloxacin --  syncope/leg pain --    Lyrica --  --  --    metronidazole --  --  --    nitrofurantoin --  --  upset stomach   Pamelor --  back pain/hangover (nortriptyline)   ranitidine HCl --  --  --    tramadol --  hangover --        Allergies Reconciled  Family Medical History  Disease Name Relative/Age Notes   Heart Disease Aunt/  Father/  Son/  Uncle/   --    Diabetes, unspecified type Father/  Son/   --    - No Family History of Colorectal Cancer  --          Reproductive History  Menstrual   Age Menarche: 14   Pregnancy Summary   Total Pregnancies: 2 Full Term: 2 Premature: 0   Ab Induced: 0 Ab Spontaneous: 0 Ectopics: 0   Multiples: 0 Livin         Social History  Finding Status Start/Stop Quantity Notes   Active but no formal exercise --  --/-- --  --    Alcohol Never --/-- --  2020 - 07/10/2020 - 2020 - 2019 - 2019 - 10/30/2019 - 2019 - 2019 - 2019 - 2018 -     --  --/-- --  --    No known infection risk --  --/-- --  --    Not sexually active --  --/-- --  --    Tobacco Never --/-- --  --          Immunizations  NameDate Admin Mfg Trade Name  Lot Number Route Inj VIS Given VIS Publication   Bmlfyqrbr26/03/2019 NE Not Entered  NE NE 10/01/2019    Comments: Myra administered vaccine   Myjaolbmc45/24/2018 SKB Fluzone Quadrivalent  NE NE 05/13/2019    Comments:    Nebgkelts91/25/2017 PMC FLUZONE-HIGH DOSE [id number] IM RD 10/25/2017 08/07/2015   Comments: Pt tolerated well.   Rshfwlwhy90/10/2016 SKB Fluarix, quadrivalent, preservative free [id number] IM RD 10/10/2016 08/07/2015   Comments: tolerated well   Agsrttxrr18/20/2015 SKB Fluarix, quadrivalent, preservative free [id number] IM LD 10/20/2015 08/07/2015   Comments:    Dsucierannlg84/01/2012 MSD PNEUMOVAX 23  IM NE 03/21/2014 10/06/2009   Comments:    Prevnar 1307/27/2016 [facility] PREVNAR 13 [id number] IM RD 07/27/2016 11/02/2015   Comments: tolerated well   Wczfsmud87/01/2012 MSD ZOSTAVAX  SC NE 03/21/2014    Comments:          Review of Systems  · Constitutional  o Denies  o : fever, fatigue, weight loss, weight gain  · Cardiovascular  o Denies  o : lower extremity edema, claudication, chest pressure, palpitations  · Respiratory  o Denies  o : shortness of breath, wheezing, cough, hemoptysis, dyspnea on exertion  · Gastrointestinal  o Admits  o : reflux  o Denies  o : nausea, vomiting, diarrhea, constipation, abdominal pain  · Genitourinary  o Admits  o : dysuria  o Denies  o : urgency, frequency, hematuria, change in urine color      Physical Examination  · Constitutional  o Appearance  o : alert, in no acute distress, well developed, well-nourished  · Skin and Subcutaneous Tissue  o General Inspection  o : no rashes, normal skin color, warm and dry  o Digits and Nails  o : no clubbing, cyanosis, deformities or edema present, normal appearing nails  · Neurologic  o Mental Status Examination  o : alert and oriented to time, place, and person. Gait and Station: normal gait, able to stand without difficulty  · Psychiatric  o Judgement and Insight  o : judgment and insight intact  o Mood and Affect  o : normal mood and  affect              Assessment  · GERD (gastroesophageal reflux disease)     530.81/K21.9  continues to c/o hb symptoms even after switching back to pantoprazole-added Pepcid 20mg qd. Scheduled a 1m follow up via phone call.  · Dysuria     788.1/R30.0  continues to c/o dysuria w/urination-she followed up w/ last month. She gave her a cream which did not seem to help. States the vasoline works better than anything she has tried but is not taking care of the problem. Denies urinary urgency, back pain, nausea, vomiting, or frequency, change in color, hematuria. Ordered a U/A-she will stop by the office latter today to leave specimen.  · Pacemaker     V45.01/Z95.0  pacemaker checked last wk-working well-has 4 yrs left on the battery.   · Urethral caruncle     599.3/N36.2  · Hemorrhoid     455.6/K64.9  she has a hemorrhoidectomy w/banding next week w/.  · Renal insufficiency     593.9/N28.9  referred to nephrology for renal insuff-she discussed her renal insuff w/ and he does not feel she needs to see nephrology at this time-he is repeating renal fx in 6 wks.       Plan  · Orders  o ACO-39: Current medications updated and reviewed () - - 08/12/2020  o ACO-14: Influenza immunization administered or previously received () - - 08/12/2020  o Physician Telephone Evaluation, 11-20 minutes ([id number]) - - 08/12/2020  o Urine Culture [facility] ([id number]) - - 08/12/2020  · Medications  o Pepcid 20 mg oral tablet   SIG: take 1 tablet (20 mg) by oral route once daily at bedtime   DISP: (30) tablets with 0 refills  Prescribed on 08/12/2020     o Medications have been Reconciled  o Transition of Care or Provider Policy  · Instructions  o Maintain a healthy weight. Avoid tight fitting clothes. Avoid fried, fatty foods, tomato sauce, chocolate, mint, garlic, onion, alcohol. caffeine. Eat smaller meals, dont lie down after a meal, dont smoke. Elevate the head of your bed 6-9 inches.  o Plan Of Care:   o Take all  medications as prescribed/directed.  o Call the office with any concerns or questions.  o Discussed Covid-19 precautions including, but not limited to, social distancing, avoid touching your face, and hand washing.   o 1 month follow up GERD  · Disposition  o Call or Return if symptoms worsen or persist.            Electronically Signed by: Kika Stevenson APRN -Author on August 16, 2020 11:07:00 AM

## 2021-05-13 NOTE — PROGRESS NOTES
Progress Note      Patient Name: Fabiana Herbert   Patient ID: [id number]   Sex: Female   YOB: 1934    Primary Care Provider: Kika VILLASENOR    Visit Date: August 5, 2020    Provider: Daniel Anthony MD   Location: [facility]   Location Address: [address] [place] [address] [address]   [place], [place]  [id number]   Location Phone: [phone]          Chief Complaint     Shortness of breath.       History Of Present Illness  REFERRING PROVIDER: Kika VILLASENOR   Fabiana Herbert is an 85 year old /White female who continues to be short of breath that is mild with moderate exertion and is long-term and normal for her. She has occasional palpitations. Denies any chest pain. No swelling, dizziness, syncope, PND, or orthopnea. She is having issues with her hemorrhoids and would like clearance for hemorrhoid banding. She walks some, but not on a regular basis. Her weight is the same.   PAST MEDICAL HISTORY: Chronic kidney disease; Mitral valve replacement; Pacemaker.   FAMILY HISTORY: Negative for diabetes mellitus, hypertension, or heart disease.   PSYCHOSOCIAL HISTORY: Denies alcohol or tobacco use. Denies mood changes or depression.   CURRENT MEDICATIONS: Hydrochlorothiazide 12.5 mg daily; Bystolic 10 mg daily; losartan 25 mg daily; probiotic 2 tablets b.i.d.; Viteyes; Prevagen daily; levothyroxine 50 mcg daily; lorazepam 0.5 mg p.r.n.; hydroxyzine 25 mg p.r.n.; mupirocin topical cream; MiraLax p.r.n.; Rectal Rockets 2.5 %-2% suppository; pantoprazole 40 mg b.i.d.; estradiol p.r.n..       Review of Systems  · Cardiovascular  o Admits  o : palpitations (fast, fluttering, or skipping beats), shortness of breath while walking or lying flat  o Denies  o : swelling (feet, ankles, hands), chest pain or angina pectoris   · Respiratory  o Denies  o : chronic or frequent cough, asthma or wheezing      Vitals  Date Time BP Position Site L\R Cuff Size HR RR TEMP (F) WT  HT  BMI kg/m2 BSA m2 O2  "Sat        08/05/2020 02:22 /92 Sitting    74 - R   111lbs 0oz 5'  2\" 20.3 1.48     08/05/2020 02:22 /84 Sitting    72 - R                 Physical Examination  · Constitutional  o Appearance  o : Awake, alert, in no acute distress, somewhat hard-of-hearing, ambulates slowly.  · Eyes  o Conjunctivae  o : Conjunctivae normal.  · Ears, Nose, Mouth and Throat  o Oral Cavity  o :   § Oral Mucosa  § : Normal.  · Neck  o Jugular Veins  o : No JVD. Good carotid upstroke. No bruits noted.  · Respiratory  o Respiratory  o : Kyphosis with diminished breath sounds. Prolonged expiration. Negative rales or rhonchi.   · Cardiovascular  o Heart  o : PMI is displaced. S1, S2 regular. No S3. No S4. 1-2/6 systolic murmur at the apex. Negative diastolic murmur.   o Peripheral Vascular System  o :   § Extremities  § : Good femoral and pedal pulses. No pedal edema.  · Gastrointestinal  o Abdominal Examination  o : Soft. No tenderness or masses felt. No hepatosplenomegaly. Abdominal aorta is not palpable.  · Labs  o Labs  o : Hemoglobin 13.6, hematocrit 42.3. BUN 30, creatinine 1.46. Total cholesterol 165, triglycerides 168, HDL 48, LDL 83 without any cholesterol meds. TSH is 2.20.  · Device Interrogation  o Device Interrogation  o : Pacemaker was checked today. Middle-of-life parameters for a generator with an estimated 4 years left. Atrial paced 61% of the time. V-paced 77% of the time. No high rate episodes.  · Data  o Data  o : Home blood pressures reviewed and range between 111/69 to 155/85.           Assessment     1.  Shortness of breath, stable.    2.  Bioprosthetic mitral valve.  3.  Chronic kidney disease, stage 3.  4.  Normally functioning pacemaker.    5.  Hypertension with degree of white coat syndrome, controlled at home.       Plan     1.  From a cardiac standpoint, she is okay for the hemorrhoid banding.    2.  She declined to a Nephrology referral at this time.    3.  Follow up in 6 months with labs or " earlier if needed.      JACKLYN Gamino/Daniel Anthony MD, [facility]  JF:PM:vm               Electronically Signed by: Angelica Castanon-, Other -Author on August 6, 2020 03:36:48 PM  Electronically Co-signed by: JACKLYN Hernandes -Reviewer on August 7, 2020 09:34:42 AM  Electronically Co-signed by: Daniel Anthony MD -Reviewer on August 9, 2020 04:30:30 PM

## 2021-05-13 NOTE — PROGRESS NOTES
Progress Note      Patient Name: Fabiana Herbert   Patient ID: [id number]   Sex: Female   YOB: 1934    Primary Care Provider: Kika VILLSAENOR   Referring Provider: Kika VILLASENOR    Visit Date: November 11, 2020    Provider: JACKLYN Boateng   Location: [facility]   Location Address: [address] [place] [address], [address]  [place], [place]  [id number]   Location Phone: [phone]          Chief Complaint  · 4 MONTHS F/U      History Of Present Illness  Fabiana Herbert is a 85 year old /White female who presents for evaluation and treatment of:      Patient is here today for 4 month follow up.    Says she hurt all over. Also says she think Pepcid is not working for her stomach. Prescribed Nexium but she has not started taking it yet-instructed to stop pantoprazole and start Nexium and continue Pepcid.    she had her hemorrhoids banded x 2 by . States she needs it again but she is not going to have it done again. She had a colonoscopy w/banding but no improvement. She had diverticulosis     macular degeneration-she has been getting injections in her eyes-states she is loosing her vision.     leakage from the vagina-the leaks since her surgery by  in Feb for the cyst. Scheduled consult w/urogyn.    she is doing a dose of mirilax-states in the morning she has a good bm. States the bowels feel like they need to move but they don't. She has been taking stool softener but it does not help.     GERD-she takes Pepcid, pantoprazole-states the are not helping-reports when she drinks water she gets left lower quadrant pain that radiates across the abdomen. States if she drinks anything else it does not hurt.     states she did not end up seeing -she did a COVID test but she waited for a couple hours then went home-he ended up calling but she did not schedule appt.           Past Medical History  Disease Name Date Onset Notes   Anemia --  --    Anxiety --  --     Arthritis --  --    Arthritis --  spine   Back pain --  --    Bone Density Screening 07/2015 osteoporosis   Cramps, muscle, general --  --    Difficulty in swallowing --  --    Dizziness --  --    Dizziness --  --    Dysuria 05/24/2019 --    EKG, abnormal 08/12/2016 --    Fatigue --  --    Headache --  --    Hearing loss --  --    Hyperlipidemia --  --    Hypertension, essential --  --    Hypothyroidism --  --    Imbalance --  --    Incontinence in female --  --    Ingrown toenail --  --    Insomnia --  --    Iron (Fe) deficiency anemia 02/01/2017 --    Lightheadedness 03/14/2018 The patient reports onset of lightheadedness following heart surgery in 2016. She notes that this tends to be brought on by a change in position. I would consider orthostasis as a potential culprit. I will attempt to pursue orthostatic blood pressures but the patient notes that it is hard for her to lay flat on the exam table. I will request her records be sent for my review.   Low back pain 06/09/2014 --    Lumbar degenerative disc disease 02/01/2017 --    Lumbar/LS disc degeneration 05/04/2014 --    Macular degeneration --  --    Macular degeneration --  Dharmesh   Mitral Valve Disorders --  --    Mitral valve replaced 12/07/2017 --    Nocturia 05/24/2019 --    Pacemaker 02/23/2018 --    Pain, Joint --  --    Pain, Leg --  --    Pap smear for cervical cancer screening 2015 --    Peptic ulcer disease 02/01/2017 --    Ringing in ears --  --    Sciatica 01/09/2015 --    Screening Mammogram --  no longer   Shortness Of Air --  --    Urethral caruncle 05/24/2019 --    Vision changes --  --    Weakness --  --          Past Surgical History  Procedure Name Date Notes   Appendectomy 1951 --    Breast biopsy, left breast 1955 --    Cataract exctraction with lens implant 1992, 2001 left, right   Colon Surgery 1979 18 inches removed diverticulitis   Colonoscopy 2018 Dr. Alonso, Moderate Diverticulosis-2018   Cystoscopy --  2/18/20  Cystoscopy, Excision of Urethral Prolapse w/    EGD 2018 --    Excision of urethral prolapse --  2/18/20 Cystoscopy, Excision of Urethral Prolapse w/    Hemorrhoidectomy 2009/2014 hemorrhoid banding   Hysterectomy 1960's total   Lumbar laminectomy 7/2007 --    Mitral valve replacement 11/17/16  in [facility] November 2016 MRI compatible   Wrist --  left         Medication List  Name Date Started Instructions   Bystolic 10 mg oral tablet 03/30/2020 TAKE 1 TABLET BY MOUTH EVERY NIGHT AT BEDTIME   Cortizone-10 1 % topical cream  apply a thin layer to the affected area(s) by topical route as needed   Desitin 13 % topical cream  apply to affected area(s) by topical route   dicyclomine 20 mg oral tablet 10/16/2020 take 1 tablet (20 mg) by oral route 3 times per day   estradiol 0.01 % (0.1 mg/gram) vaginal cream 11/05/2020 INSERT 1 GRAM VAGINALLY EVERY DAY   famotidine 20 mg oral tablet 08/12/2020 TAKE 1 TABLET(20 MG) BY MOUTH EVERY DAY AT BEDTIME   fluticasone propionate 50 mcg/actuation nasal spray,suspension 11/03/2019 SPRAY 1 - 2 SPRAYS IN EACH NOSTRIL BY INTRANASAL ROUTE ONCE DAILY   hydrochlorothiazide 12.5 mg oral capsule 05/18/2020 TAKE ONE CAPSULE BY MOUTH EVERY DAY   hydrocortisone 2.5 % topical cream 08/07/2020 apply a thin layer to the affected area(s) by topical route 2 times per day for 14 days   hydroxyzine HCl 25 mg oral tablet 07/15/2019 one po BID PRN   levothyroxine 50 mcg oral tablet 04/30/2020 TAKE 1 TABLET(50 MCG) BY MOUTH EVERY DAY   lidocaine 5 % topical adhesive patch,medicated 03/25/2020 APPLY 2 PATCHES EXTERNALLY TO THE SKIN EVERY DAY. MAY WEAR UP TO 12 HOURS   lorazepam 0.5 mg oral tablet 09/29/2020 take 1 tablet (0.5 mg) by oral route once daily as needed for 30 days   losartan 25 mg oral tablet 09/14/2020 TAKE 1 TABLET BY MOUTH EVERY DAY   Miralax 17 gram/dose oral powder  take 17 gram mixed with 8 oz. water, juice, soda, coffee or tea by oral route once  daily   nystatin 100,000 unit/gram topical powder 2019 apply to the affected area(s) by topical route 2 times per day   Pepcid 20 mg oral tablet 2020 take 1 tablet (20 mg) by oral route once daily at bedtime   prevegen memory vitamin  1 cap daily   Probiotic oral  take 2 tablets one AM and one PM   Tylenol oral  --    Vision Formula (with lutein) 1,000 unit-200 mg-60 unit-2 mg oral tablet  take 1 tablet by oral route QD (OTC)   Vitamin D3 1,000 unit oral capsule  take 1 capsule by oral route daily   zinc oxide topical  using on vaginal area as needed daily         Allergy List  Allergen Name Date Reaction Notes   Ambien --  insomnia --    aspirin --  --  --    Bactrim DS --  --  upset stomach   Boniva --  --  --    Cipro --  --  --    ciprofloxacin --  arrhythmia/lightheadedness/fatigue --    Cymbalta --  upset stomach --    levofloxacin --  syncope/leg pain --    Lyrica --  --  --    metronidazole --  --  --    nitrofurantoin --  --  upset stomach   Pamelor --  back pain/hangover (nortriptyline)   ranitidine HCl --  --  --    tramadol --  hangover --          Family Medical History  Disease Name Relative/Age Notes   Heart Disease Aunt/  Father/  Son/  Uncle/   --    Diabetes, unspecified type Father/  Son/   --    - No Family History of Colorectal Cancer  --          Reproductive History  Menstrual   Age Menarche: 14   Pregnancy Summary   Total Pregnancies: 2 Full Term: 2 Premature: 0   Ab Induced: 0 Ab Spontaneous: 0 Ectopics: 0   Multiples: 0 Livin         Social History  Finding Status Start/Stop Quantity Notes   Active but no formal exercise --  --/-- --  --    Alcohol Never --/-- --  2020 - 2020 - 07/10/2020 - 2020 - 2019 - 2019 - 10/30/2019 - 2019 - 2019 - 2019 - 2018 -     --  --/-- --  --    No known infection risk --  --/-- --  --    Not sexually active --  --/-- --  --    Tobacco Never --/-- --  --           Immunizations  NameDate Admin Mfg Trade Name Lot Number Route Inj VIS Given VIS Publication   [id number]/11/2020 PMC Fluzone Quadrivalent [id number] IM LD 11/11/2020 08/15/2019   Comments: PATIENT TOLERATED WELL.   Ydxoewhfsrga63/01/2012 MSD PNEUMOVAX 23  IM NE 03/21/2014 10/06/2009   Comments:    Prevnar 1307/27/2016 WAL PREVNAR 13 [id number] IM RD 07/27/2016 11/02/2015   Comments: tolerated well   Pxpnhbau83/01/2012 MSD ZOSTAVAX  SC NE 03/21/2014    Comments:          Review of Systems  · Constitutional  o Denies  o : fatigue, night sweats  · Eyes  o Denies  o : double vision, blurred vision  · HENT  o Denies  o : vertigo, recent head injury  · Cardiovascular  o Denies  o : chest pain, irregular heart beats  · Respiratory  o Denies  o : shortness of breath, productive cough  · Gastrointestinal  o Admits  o : constipation, hemorrhoids, reflux  o Denies  o : nausea, vomiting  · Genitourinary  o Admits  o : urinary leakage  o Denies  o : dysuria, urinary retention  · Integument  o Denies  o : hair growth change, new skin lesions  · Neurologic  o Denies  o : altered mental status, seizures  · Musculoskeletal  o Denies  o : joint swelling, limitation of motion  · Endocrine  o Denies  o : cold intolerance, heat intolerance  · Heme-Lymph  o Denies  o : petechiae, lymph node enlargement or tenderness  · Allergic-Immunologic  o Denies  o : frequent illnesses      Vitals  Date Time BP Position Site L\R Cuff Size HR RR TEMP (F) WT  HT  BMI kg/m2 BSA m2 O2 Sat FR L/min FiO2        11/11/2020 12:19 /88 Sitting    77 - R   110lbs 2oz    98 %            Physical Examination  · Constitutional  o Appearance  o : alert, in no acute distress, well developed, well-nourished  · Ears, Nose, Mouth and Throat  o Ears  o : Ext. Ears: Normal shape, Non tender, EACs: Normal , TMs: Normal, Hearing: intact to conversational voice bilaterally  o Nose  o : No nasal discharge, Mucosa: normal, Septum: midline, Sinuses:  Nontender  o Throat  o : Oropharynx: no inflmation or lesions, Tonsils: within normal limits  · Neck  o Inspection/Palpation  o : mass present left side of neck-non mobile  o Thyroid  o : no thyomegaly, no palpabale masses   · Respiratory  o Auscultation of Lungs  o : normal breath sounds throughout, no wheeze, rhonchi, or crackles  · Cardiovascular  o Heart  o : Regular rate and rhythm, Normal S1,S2 , No cardiac murmers, No S3 or S4 gallop or rubs  · Skin and Subcutaneous Tissue  o General Inspection  o : no rashes, normal skin color, warm and dry  o Digits and Nails  o : no clubbing, cyanosis, deformities or edema present, normal appearing nails  · Neurologic  o Mental Status Examination  o : alert and oriented to time, place, and person. Gait and Station: normal gait, able to stand without difficulty  · Psychiatric  o Judgement and Insight  o : judgment and insight intact  o Mood and Affect  o : normal mood and affect              Assessment  · GERD (gastroesophageal reflux disease)     530.81/K21.9  instructed to start Nexium daily, discontinue pantoprazole and continue Pepcid. Ordered Hpylori workup as well.  · Hypothyroidism     244.9/E03.9  · Need for influenza vaccination     V04.81/Z23  administered today.  · Iron (Fe) deficiency anemia     280.9/D50.9  · Lumbar degenerative disc disease     722.52/M51.36  · Pacemaker     V45.01/Z95.0  · Sciatica     724.3/M54.30  · Macular degeneration     362.50/H35.30  Dharmesh  · Hypertension, essential     401.9/I10  · Anxiety     300.02/F41.1  · Insomnia     780.52/G47.00  · Arthritis     V13.4/V13.4  spine  · OAB (overactive bladder)     596.51/N32.81  · Hemorrhoids     455.6/K64.9  · Constipation     564.00/K59.00  · Neck mass     784.2/R22.1  left sided nonmobile neck mass noted on exam today-ordered thyroid U/S and ENT consult.       Plan  · Orders  o Immunization Admin Fee (Single) ([facility]) (81919) - V04.81/Z23 - 11/11/2020  o Fluzone Quadrivalent Vaccine,  age 6 months + (42397) - V04.81/Z23 - 11/11/2020   Vaccine - Influenza; Dose: 0.5; Site: Left Deltoid; Route: Intramuscular; Date: 11/11/2020 17:34:00; Exp: 06/30/2021; Lot: [id number]; Mfg: sanofi pasteur; TradeName: Fluzone Quadrivalent; Administered By: Renuka Hill MA; Comment: PATIENT TOLERATED WELL.  o TSH [facility] (99769) - 244.9/E03.9 - 11/11/2020  o ACO-39: Current medications updated and reviewed (, 1159F) - - 11/11/2020  o ACO-14: Influenza immunization administered or previously received [facility] () - - 11/11/2020  o ENT CONSULTATION (ENTCO) - 784.2/R22.1 - 11/11/2020  o Thyroid Ultrasound. (80569) - 244.9/E03.9, 784.2/R22.1 - 11/11/2020  o Urogynecology Consultation (URGYN) - 596.51/N32.81 - 11/11/2020   after the first of the year.   o H pylori IgA ab (64852) - 530.81/K21.9 - 11/11/2020  o H pylori IgG ab (24361) - 530.81/K21.9 - 11/11/2020  o H pylori IgM ab (82670) - 530.81/K21.9 - 11/11/2020  · Medications  o Medications have been Reconciled  o Transition of Care or Provider Policy  · Instructions  o Maintain a healthy weight. Avoid tight fitting clothes. Avoid fried, fatty foods, tomato sauce, chocolate, mint, garlic, onion, alcohol. caffeine. Eat smaller meals, dont lie down after a meal, dont smoke. Elevate the head of your bed 6-9 inches.  o Take all medications as prescribed/directed.  o Patient was educated/instructed on their diagnosis, treatment and medications prior to discharge from the clinic today.  o Patient instructed to seek medical attention urgently for new or worsening symptoms.  o Call the office with any concerns or questions.  o 1 month follow up  o Electronically Identified Patient Education Materials Provided Electronically  · Disposition  o Call or Return if symptoms worsen or persist.            Electronically Signed by: JACKLYN Boateng -Author on November 15, 2020 05:55:23 PM

## 2021-05-13 NOTE — PROGRESS NOTES
"   Progress Note      Patient Name: Fabiana Herbert   Patient ID: [id number]   Sex: Female   YOB: 1934    Primary Care Provider: Kika VILLASENOR   Referring Provider: Kika VILLASENOR    Visit Date: December 11, 2020    Provider: JACKLYN Boateng   Location: [facility]   Location Address: [address] [place] [address], [address]  [place], [place]  [id number]   Location Phone: [phone]          Chief Complaint  · 1 MONTH F/U      History Of Present Illness  Fabiana Herbert is a 86 year old /White female who presents for evaluation and treatment of:      \">Patient is here today for 1 month follow up.     States she need Lorazepam refills. UDS and [place] UTD. States she takes it at hs to help her sleep. States sometimes she has trouble turning her mind off-she reads till 10-12:30, she has country music and light in her face. States she sleeps better if there is light.  Discussed decreasing stimulation 30min -1 hour prior to bedtime.     vertigo -admits her dizziness is getting a little worse. Hers occurs most when standing. She has been checked for orthostatic hypotension which was neg.     hemorrhoid not any better-she had banding twice -she had them cut out twice in the past -she does not want to have it again. States if they hurt bad she uses suppositories. States  does not want her using them. States she has to watch what she eats. She takes mirilax at night. Admits the bowels move fine in the am but not during the day. Admits mixing the prune juice in coffee has helped with the constipation.    hypothyroid-she had an U/S and CT of the neck-she has an appt w/ at the end of the month due to the dysphagia. Admits she will even get choked drinking water. She uses aspercreme on the left side and the thyroid and it improves the swallowing. Reports the left side of the throat aches and feels sore at times.     vaginal burning-she is using vasoline and zinc oxide " cream. She uses estradiol from time to time. She deferred going to gyn in [place].    arthritis-admits her joints hurt-she puts aspercremen on her knees-she will wake up during the night an places more aspercreme on her knee.     MD-she is getting shots in her eye for the MD>       Past Medical History  Disease Name Date Onset Notes   Anemia --  --    Anxiety --  --    Arthritis --  --    Arthritis --  spine   Back pain --  --    Bone Density Screening 07/2015 osteoporosis   Cramps, muscle, general --  --    Difficulty in swallowing --  --    Dizziness --  --    Dizziness --  --    Dysuria 05/24/2019 --    EKG, abnormal 08/12/2016 --    Fatigue --  --    Headache --  --    Hearing loss --  --    Hyperlipidemia --  --    Hypertension, essential --  --    Hypothyroidism --  --    Imbalance --  --    Incontinence in female --  --    Ingrown toenail --  --    Insomnia --  --    Iron (Fe) deficiency anemia 02/01/2017 --    Lightheadedness 03/14/2018 The patient reports onset of lightheadedness following heart surgery in 2016. She notes that this tends to be brought on by a change in position. I would consider orthostasis as a potential culprit. I will attempt to pursue orthostatic blood pressures but the patient notes that it is hard for her to lay flat on the exam table. I will request her records be sent for my review.   Low back pain 06/09/2014 --    Lumbar degenerative disc disease 02/01/2017 --    Lumbar/LS disc degeneration 05/04/2014 --    Macular degeneration --  --    Macular degeneration --  [place] and [place]   Mitral Valve Disorders --  --    Mitral valve replaced 12/07/2017 --    Nocturia 05/24/2019 --    Pacemaker 02/23/2018 --    Pain, Joint --  --    Pain, Leg --  --    Pap smear for cervical cancer screening 2015 --    Peptic ulcer disease 02/01/2017 --    Ringing in ears --  --    Sciatica 01/09/2015 --    Screening Mammogram --  no longer   Shortness Of Air --  --    Urethral caruncle 05/24/2019 --     Vision changes --  --    Weakness --  --          Past Surgical History  Procedure Name Date Notes   Appendectomy 1951 --    Breast biopsy, left breast 1955 --    Cataract exctraction with lens implant 1992, 2001 left, right   Colon Surgery 1979 18 inches removed diverticulitis   Colonoscopy 2018 Dr. Alonso, Moderate Diverticulosis-2018   Cystoscopy --  2/18/20 Cystoscopy, Excision of Urethral Prolapse w/    EGD 2018 --    Excision of urethral prolapse --  2/18/20 Cystoscopy, Excision of Urethral Prolapse w/    Hemorrhoidectomy 2009/2014 hemorrhoid banding   Hysterectomy 1960's total   Lumbar laminectomy 7/2007 --    Mitral valve replacement 11/17/16  in [facility] November 2016 MRI compatible   Wrist --  left         Medication List  Name Date Started Instructions   Bystolic 10 mg oral tablet 03/30/2020 TAKE 1 TABLET BY MOUTH EVERY NIGHT AT BEDTIME   Cortizone-10 1 % topical cream  apply a thin layer to the affected area(s) by topical route as needed   Desitin 13 % topical cream  apply to affected area(s) by topical route   dicyclomine 20 mg oral tablet 10/16/2020 take 1 tablet (20 mg) by oral route 3 times per day   estradiol 0.01 % (0.1 mg/gram) vaginal cream 11/05/2020 INSERT 1 GRAM VAGINALLY EVERY DAY   famotidine 20 mg oral tablet 08/12/2020 TAKE 1 TABLET(20 MG) BY MOUTH EVERY DAY AT BEDTIME   fluticasone propionate 50 mcg/actuation nasal spray,suspension 11/03/2019 SPRAY 1 - 2 SPRAYS IN EACH NOSTRIL BY INTRANASAL ROUTE ONCE DAILY   hydrochlorothiazide 12.5 mg oral capsule 05/18/2020 TAKE ONE CAPSULE BY MOUTH EVERY DAY   hydrocortisone 2.5 % topical cream 08/07/2020 apply a thin layer to the affected area(s) by topical route 2 times per day for 14 days   hydroxyzine HCl 25 mg oral tablet 07/15/2019 one po BID PRN   levothyroxine 50 mcg oral tablet 04/30/2020 TAKE 1 TABLET(50 MCG) BY MOUTH EVERY DAY   lidocaine 5 % topical adhesive patch,medicated 03/25/2020 APPLY 2  PATCHES EXTERNALLY TO THE SKIN EVERY DAY. MAY WEAR UP TO 12 HOURS   lorazepam 0.5 mg oral tablet 2020 take 1 tablet (0.5 mg) by oral route once daily as needed for 30 days   losartan 25 mg oral tablet 2020 TAKE 1 TABLET BY MOUTH EVERY DAY   Miralax 17 gram/dose oral powder  take 17 gram mixed with 8 oz. water, juice, soda, coffee or tea by oral route once daily   nystatin 100,000 unit/gram topical powder 2019 apply to the affected area(s) by topical route 2 times per day   Pepcid 20 mg oral tablet 2020 take 1 tablet (20 mg) by oral route once daily at bedtime   prevegen memory vitamin  1 cap daily   Probiotic oral  take 2 tablets one AM and one PM   Tylenol oral  --    Vision Formula (with lutein) 1,000 unit-200 mg-60 unit-2 mg oral tablet  take 1 tablet by oral route QD (OTC)   Vitamin D3 1,000 unit oral capsule  take 1 capsule by oral route daily   zinc oxide topical  using on vaginal area as needed daily         Allergy List  Allergen Name Date Reaction Notes   Ambien --  insomnia --    aspirin --  --  --    Bactrim DS --  --  upset stomach   Boniva --  --  --    Cipro --  --  --    ciprofloxacin --  arrhythmia/lightheadedness/fatigue --    Cymbalta --  upset stomach --    levofloxacin --  syncope/leg pain --    Lyrica --  --  --    metronidazole --  --  --    nitrofurantoin --  --  upset stomach   Pamelor --  back pain/hangover (nortriptyline)   ranitidine HCl --  --  --    tramadol --  hangover --        Allergies Reconciled  Family Medical History  Disease Name Relative/Age Notes   Heart Disease Aunt/  Father/  Son/  Uncle/   --    Diabetes, unspecified type Father/  Son/   --    - No Family History of Colorectal Cancer  --          Reproductive History  Menstrual   Age Menarche: 14   Pregnancy Summary   Total Pregnancies: 2 Full Term: 2 Premature: 0   Ab Induced: 0 Ab Spontaneous: 0 Ectopics: 0   Multiples: 0 Livin         Social History  Finding Status Start/Stop Quantity Notes  "  Active but no formal exercise --  --/-- --  --    Alcohol Never --/-- --  12/11/2020 - 11/11/2020 - 08/12/2020 - 07/10/2020 - 03/02/2020 - 12/04/2019 - 11/13/2019 - 10/30/2019 - 09/30/2019 - 09/18/2019 - 08/01/2019 - 02/23/2018 -     --  --/-- --  --    No known infection risk --  --/-- --  --    Not sexually active --  --/-- --  --    Tobacco Never --/-- --  --          Immunizations  NameDate Admin Mfg Trade Name Lot Number Route Inj VIS Given VIS Publication   Smsnogvai44/11/2020 [facility] Fluzone Quadrivalent [id number] IM LD 11/11/2020 08/15/2019   Comments: PATIENT TOLERATED WELL.   Bbbxkxzcodix10/01/2012 MSD PNEUMOVAX 23  IM NE 03/21/2014 10/06/2009   Comments:    Prevnar 1307/27/2016 [facility] PREVNAR 13 [id number] IM RD 07/27/2016 11/02/2015   Comments: tolerated well   Hirwqnvn13/01/2012 MSD ZOSTAVAX  SC NE 03/21/2014    Comments:          Review of Systems  · Constitutional  o Denies  o : fever, fatigue, weight loss, weight gain  · Cardiovascular  o Denies  o : lower extremity edema, claudication, chest pressure, palpitations  · Respiratory  o Denies  o : shortness of breath, wheezing, cough, hemoptysis, dyspnea on exertion  · Gastrointestinal  o Denies  o : nausea, vomiting, diarrhea, constipation, abdominal pain      Vitals  Date Time BP Position Site L\R Cuff Size HR RR TEMP (F) WT  HT  BMI kg/m2 BSA m2 O2 Sat FR L/min FiO2        12/11/2020 10:54 /90 Sitting    75 - R   109lbs 4oz 5'  2\" 19.98 1.47 97 %            Physical Examination  · Constitutional  o Appearance  o : alert, in no acute distress, well developed, well-nourished  · Neck  o Inspection/Palpation  o : Supple, no masses or tenderness, no deformities, Trachea: Midline, ROM: with in normal limits, no neck stiffness, lymphadenopathy superficial cervical b/l  o Thyroid  o : no thyomegaly, no palpabale masses   · Respiratory  o Auscultation of Lungs  o : normal breath sounds throughout, no wheeze, rhonchi, or " crackles  · Cardiovascular  o Heart  o : Regular rate and rhythm, Normal S1,S2 , No cardiac murmers, No S3 or S4 gallop or rubs  · Skin and Subcutaneous Tissue  o General Inspection  o : no rashes, normal skin color, warm and dry  o Digits and Nails  o : no clubbing, cyanosis, deformities or edema present, normal appearing nails  · Neurologic  o Mental Status Examination  o : alert and oriented to time, place, and person. Gait and Station: normal gait, able to stand without difficulty  · Psychiatric  o Judgement and Insight  o : judgment and insight intact  o Mood and Affect  o : normal mood and affect          Assessment  · Anxiety disorder     300.00/F41.9  · Essential hypertension     401.9/I10  · Fatigue     780.79/R53.83  · Hypothyroidism     244.9/E03.9  · Vitamin D deficiency     268.9/E55.9  · Screening for lipid disorders     V77.91/Z13.220  · Pacemaker     V45.01/Z95.0  · Urethral caruncle     599.3/N36.2  · Back pain     724.5/M54.9  · Macular degeneration     362.50/H35.30  Dharmesh  · Hypertension, essential     401.9/I10  · Difficulty in swallowing     787.20/R13.10  · Vision changes     368.9/H53.9  · Incontinence in female     625.6/R32  · Arthritis     V13.4/V13.4  spine  · Routine lab draw     V72.60/Z01.89  · Vertigo     780.4/R42  · Hemorrhoid     455.6/K64.9      Plan  · Orders  o Physical, Primary Care Panel (CBC, CMP, Lipid, TSH) [facility] (58070, 73519, 85339, 36808) - 401.9/I10, 244.9/E03.9, V77.91/Z13.220, 780.79/R53.83 - 12/11/2020  o Vitamin D (25-Hydroxy) Level (74032) - 268.9/E55.9 - 12/11/2020  o ACO-39: Current medications updated and reviewed (1159F, ) - - 12/11/2020  o ACO-14: Influenza immunization administered or previously received [facility] () - - 12/11/2020  o Vitamin B12 level (01718) - 780.79/R53.83, V72.60/Z01.89 - 12/11/2020  · Medications  o Medications have been Reconciled  o Transition of Care or Provider Policy  · Instructions  o Patient advised to monitor blood  pressure (B/P) at home and journal readings. Patient informed that a B/P reading at home of more than 130/80 is considered hypertension. For readings greater qxhf010/90 or higher patient is advised to follow up in the office with readings for management. Patient advised to limit sodium intake.  o Take all medications as prescribed/directed.  o Patient was educated/instructed on their diagnosis, treatment and medications prior to discharge from the clinic today.  o Patient instructed to seek medical attention urgently for new or worsening symptoms.  o Call the office with any concerns or questions.  o 3 month follow up  o Electronically Identified Patient Education Materials Provided Electronically  · Disposition  o Call or Return if symptoms worsen or persist.            Electronically Signed by: JACKLYN Boateng -Author on December 11, 2020 03:15:02 PM

## 2021-05-13 NOTE — PROGRESS NOTES
Progress Note      Patient Name: Fabiana Herbert   Patient ID: [id number]   Sex: Female   YOB: 1934    Primary Care Provider: Kika VILLASENOR    Visit Date: October 16, 2020    Provider: Pedrito Rios MD   Location: [facility]   Location Address: [address] [place] [address]  [id number]   Location Phone: [phone]          Chief Complaint  · Follow Up Office Visit      History Of Present Illness     Ms. Herbert is doing well following a hemorrhoid banding. We also did a flexible sigmoidoscopy and identified some diverticular disease but otherwise everything looked fine. She is otherwise without any complaints at her rectum. She is not having any pain or bleeding after bowel movements. However, she is continuing to have a lot of cramping after meals. She is going to the bathroom pretty regularly. She takes Miralax daily and also takes stool softeners. Since doing that, she will have at least one bowel movement every day. She sometimes has a couple of bowel movements. She is describing lower abdominal cramping following meals. She has not tried any type of antispasmodic. She has tried Gas-X with some benefit.       Past Medical History  Anemia; Anxiety; Arthritis; Arthritis; Back pain; Bone Density Screening; Cramps, muscle, general; Difficulty in swallowing; Dizziness; Dizziness; Dysuria; EKG, abnormal; Fatigue; Headache; Hearing loss; Hyperlipidemia; Hypertension, essential; Hypothyroidism; Imbalance; Incontinence in female; Ingrown toenail; Insomnia; Iron (Fe) deficiency anemia; Lightheadedness; Low back pain; Lumbar degenerative disc disease; Lumbar/LS disc degeneration; Macular degeneration; Macular degeneration; Mitral Valve Disorders; Mitral valve replaced; Nocturia; Pacemaker; Pain, Joint; Pain, Leg; Pap smear for cervical cancer screening; Peptic ulcer disease; Ringing in ears; Sciatica; Screening Mammogram; Shortness Of Air; Urethral caruncle; Vision changes;  Weakness         Past Surgical History  Appendectomy; Breast biopsy, left breast; Cataract exctraction with lens implant; Colon Surgery; Colonoscopy; Cystoscopy; EGD; Excision of urethral prolapse; Hemorrhoidectomy; Hysterectomy; Lumbar laminectomy; Mitral valve replacement; Pacemaker; Wrist         Medication List  Bystolic 10 mg oral tablet; cefdinir 300 mg oral capsule; Cortizone-10 1 % topical cream; Desitin 13 % topical cream; dicyclomine 20 mg oral tablet; estradiol 0.01 % (0.1 mg/gram) vaginal cream; famotidine 20 mg oral tablet; fluticasone propionate 50 mcg/actuation nasal spray,suspension; hydrochlorothiazide 12.5 mg oral capsule; hydrocortisone 2.5 % topical cream; hydroxyzine HCl 25 mg oral tablet; levothyroxine 50 mcg oral tablet; lidocaine 5 % topical adhesive patch,medicated; lorazepam 0.5 mg oral tablet; losartan 25 mg oral tablet; Miralax 17 gram/dose oral powder; nystatin 100,000 unit/gram topical powder; Pepcid 20 mg oral tablet; prevegen memory vitamin; Probiotic oral; Tylenol oral; Vision Formula (with lutein) 1,000 unit-200 mg-60 unit-2 mg oral tablet; Vitamin D3 1,000 unit oral capsule; zinc oxide topical         Allergy List  Ambien; aspirin; Bactrim DS; Boniva; Cipro; ciprofloxacin; Cymbalta; levofloxacin; Lyrica; metronidazole; nitrofurantoin; Pamelor; ranitidine HCl; tramadol         Family Medical History  Heart Disease; Diabetes, unspecified type; - No Family History of Colorectal Cancer         Reproductive History   2 Para 2 0 0 2       Social History  Active but no formal exercise; Alcohol (Never); ; No known infection risk; Not sexually active; Tobacco (Never)         Review of Systems  · Cardiovascular  o Denies  o : chest pain, irregular heart beats, rapid heart rate, chest pain on exertion, shortness of breath, lower extremity swelling  · Respiratory  o Denies  o : shortness of breath, wheezing, cough, wheezing, chronic cough, coughing up  "blood  · Gastrointestinal  o Denies  o : nausea, vomiting, diarrhea, chronic abdominal pain, reflux symptoms      Vitals  Date Time BP Position Site L\R Cuff Size HR RR TEMP (F) WT  HT  BMI kg/m2 BSA m2 O2 Sat FR L/min FiO2 HC       10/16/2020 09:07 AM       16  110lbs 0oz 5'  2\" 20.12 1.48             Physical Examination     Today on physical exam, she appears well. Her abdomen is soft. It is nontender.           Assessment  · Postoperative Exam Following Surgery     V67.00       IBS-type symptoms. She is doing fine following her hemorrhoid banding. She is not really having any perianal complaints. She is otherwise complaining primarily of IBS-crampy type symptoms.       Plan  · Medications  o Medications have been Reconciled  o Transition of Care or Provider Policy     We are going to go ahead and prescribe some Bentyl for her. I have asked her to take that a couple of times a day. I have asked her to give me a call if she were to have continued problems.             Electronically Signed by: Aliza Dawson-, -Author on October 16, 2020 12:27:24 PM  Electronically Co-signed by: Pedrito Rios MD -Reviewer on October 19, 2020 04:24:55 PM  "

## 2021-05-13 NOTE — PROGRESS NOTES
Progress Note      Patient Name: Fabiana Herbert   Patient ID: [id number]   Sex: Female   YOB: 1934    Primary Care Provider: Kika VILLASENOR   Referring Provider: Emmanuel Serrano DPM    Visit Date: October 19, 2020    Provider: Emmanuel Serrano DPM   Location: [facility]   Location Address: [address] [place] [address]  [id number]   Location Phone: [phone]          Chief Complaint  · Right Foot Pain  · Ingrown Toenail      History Of Present Illness  Fabiana Herbert presents to the office today for evaluation and treatment of      New, Established, New Problem: New problem  Location: Right first medial toenail border(s)  Duration:   Onset: Gradual  Nature: sore, sharp  Stable, worsening, improving: worsening   Aggravating factors: Patient relates pain is aggravated by shoe gear and ambulation.   Previous Treatment: Self debridement    Patient denies any fevers, chills, nausea, vomiting, shortness of breathe, nor any other constitutional signs nor symptoms.          Past Medical History  Anemia; Anxiety; Arthritis; Arthritis; Back pain; Bone Density Screening; Cramps, muscle, general; Difficulty in swallowing; Dizziness; Dizziness; Dysuria; EKG, abnormal; Fatigue; Headache; Hearing loss; Hyperlipidemia; Hypertension, essential; Hypothyroidism; Imbalance; Incontinence in female; Ingrown toenail; Insomnia; Iron (Fe) deficiency anemia; Lightheadedness; Low back pain; Lumbar degenerative disc disease; Lumbar/LS disc degeneration; Macular degeneration; Macular degeneration; Mitral Valve Disorders; Mitral valve replaced; Nocturia; Pacemaker; Pain, Joint; Pain, Leg; Pap smear for cervical cancer screening; Peptic ulcer disease; Ringing in ears; Sciatica; Screening Mammogram; Shortness Of Air; Urethral caruncle; Vision changes; Weakness         Past Surgical History  Appendectomy; Breast biopsy, left breast; Cataract exctraction with lens implant; Colon Surgery; Colonoscopy;  Cystoscopy; EGD; Excision of urethral prolapse; Hemorrhoidectomy; Hysterectomy; Lumbar laminectomy; Mitral valve replacement; Pacemaker; Wrist         Medication List  Bystolic 10 mg oral tablet; Cortizone-10 1 % topical cream; Desitin 13 % topical cream; dicyclomine 20 mg oral tablet; estradiol 0.01 % (0.1 mg/gram) vaginal cream; famotidine 20 mg oral tablet; fluticasone propionate 50 mcg/actuation nasal spray,suspension; hydrochlorothiazide 12.5 mg oral capsule; hydrocortisone 2.5 % topical cream; hydroxyzine HCl 25 mg oral tablet; levothyroxine 50 mcg oral tablet; lidocaine 5 % topical adhesive patch,medicated; lorazepam 0.5 mg oral tablet; losartan 25 mg oral tablet; Miralax 17 gram/dose oral powder; nystatin 100,000 unit/gram topical powder; Pepcid 20 mg oral tablet; prevegen memory vitamin; Probiotic oral; Tylenol oral; Vision Formula (with lutein) 1,000 unit-200 mg-60 unit-2 mg oral tablet; Vitamin D3 1,000 unit oral capsule; zinc oxide topical         Allergy List  Ambien; aspirin; Bactrim DS; Boniva; Cipro; ciprofloxacin; Cymbalta; levofloxacin; Lyrica; metronidazole; nitrofurantoin; Pamelor; ranitidine HCl; tramadol       Allergies Reconciled  Family Medical History  Heart Disease; Diabetes, unspecified type; - No Family History of Colorectal Cancer         Reproductive History   2 Para 2 0 0 2       Social History  Active but no formal exercise; Alcohol (Never); ; No known infection risk; Not sexually active; Tobacco (Never)         Immunizations  Name Date Admin   Influenza 10/03/2019   Influenza 10/24/2018   Influenza 10/25/2017   Influenza 10/10/2016   Influenza 10/20/2015   Pneumococcal 2012   Prevnar 13 2016   Shingles 2012         Review of Systems  · Constitutional  o Admits  o : good general health lately  o Denies  o : fever, chills, additional constitutional symptoms except as noted in the HPI  · Eyes  o Denies  o : double vision  · HENT  o Denies  o : vertigo,  "recent head injury  · Cardiovascular  o Denies  o : chest pain, irregular heart beats  · Respiratory  o Denies  o : shortness of breath  · Gastrointestinal  o Denies  o : nausea, vomiting  · Integument  o * See HPI  · Neurologic  o Denies  o : altered mental status, tingling or numbness  · Musculoskeletal  o Denies  o : joint pain, joint swelling, limitation of motion      Vitals  Date Time BP Position Site L\R Cuff Size HR RR TEMP (F) WT  HT  BMI kg/m2 BSA m2 O2 Sat FR L/min FiO2        10/19/2020 03:30 /75 Sitting    74 - R  97.6 106lbs 0oz 5'  2\" 19.39 1.45 97 %            Physical Examination  · Constitutional  o Appearance  o : well-nourished, well developed, no obvious deformities present, appears to be chronically ill   · Cardiovascular  o Peripheral Vascular System  o :   § Pedal Pulses  § : bilateral pulse strength absent   § Extremities  § : No edema  · Musculoskeletal  o Extremeties/Joint  o : Lower extremity muscle strength and range of motion is equal and symmetrical bilaterally. The knees are noted to be in normal alignment. Ankle alignment and range of motion is normal and foot structure is normal. Subtalar, metatarsal and metatarsal-phalangeal joint range of motion is noted to be within normal limits. The digits of both feet are in normal alignment. The gait is normal.   · Neurologic  o Muskuloskeletal  o :   § RLE  § : Epicritic Sensation intact  § LLE  § : Epicritic Sensation intact  · Toes  o Toes: Right Foot  o :   § Toenails  § : Ingrowing Toenail at 1st Toe, medial side  · Procedures  o Ingrown Toenail  o : Slant back debridement - This procedure is indicated for onychocryptosis. Indications, risks and benefits and alternative treatments have been discussed with this patient who has agreed to this procedure. The area was prepped with isopropyl alcohol solution. The offending nailborder was excised via slant back debridement with tissue nippers. A sterile dressing was applied. The " patient tolerated the procedure well.           Assessment  · Foot pain, right     729.5/M79.671  · Ingrowing nail     703.0/L60.0  · PVD (peripheral vascular disease)     443.9/I73.9      Plan  · Medications  o Medications have been Reconciled  o Transition of Care or Provider Policy  · Instructions  o Patient request PRN follow-up.  o I have discussed the findings of this evaluation with the patient. The discussion included a complete verbal explanation of any changes in the examination results, diagnosis, and the current treatment plan. A schedule for future care needs was explained. If any questions should arise after returning home, I have encouraged the patient to feel free to contact Dr. Serrano. The patient states understanding and agreement with this plan.   o Pt to monitor for problems and to contact Dr. Serrano for follow-up should such signs occur. Patient states understanding and agreement with this plan.   o Electronically Identified Patient Education Materials Provided Electronically  · Disposition  o Call or Return if symptoms worsen or persist.            Electronically Signed by: Emmanuel Serrano DPM -Author on October 19, 2020 09:41:36 PM

## 2021-05-14 VITALS — HEIGHT: 62 IN | WEIGHT: 108 LBS | RESPIRATION RATE: 16 BRPM | BODY MASS INDEX: 19.88 KG/M2

## 2021-05-14 VITALS
WEIGHT: 110.06 LBS | SYSTOLIC BLOOD PRESSURE: 127 MMHG | HEIGHT: 62 IN | HEART RATE: 83 BPM | BODY MASS INDEX: 20.25 KG/M2 | OXYGEN SATURATION: 96 % | TEMPERATURE: 97.8 F | DIASTOLIC BLOOD PRESSURE: 81 MMHG

## 2021-05-14 VITALS
SYSTOLIC BLOOD PRESSURE: 154 MMHG | DIASTOLIC BLOOD PRESSURE: 88 MMHG | HEART RATE: 77 BPM | OXYGEN SATURATION: 98 % | WEIGHT: 110.12 LBS

## 2021-05-14 VITALS — HEIGHT: 62 IN | BODY MASS INDEX: 20.24 KG/M2 | WEIGHT: 110 LBS | TEMPERATURE: 97.7 F

## 2021-05-14 VITALS — WEIGHT: 110 LBS | RESPIRATION RATE: 16 BRPM | HEIGHT: 62 IN | BODY MASS INDEX: 20.24 KG/M2

## 2021-05-14 VITALS
HEIGHT: 62 IN | DIASTOLIC BLOOD PRESSURE: 86 MMHG | OXYGEN SATURATION: 97 % | SYSTOLIC BLOOD PRESSURE: 151 MMHG | BODY MASS INDEX: 20.06 KG/M2 | WEIGHT: 109 LBS | TEMPERATURE: 97.8 F | HEART RATE: 70 BPM

## 2021-05-14 VITALS — HEIGHT: 62 IN | RESPIRATION RATE: 16 BRPM | WEIGHT: 110 LBS | BODY MASS INDEX: 20.24 KG/M2

## 2021-05-14 VITALS
DIASTOLIC BLOOD PRESSURE: 75 MMHG | WEIGHT: 106 LBS | BODY MASS INDEX: 19.51 KG/M2 | OXYGEN SATURATION: 97 % | TEMPERATURE: 97.6 F | SYSTOLIC BLOOD PRESSURE: 140 MMHG | HEIGHT: 62 IN | HEART RATE: 74 BPM

## 2021-05-14 VITALS — HEIGHT: 62 IN | BODY MASS INDEX: 20.13 KG/M2 | WEIGHT: 109.37 LBS | RESPIRATION RATE: 16 BRPM

## 2021-05-14 VITALS
OXYGEN SATURATION: 97 % | SYSTOLIC BLOOD PRESSURE: 138 MMHG | HEART RATE: 75 BPM | WEIGHT: 109.25 LBS | HEIGHT: 62 IN | DIASTOLIC BLOOD PRESSURE: 90 MMHG | BODY MASS INDEX: 20.1 KG/M2

## 2021-05-14 VITALS — HEIGHT: 62 IN | RESPIRATION RATE: 17 BRPM | BODY MASS INDEX: 20.06 KG/M2 | WEIGHT: 109 LBS

## 2021-05-14 VITALS
HEIGHT: 62 IN | WEIGHT: 108.37 LBS | DIASTOLIC BLOOD PRESSURE: 70 MMHG | BODY MASS INDEX: 19.94 KG/M2 | HEART RATE: 77 BPM | OXYGEN SATURATION: 71 % | SYSTOLIC BLOOD PRESSURE: 117 MMHG

## 2021-05-14 VITALS
HEIGHT: 62 IN | BODY MASS INDEX: 19.88 KG/M2 | SYSTOLIC BLOOD PRESSURE: 146 MMHG | DIASTOLIC BLOOD PRESSURE: 80 MMHG | WEIGHT: 108 LBS | HEART RATE: 70 BPM

## 2021-05-14 VITALS
WEIGHT: 110.37 LBS | BODY MASS INDEX: 20.31 KG/M2 | HEIGHT: 62 IN | HEART RATE: 70 BPM | DIASTOLIC BLOOD PRESSURE: 82 MMHG | OXYGEN SATURATION: 94 % | SYSTOLIC BLOOD PRESSURE: 161 MMHG

## 2021-05-14 NOTE — PROGRESS NOTES
"   Progress Note      Patient Name: Fabiana Herbert   Patient ID: [id number]   Sex: Female   YOB: 1934    Primary Care Provider: Kika VILLASENOR   Referring Provider: Kika VILLASENOR    Visit Date: January 12, 2021    Provider: JACKLYN Boateng   Location: [facility]   Location Address: [address] [place] [address], [address]  [place], [place]  [id number]   Location Phone: [phone]          Chief Complaint  · UTI      History Of Present Illness  Fabiana Herbert is a 86 year old /White female who presents for evaluation and treatment of:      Patient is here today w cc of an UTI.    Says she is having burning and urgency with urination for 10 days now. Hx of urethral caruncle-she did have surgery for this issue but continues to c/o burning. States she is using baby cornstarch which has helped the vaginal burning. She has tried the estradiol cream but it made it worse. She was using Zinc Oxide but she can no longer find it states works the best. U/A performed in the office today-WNL. Sent off for culture. Discussed urgency may be r/t OAB-prescribed Mybertriq-samples given in the office today. Reports it has been a yr since she has seen -edgar appt to discuss urinary symptoms.      Also reports hemorrhoids are \"killing her\". Says she do not want to go back to see Dr. Rios he had done it twice and she is still having issues. She has used rectal rockets in the past which helped her symptoms-she would like a refill-refilled today.      Need refill on Lorazepam UDS 05/2020 [place] done in office today. Refilled.       Past Medical History  Disease Name Date Onset Notes   Anemia --  --    Anxiety --  --    Arthritis --  --    Arthritis --  spine   Back pain --  --    Bone Density Screening 07/2015 osteoporosis   Cramps, muscle, general --  --    Difficulty in swallowing --  --    Dizziness --  --    Dizziness --  --    Dysuria 05/24/2019 --    EKG, abnormal 08/12/2016 --  "   Fatigue --  --    Headache --  --    Hearing loss --  --    Hyperlipidemia --  --    Hypertension, essential --  --    Hypothyroidism --  --    Imbalance --  --    Incontinence in female --  --    Ingrown toenail --  --    Insomnia --  --    Iron (Fe) deficiency anemia 02/01/2017 --    Lightheadedness 03/14/2018 The patient reports onset of lightheadedness following heart surgery in 2016. She notes that this tends to be brought on by a change in position. I would consider orthostasis as a potential culprit. I will attempt to pursue orthostatic blood pressures but the patient notes that it is hard for her to lay flat on the exam table. I will request her records be sent for my review.   Low back pain 06/09/2014 --    Lumbar degenerative disc disease 02/01/2017 --    Lumbar/LS disc degeneration 05/04/2014 --    Macular degeneration --  --    Macular degeneration --  Dharmesh   Mitral Valve Disorders --  --    Mitral valve replaced 12/07/2017 --    Nocturia 05/24/2019 --    Pacemaker 02/23/2018 --    Pain, Joint --  --    Pain, Leg --  --    Pap smear for cervical cancer screening 2015 --    Peptic ulcer disease 02/01/2017 --    Ringing in ears --  --    Sciatica 01/09/2015 --    Screening Mammogram --  no longer   Shortness Of Air --  --    Urethral caruncle 05/24/2019 --    Vision changes --  --    Weakness --  --          Past Surgical History  Procedure Name Date Notes   Appendectomy 1951 --    Breast biopsy, left breast 1955 --    Cataract exctraction with lens implant 1992, 2001 left, right   Colon Surgery 1979 18 inches removed diverticulitis   Colonoscopy 2018 Dr. Alonso, Moderate Diverticulosis-2018   Cystoscopy --  2/18/20 Cystoscopy, Excision of Urethral Prolapse w/    EGD 2018 --    Excision of urethral prolapse --  2/18/20 Cystoscopy, Excision of Urethral Prolapse w/    Hemorrhoidectomy 2009/2014 hemorrhoid banding   Hysterectomy 1960's total   Lumbar laminectomy 7/2007 --     Mitral valve replacement 11/17/16  in [place]   Pacemaker November 2016 MRI compatible   Wrist --  left         Medication List  Name Date Started Instructions   Bystolic 10 mg oral tablet 03/30/2020 TAKE 1 TABLET BY MOUTH EVERY NIGHT AT BEDTIME   Cortizone-10 1 % topical cream  apply a thin layer to the affected area(s) by topical route as needed   Desitin 13 % topical cream  apply to affected area(s) by topical route   dicyclomine 20 mg oral tablet 10/16/2020 take 1 tablet (20 mg) by oral route 3 times per day   estradiol 0.01 % (0.1 mg/gram) vaginal cream 11/05/2020 INSERT 1 GRAM VAGINALLY EVERY DAY   famotidine 20 mg oral tablet 08/12/2020 TAKE 1 TABLET(20 MG) BY MOUTH EVERY DAY AT BEDTIME   fluticasone propionate 50 mcg/actuation nasal spray,suspension 11/03/2019 SPRAY 1 - 2 SPRAYS IN EACH NOSTRIL BY INTRANASAL ROUTE ONCE DAILY   hydrochlorothiazide 12.5 mg oral capsule 05/18/2020 TAKE ONE CAPSULE BY MOUTH EVERY DAY   hydrocortisone 2.5 % topical cream 08/07/2020 apply a thin layer to the affected area(s) by topical route 2 times per day for 14 days   hydroxyzine HCl 25 mg oral tablet 07/15/2019 one po BID PRN   levothyroxine 50 mcg oral tablet 04/30/2020 TAKE 1 TABLET(50 MCG) BY MOUTH EVERY DAY   lidocaine 5 % topical adhesive patch,medicated 03/25/2020 APPLY 2 PATCHES EXTERNALLY TO THE SKIN EVERY DAY. MAY WEAR UP TO 12 HOURS   lorazepam 0.5 mg oral tablet 12/11/2020 take 1 tablet (0.5 mg) by oral route once daily as needed for 30 days   losartan 25 mg oral tablet 09/14/2020 TAKE 1 TABLET BY MOUTH EVERY DAY   Miralax 17 gram/dose oral powder  take 17 gram mixed with 8 oz. water, juice, soda, coffee or tea by oral route once daily   nystatin 100,000 unit/gram topical powder 09/18/2019 apply to the affected area(s) by topical route 2 times per day   Pepcid 20 mg oral tablet 08/12/2020 take 1 tablet (20 mg) by oral route once daily at bedtime   Vision Formula (with lutein) 1,000 unit-200 mg-60  unit-2 mg oral tablet  take 1 tablet by oral route QD (OTC)   Vitamin D3 1,000 unit oral capsule  take 1 capsule by oral route daily         Allergy List  Allergen Name Date Reaction Notes   Ambien --  insomnia --    aspirin --  --  --    Bactrim DS --  --  upset stomach   Boniva --  --  --    Cipro --  --  --    ciprofloxacin --  arrhythmia/lightheadedness/fatigue --    Cymbalta --  upset stomach --    levofloxacin --  syncope/leg pain --    Lyrica --  --  --    metronidazole --  --  --    nitrofurantoin --  --  upset stomach   Pamelor --  back pain/hangover (nortriptyline)   ranitidine HCl --  --  --    tramadol --  hangover --        Allergies Reconciled  Family Medical History  Disease Name Relative/Age Notes   - No Family History of Colorectal Cancer  --    Family history of heart disease Aunt/  Father/  Sister/  Uncle/   --    Family history of diabetes mellitus Father/  Sister/   --          Reproductive History  Menstrual   Age Menarche: 14   Pregnancy Summary   Total Pregnancies: 2 Full Term: 2 Premature: 0   Ab Induced: 0 Ab Spontaneous: 0 Ectopics: 0   Multiples: 0 Livin         Social History  Finding Status Start/Stop Quantity Notes   Active but no formal exercise --  --/-- --  --    Alcohol Never --/-- --  2021 - 2020 - 2020 - 2020 - 07/10/2020 - 2020 - 2019 - 2019 - 10/30/2019 - 2019 - 2019 - 2019 - 2018 -     --  --/-- --  --    No known infection risk --  --/-- --  --    Not sexually active --  --/-- --  --    Tobacco Never --/-- --  --          Immunizations  NameDate Admin Mfg Trade Name Lot Number Route Inj VIS Given VIS Publication   Pykrnxduu32/11/2020 PMC Fluzone Quadrivalent [id number] IM LD 2020 08/15/2019   Comments: PATIENT TOLERATED WELL.   Usftshmtxbqc48/01/2012 MSD PNEUMOVAX 23  IM NE 2014 10/06/2009   Comments:    Prevnar [id number] WAL PREVNAR 13 [id number]  RD 2016   Comments:  "tolerated well   Yjvbqtnk30/01/2012 MSD ZOSTAVAX  SC NE 03/21/2014    Comments:          Review of Systems  · Constitutional  o Denies  o : fever, fatigue, weight loss, weight gain  · Cardiovascular  o Denies  o : lower extremity edema, claudication, chest pressure, palpitations  · Respiratory  o Denies  o : shortness of breath, wheezing, cough, hemoptysis, dyspnea on exertion  · Gastrointestinal  o Admits  o : hemorrhoids  o Denies  o : nausea, vomiting, diarrhea, constipation, abdominal pain  · Genitourinary  o Admits  o : urgency, frequency, dysuria  · Psychiatric  o Admits  o : anxiety      Vitals  Date Time BP Position Site L\R Cuff Size HR RR TEMP (F) WT  HT  BMI kg/m2 BSA m2 O2 Sat FR L/min FiO2 HC       01/12/2021 03:27 /82 Sitting    70 - R   110lbs 6oz 5'  2\" 20.19 1.48 94 %            Physical Examination  · Constitutional  o Appearance  o : alert, in no acute distress, well developed, well-nourished  · Neck  o Thyroid  o : no thyomegaly, no palpabale masses   · Respiratory  o Auscultation of Lungs  o : normal breath sounds throughout, no wheeze, rhonchi, or crackles  · Cardiovascular  o Heart  o : Regular rate and rhythm, Normal S1,S2 , No cardiac murmers, No S3 or S4 gallop or rubs  · Skin and Subcutaneous Tissue  o General Inspection  o : no rashes, normal skin color, warm and dry  o Digits and Nails  o : no clubbing, cyanosis, deformities or edema present, normal appearing nails  · Neurologic  o Mental Status Examination  o : alert and oriented to time, place, and person. Gait and Station: normal gait, able to stand without difficulty  · Psychiatric  o Judgement and Insight  o : judgment and insight intact  o Mood and Affect  o : normal mood and affect          Results  · In-Office Procedures  o Lab procedure  § IOP - Urinalysis without Microscopy (Clinitek) [facility] ([id number])   § Color Ur: Yellow   § Clarity Ur: Clear   § Glucose Ur Ql Strip: Negative   § Bilirub Ur Ql Strip: Negative   § Ketones Ur " Ql Strip: Negative   § Sp Gr Ur Qn: 1.025   § Hgb Ur Ql Strip: Negative   § pH Ur-LsCnc: 5.5   § Prot Ur Ql Strip: Negative   § Urobilinogen Ur Strip-mCnc: 0.2 E.U./dL   § Nitrite Ur Ql Strip: Negative   § WBC Est Ur Ql Strip: Negative       Assessment  · Anxiety disorder     300.00/F41.9  · Dysuria     788.1/R30.0  · Frequency of urination     788.41/R35.0  · Burning with urination     788.1/R30.0  · Hemorrhoids     455.6/K64.9      Plan  · Orders  o YOSVANY Report (KASPR) - - 01/12/2021  o ACO-14: Influenza immunization administered or previously received [facility] () - - 01/12/2021  o ACO-39: Current medications updated and reviewed (1159F, ) - - 01/12/2021  o Urine Culture (Clean Catch) [facility] ([id number]) - - 01/12/2021  o UROLOGY CONSULTATION (UROLO) - 788.41/R35.0, 788.1/R30.0 - 01/12/2021  · Medications  o rectal rockets 2.5 %-2%   SIG: rectal rockets 2.5-2% suppository-use one per day prn   DISP: (2) Suppository with 2 refills  Prescribed on 01/12/2021     o Myrbetriq 25 mg oral tablet extended release 24 hr   SIG: take 1 tablet (25 mg) by oral route once daily swallowing whole with water. Do not crush, chew and/or divide.   DISP: (28) Tablet with 0 refills  Prescribed on 01/14/2021     o lorazepam 0.5 mg oral tablet   SIG: take 1 tablet (0.5 mg) by oral route once daily as needed for 30 days   DISP: (30) Tablet with 0 refills  Refilled on 01/14/2021     o Medications have been Reconciled  o Transition of Care or Provider Policy  · Instructions  o Take all medications as prescribed/directed.  o Patient was educated/instructed on their diagnosis, treatment and medications prior to discharge from the clinic today.  o Patient instructed to seek medical attention urgently for new or worsening symptoms.  o Call the office with any concerns or questions.  o follow up as scheduled  o Electronically Identified Patient Education Materials Provided Electronically  · Disposition  o Call or Return if symptoms worsen or  persist.            Electronically Signed by: JACKLYN Boateng -Author on January 14, 2021 09:32:44 AM

## 2021-05-14 NOTE — PROGRESS NOTES
Progress Note      Patient Name: Fabiana Herbert   Patient ID: [id number]   Sex: Female   YOB: 1934    Primary Care Provider: Kika VILLASENOR   Referring Provider: Kika VILLASENOR    Visit Date: January 22, 2021    Provider: JACKLYN Carpio   Location: [facility]   Location Address: [address] [place] [address]  [id number]   Location Phone: [phone]          Chief Complaint  · pt here for urological concerns  · pt here for urologic issues      History Of Present Illness  The patient presents for follow-up for a urethral caruncle, urinary incontinence and dysuria; reports that the dysuria has worsened at this point in time..        She continues to have burning and she states it burns constantly worse after she urinates but is constantly present.  It is better with Vaseline and corn starch.     Stopped using the vaginal Estrace cream.    The patient reports that she was started on Myrbetriq by her primary care provider however she took it for exactly 1 dose and had diarrhea approximately 6 hours later and states that she believes it is related to the medication so she never took it again.    Patient had a excision of her urethral polyp done in the operating room in 2020.  She is emptying well.     The patient about 20 oz of water a day. She drinks decaf coffee she has cut out fruit and tomato juices as well.    She states she gets up several times a night on some days.    She c/o urinary leakage nearly all day.       Past Medical History  Anemia; Anxiety; Arthritis; Arthritis; Back pain; Bone Density Screening; Cramps, muscle, general; Difficulty in swallowing; Dizziness; Dizziness; Dysuria; EKG, abnormal; Fatigue; Headache; Hearing loss; Hyperlipidemia; Hypertension, essential; Hypothyroidism; Imbalance; Incontinence in female; Ingrown toenail; Insomnia; Iron (Fe) deficiency anemia; Lightheadedness; Low back pain; Lumbar degenerative disc disease; Lumbar/LS disc  degeneration; Macular degeneration; Macular degeneration; Mitral Valve Disorders; Mitral valve replaced; Nocturia; Pacemaker; Pain, Joint; Pain, Leg; Pap smear for cervical cancer screening; Peptic ulcer disease; Ringing in ears; Sciatica; Screening Mammogram; Shortness Of Air; Urethral caruncle; Vision changes; Weakness         Past Surgical History  Appendectomy; Breast biopsy, left breast; Cataract exctraction with lens implant; Colon Surgery; Colonoscopy; Cystoscopy; EGD; Excision of urethral prolapse; Hemorrhoidectomy; Hysterectomy; Lumbar laminectomy; Mitral valve replacement; Pacemaker; Wrist         Medication List  Bystolic 10 mg oral tablet; Cortizone-10 1 % topical cream; Desitin 13 % topical cream; dicyclomine 20 mg oral tablet; esomeprazole magnesium 20 mg oral capsule,delayed release(DR/EC); estradiol 0.01 % (0.1 mg/gram) vaginal cream; famotidine 20 mg oral tablet; fluticasone propionate 50 mcg/actuation nasal spray,suspension; hydrochlorothiazide 12.5 mg oral capsule; hydrocortisone 2.5 % topical cream; hydroxyzine HCl 25 mg oral tablet; levothyroxine 50 mcg oral tablet; lidocaine 5 % topical adhesive patch,medicated; lorazepam 0.5 mg oral tablet; losartan 25 mg oral tablet; Miralax 17 gram/dose oral powder; nystatin 100,000 unit/gram topical powder; Pepcid 20 mg oral tablet; rectal rockets 2.5 %-2%; Vision Formula (with lutein) 1,000 unit-200 mg-60 unit-2 mg oral tablet; Vitamin D3 1,000 unit oral capsule         Allergy List  Ambien; aspirin; Bactrim DS; Boniva; Cipro; ciprofloxacin; Cymbalta; levofloxacin; Lyrica; metronidazole; nitrofurantoin; Pamelor; ranitidine HCl; tramadol         Family Medical History  - No Family History of Colorectal Cancer; Family history of heart disease; Family history of diabetes mellitus         Reproductive History   2 Para 2 0 0 2       Social History  Active but no formal exercise; Alcohol (Never); ; No known infection risk; Not sexually active;  "Tobacco (Never)         Immunizations  Name Date Admin   Influenza 11/11/2020   Influenza 10/03/2019   Influenza 10/24/2018   Influenza 10/25/2017   Influenza 10/10/2016   Influenza 10/20/2015   Pneumococcal 11/01/2012   Prevnar 13 07/27/2016   Shingles 11/01/2012         Review of Systems  · Constitutional  o Denies  o : fatigue, fever  · Cardiovascular  o Denies  o : chest pain, heart disease, heart attack  · Respiratory  o Denies  o : lung disease, shortness of breath, asthma  · Gastrointestinal  o Denies  o : stomach or bowel disease, blood in stools, ulcers  · Genitourinary  o Admits  o : frequent urination , urinary leakage, painful urination  o Denies  o : kidney or bladder infections, urgency or urination, difficulty voiding, interrupted stream, voiding at night, straining to urinate, blood in urine  · Neurologic  o Denies  o : neurologic disease  · Musculoskeletal  o Denies  o : swelling or pain in your lower extremities      Vitals  Date Time BP Position Site L\R Cuff Size HR RR TEMP (F) WT  HT  BMI kg/m2 BSA m2 O2 Sat FR L/min FiO2 HC       01/22/2021 08:26 AM       16  109lbs 6oz 5'  2\" 20 1.47             Physical Examination  · Ears, Nose, Mouth and Throat  o Ears  o :   § External Ears  § : appearance within normal limits, no lesions present  o Nose  o :   § External Nose  § : appearance normal  · Respiratory  o Inspection of Chest  o : normal appearance, no retractions  · Genitourinary  o External Genitalia  o : no inflammation, no lesions present  o Vagina  o : Vaginal atrophy is noted.  o Urethra  o : Urethral carbuncle well healed with no inflammation or redness. Her incision is well healed. It is not ulcerated and the erythema is gone.   · Musculoskeletal  o Pelvis  o : no pelvic bony or muscular tenderness  o Ribs  o : no deformities or tenderness to palpation present, costochondral junctions nontender to palpation  · Neurologic  o Mental Status Examination  o :   § Speech/Language  § : " communication ability within normal limits          Results  · In-Office Procedures  o Lab procedure  § Automated dipstick urinalysis with microscopy (61282)   § Color Ur: Yellow   § Clarity Ur: Clear   § Glucose Ur Ql Strip: Negative   § Bilirub Ur Ql Strip: Negative   § Ketones Ur Ql Strip: Negative   § Sp Gr Ur Qn: 1.020   § Hgb Ur Ql Strip: Negative   § pH Ur-LsCnc: 6.5   § Prot Ur Ql Strip: Trace   § Urobilinogen Ur Strip-mCnc: 0.2   § Nitrite Ur Ql Strip: Negative   § WBC Est Ur Ql Strip: Negative   § RBC UrnS Qn HPF: 0   § WBC UrnS Qn HPF: 0   § Bacteria UrnS Qn HPF: 0   § Crystals UrnS Qn HPF: 0   § Epithelial Cells (non renal): 0 /HPF  § Epithelial Cells (renal): 0   o Surgical procedure  § IOP - Bladder Scan/Residual Urine (79871)   § Specimen vol Ur: 0       Assessment  · Urethral caruncle     599.3/N36.2  · Dysuria     788.1/R30.0  · Urethral caruncle     599.3/N36.2  · Dysuria     788.1/R30.0  · Nocturia     788.43/R35.1  · Urinary frequency     788.41/R35.0  · Vaginal itching     698.1/N89.8  · Malodorous urine     791.9/R82.90  · Candidiasis     112.9/B37.9  · Cystitis     595.9/N30.90  · Hemorrhoids, complicated     455.8/K64.8      Plan  · Medications  o Medications have been Reconciled  o Transition of Care or Provider Policy  · Instructions  o Discussed with patient that she will need to continue to increase her water intake as she is still only drinking about 28 ounces of water a day.Discussed with patient that we could increase the use of her vaginal Estrace cream once daily for a month and then wean back to the 3 times a week nightly after that point in time.Discussed with the patient that her vaginal exam is fairly consistent with severe atrophic vaginitis and that her urethral carbuncle actually looks significantly better than it did previously.The patient's insurance would not pay for Pyridium discussed using AZO maximum strength once a day for 7 days to see if this will alleviate some of  her dysuria.  o She will continue her Estrace Cream and use nightly 3x a week. She will increase her fluid intake and decrease the acidic drinks in her diet. we discussed double voiding to ensure she is emptying her bladder fully.   o Plan Of Care: f/u with patient in office in 6 weeks   o Electronically Identified Patient Education Materials Provided Electronically            Electronically Signed by: JACKLYN Carpio -Author on January 22, 2021 03:30:35 PM

## 2021-05-14 NOTE — PROGRESS NOTES
Progress Note      Patient Name: Fabiana Herbert   Patient ID: [id number]   Sex: Female   YOB: 1934    Primary Care Provider: Kika VILLASENOR   Referring Provider: Kika VILLASENOR    Visit Date: March 12, 2021    Provider: JACKLYN Boateng   Location: [facility]   Location Address: [address] [place] [address], [address]  [place], [place]  [id number]   Location Phone: [phone]          Chief Complaint  · 3 month f/u      History Of Present Illness  Fabiana Herbert is a 86 year old /White female who presents for evaluation and treatment of:      Pt is here for 3 month f/u.      Pt states that the Zofran helps with the nausea but she still having issues with her stomach especially when she eats a light meal. Her stomach hurts and swells up on the left side first than to the right side. States it is not happening on a daily basis.    Pt states she having sinus drainage with some blood.     Pt states that BM are black stools. Admits it has been going on 2-3 months. States she takes mirilax everyday otherwise she cannot have a BM. States she is still having trouble with hemorrhoids-states in the evening she feels like she needs to have another bm but cant sometimes she has to take a laxative-states Dr. Rios does not want her to use the laxatives due to becoming habit forming. She had banding done x 2. Last colonscopy 2018-showed diverticulosis. Hx of diverticulitis w/18 inches of the bowel removed. Admits Right and Left lower quad pain-on palpation it is more the LLQ. Ordered stat CT abd/pelvis, CBC and occult stools and GI consult.     Pt has keeping up with her BP log twice a day in the morning and at night. Reviewed with patient -and it is excellent.     Pt had COVID 1st 2/12/21 soreness in arm for two days and getting second vaccine today.    admits numbess of the tongue and chin at times-ordered vit b12 and vit d.    She saw  to discuss her thyroid U/S -states  the thyroid looked small but otherwise normal.       Pt states that she has appt with Dr. Anthony on 4/17/21. States last visit she was told she may have another issue with her valve so she is going to discuss this at her visit. She is also going to have her pacemaker checked. Admits occ palpitations.     she saw  this wk for ingrown toenails.    YOSVANY 1/12/21  UDS 5/4/2020       Past Medical History  Disease Name Date Onset Notes   Anemia --  --    Anxiety --  --    Arthritis --  --    Arthritis --  spine   Back pain --  --    Bone Density Screening 07/2015 osteoporosis   Cramps, muscle, general --  --    Difficulty in swallowing --  --    Dizziness --  --    Dizziness --  --    Dysuria 05/24/2019 --    EKG, abnormal 08/12/2016 --    Fatigue --  --    Headache --  --    Hearing loss --  --    Hyperlipidemia --  --    Hypertension, essential --  --    Hypothyroidism --  --    Imbalance --  --    Incontinence in female --  --    Ingrown toenail --  --    Insomnia --  --    Iron (Fe) deficiency anemia 02/01/2017 --    Lightheadedness 03/14/2018 The patient reports onset of lightheadedness following heart surgery in 2016. She notes that this tends to be brought on by a change in position. I would consider orthostasis as a potential culprit. I will attempt to pursue orthostatic blood pressures but the patient notes that it is hard for her to lay flat on the exam table. I will request her records be sent for my review.   Low back pain 06/09/2014 --    Lumbar degenerative disc disease 02/01/2017 --    Lumbar/LS disc degeneration 05/04/2014 --    Macular degeneration --  --    Macular degeneration --  Dharmesh   Mitral Valve Disorders --  --    Mitral valve replaced 12/07/2017 --    Nocturia 05/24/2019 --    Pacemaker 02/23/2018 --    Pain, Joint --  --    Pain, Leg --  --    Pap smear for cervical cancer screening 2015 --    Peptic ulcer disease 02/01/2017 --    Ringing in ears --  --    Sciatica  01/09/2015 --    Screening Mammogram --  no longer   Shortness Of Air --  --    Urethral caruncle 05/24/2019 --    Vision changes --  --    Weakness --  --          Past Surgical History  Procedure Name Date Notes   Appendectomy 1951 --    Breast biopsy, left breast 1955 --    Cataract exctraction with lens implant 1992, 2001 left, right   Colon Surgery 1979 18 inches removed diverticulitis   Colonoscopy 2018 Dr. Alonso, Moderate Diverticulosis-2018   Cystoscopy --  2/18/20 Cystoscopy, Excision of Urethral Prolapse w/    EGD 2018 --    Excision of urethral prolapse --  2/18/20 Cystoscopy, Excision of Urethral Prolapse w/    Hemorrhoidectomy 2009/2014 hemorrhoid banding   Hysterectomy 1960's total   Lumbar laminectomy 7/2007 --    Mitral valve replacement 11/17/16  in [facility] November 2016 MRI compatible   Wrist --  left         Medication List  Name Date Started Instructions   Bystolic 10 mg oral tablet 03/30/2020 TAKE 1 TABLET BY MOUTH EVERY NIGHT AT BEDTIME   Cortizone-10 1 % topical cream  apply a thin layer to the affected area(s) by topical route as needed   Desitin 13 % topical cream  apply to affected area(s) by topical route   dicyclomine 20 mg oral tablet 10/16/2020 take 1 tablet (20 mg) by oral route 3 times per day   esomeprazole magnesium 20 mg oral capsule,delayed release(/EC) 03/12/2021 take 1 capsule (20 mg) by oral route once daily at least 1 hour before a meal swallowing whole. Do not crush or chew granules. for 90 days   estradiol 0.01 % (0.1 mg/gram) vaginal cream 11/05/2020 INSERT 1 GRAM VAGINALLY EVERY DAY   famotidine 20 mg oral tablet 08/12/2020 TAKE 1 TABLET(20 MG) BY MOUTH EVERY DAY AT BEDTIME   fluticasone propionate 50 mcg/actuation nasal spray,suspension 11/03/2019 SPRAY 1 - 2 SPRAYS IN EACH NOSTRIL BY INTRANASAL ROUTE ONCE DAILY   hydrochlorothiazide 12.5 mg oral capsule 05/18/2020 TAKE ONE CAPSULE BY MOUTH EVERY DAY   hydrocortisone 2.5 %  topical cream 08/07/2020 apply a thin layer to the affected area(s) by topical route 2 times per day for 14 days   hydroxyzine HCl 25 mg oral tablet 07/15/2019 one po BID PRN   levothyroxine 50 mcg oral tablet 04/30/2020 TAKE 1 TABLET(50 MCG) BY MOUTH EVERY DAY   lidocaine 5 % topical adhesive patch,medicated 02/04/2021 APPLY 2 PATCHES EXTERNALLY TO THE SKIN EVERY DAY. MAY WEAR UP TO 12 HOURS   lorazepam 0.5 mg oral tablet 02/12/2021 take 1 tablet (0.5 mg) by oral route once daily as needed for 30 days   losartan 25 mg oral tablet 09/14/2020 TAKE 1 TABLET BY MOUTH EVERY DAY   Miralax 17 gram/dose oral powder  take 17 gram mixed with 8 oz. water, juice, soda, coffee or tea by oral route once daily   nystatin 100,000 unit/gram topical powder 09/18/2019 apply to the affected area(s) by topical route 2 times per day   rectal rockets 2.5 %-2% 01/12/2021 rectal rockets 2.5-2% suppository-use one per day prn   Vision Formula (with lutein) 1,000 unit-200 mg-60 unit-2 mg oral tablet  take 1 tablet by oral route QD (OTC)   Vitamin D3 1,000 unit oral capsule  take 1 capsule by oral route daily   Zofran 4 mg oral tablet 03/12/2021 take 1 tablet (4 mg) by oral route 3 times per day as needed for nausea         Allergy List  Allergen Name Date Reaction Notes   Ambien --  insomnia --    aspirin --  --  --    Bactrim DS --  --  upset stomach   Boniva --  --  --    Cipro --  --  --    ciprofloxacin --  arrhythmia/lightheadedness/fatigue --    Cymbalta --  upset stomach --    levofloxacin --  syncope/leg pain --    Lyrica --  --  --    metronidazole --  --  --    nitrofurantoin --  --  upset stomach   Pamelor --  back pain/hangover (nortriptyline)   ranitidine HCl --  --  --    tramadol --  hangover --        Allergies Reconciled  Family Medical History  Disease Name Relative/Age Notes   - No Family History of Colorectal Cancer  --    Family history of heart disease Aunt/  Father/  Sister/  Uncle/   --    Family history of diabetes  mellitus Father/  Sister/   --          Reproductive History  Menstrual   Age Menarche: 14   Pregnancy Summary   Total Pregnancies: 2 Full Term: 2 Premature: 0   Ab Induced: 0 Ab Spontaneous: 0 Ectopics: 0   Multiples: 0 Livin         Social History  Finding Status Start/Stop Quantity Notes   Active but no formal exercise --  --/-- --  --    Alcohol Never --/-- --  2021 - 2020 - 2020 - 2020 - 07/10/2020 - 2020 - 2019 - 2019 - 10/30/2019 - 2019 - 2019 - 2019 - 2018 -     --  --/-- --  --    No known infection risk --  --/-- --  --    Not sexually active --  --/-- --  --    Tobacco Never --/-- --  --          Immunizations  NameDate Admin Mfg Trade Name Lot Number Route Inj VIS Given VIS Publication   Cyhvoskug13/11/2020 [facility] Fluzone Quadrivalent [id number] IM LD 2020 08/15/2019   Comments: PATIENT TOLERATED WELL.   Bhhoigmvqcky15/01/2012 MSD PNEUMOVAX 23  IM NE 2014 10/06/2009   Comments:    Prevnar [id number] [facility] PREVNAR 13 [id number] IM RD 2016   Comments: tolerated well   Heeyjoxj59/01/2012 MSD ZOSTAVAX  SC NE 2014    Comments:          Review of Systems  · Constitutional  o Denies  o : fatigue, night sweats  · Eyes  o Denies  o : double vision, blurred vision  · HENT  o Denies  o : vertigo, recent head injury  · Breasts  o Denies  o : abnormal changes in breast size, additional breast symptoms except as noted in the HPI  · Cardiovascular  o Admits  o : occ palpitations  o Denies  o : chest pain, irregular heart beats  · Respiratory  o Denies  o : shortness of breath, productive cough  · Gastrointestinal  o Admits  o : nausea, constipation, dysphagia, heartburn, reflux, abdominal pain, blood in stools, hemorrhoids, bloating  o Denies  o : vomiting  · Genitourinary  o Denies  o : dysuria, urinary retention  · Integument  o Denies  o : hair growth change, new skin lesions  · Neurologic  o Admits  o : tingling  "or numbness  o Denies  o : altered mental status, seizures  · Musculoskeletal  o Denies  o : joint swelling, limitation of motion  · Endocrine  o Denies  o : cold intolerance, heat intolerance  · Heme-Lymph  o Denies  o : petechiae, lymph node enlargement or tenderness  · Allergic-Immunologic  o Denies  o : frequent illnesses      Vitals  Date Time BP Position Site L\R Cuff Size HR RR TEMP (F) WT  HT  BMI kg/m2 BSA m2 O2 Sat FR L/min FiO2 HC       03/09/2021 01:26 /86 Sitting    70 - R  97.8 109lbs 0oz 5'  2\" 19.94 1.47 97 %      03/12/2021 10:23 /70 Sitting    77 - R   108lbs 6oz 5'  2\" 19.82 1.47 71 %  21%          Physical Examination  · Constitutional  o Appearance  o : alert, in no acute distress, well developed, well-nourished  · Neck  o Thyroid  o : no thyomegaly, no palpabale masses   · Respiratory  o Auscultation of Lungs  o : normal breath sounds throughout, no wheeze, rhonchi, or crackles  · Cardiovascular  o Heart  o : Regular rate and rhythm, Normal S1,S2 , No cardiac murmers, No S3 or S4 gallop or rubs  · Gastrointestinal  o Abdominal Examination  o : TTP over the LLQ and LLQ mostly LLQ  · Skin and Subcutaneous Tissue  o General Inspection  o : no rashes, normal skin color, warm and dry  o Digits and Nails  o : no clubbing, cyanosis, deformities or edema present, normal appearing nails  · Neurologic  o Mental Status Examination  o : alert and oriented to time, place, and person. Gait and Station: normal gait, able to stand without difficulty  · Psychiatric  o Judgment and Insight  o : judgment and insight intact  o Mood and Affect  o : normal mood and affect          Assessment  · Screening for colon cancer     V76.51/Z12.11  · Essential hypertension     401.9/I10  · GERD (gastroesophageal reflux disease)     530.81/K21.9  · LLQ pain     789.04/R10.32  · History of diverticulitis of colon     V12.79/Z87.19  · History of colon resection     V45.89/Z90.49  · Blood in " stool     578.1/K92.1  · Numbness and tingling       Anesthesia of skin     782.0/R20.0  Paresthesia of skin     782.0/R20.2  · Nausea     787.02/R11.0      Plan  · Orders  o COLONOSCOPY REFERRAL (COLON) - V76.51/Z12.11 - 03/12/2021     o ACO - Pt declines to or was not able to provide an Advance Care Plan or name a Surrogate Decision Maker (1124F) - - 03/12/2021  o CBC with Auto Diff [facility] (36872) - 578.1/K92.1 - 03/12/2021  o CMP [facility] (05307) - 401.9/I10 - 03/12/2021  o Vitamin D (25-Hydroxy) Level (95356) - 782.0/R20.0, 782.0/R20.2 - 03/12/2021  o ACO-39: Current medications updated and reviewed (, 1159F) - - 03/12/2021  o Occult blood, fecal, guaiac, 1-3 samples MEDICARE SCREENING [facility] () - 789.04/R10.32, V12.79/Z87.19, V45.89/Z90.49, 578.1/K92.1 - 03/12/2021  o Vitamin B12 level (77161) - 782.0/R20.0, 782.0/R20.2 - 03/12/2021  o CT Abdomen and Pelvis with IV Contrast [facility]; suggest Oral Prep (69211) - 789.04/R10.32, V12.79/Z87.19, V45.89/Z90.49, 578.1/K92.1 - 03/12/2021  · Medications  o esomeprazole magnesium 20 mg oral capsule,delayed release(DR/EC)   SIG: take 1 capsule (20 mg) by oral route once daily at least 1 hour before a meal swallowing whole. Do not crush or chew granules. for 90 days   DISP: (90) Capsule with 1 refills  Adjusted on 03/12/2021     o lorazepam 0.5 mg oral tablet   SIG: take 1 tablet (0.5 mg) by oral route once daily as needed for 90 days   DISP: (90) Tablet with 0 refills  Adjusted on 03/12/2021     o Zofran 4 mg oral tablet   SIG: take 1 tablet (4 mg) by oral route 3 times per day as needed for nausea   DISP: (30) Tablet with 2 refills  Adjusted on 03/12/2021     o Pepcid 20 mg oral tablet   SIG: take 1 tablet (20 mg) by oral route once daily at bedtime   DISP: (30) tablets with 0 refills  Discontinued on 03/12/2021     o Medications have been Reconciled  o Transition of Care or Provider Policy  · Instructions  o Patient advised to monitor blood pressure (B/P) at home  and journal readings. Patient informed that a B/P reading at home of more than 130/80 is considered hypertension. For readings greater ivyn242/90 or higher patient is advised to follow up in the office with readings for management. Patient advised to limit sodium intake.  o Maintain a healthy weight. Avoid tight fitting clothes. Avoid fried, fatty foods, tomato sauce, chocolate, mint, garlic, onion, alcohol. caffeine. Eat smaller meals, dont lie down after a meal, dont smoke. Elevate the head of your bed 6-9 inches.  o Take all medications as prescribed/directed.  o Patient was educated/instructed on their diagnosis, treatment and medications prior to discharge from the clinic today.  o Patient instructed to seek medical attention urgently for new or worsening symptoms.  o Call the office with any concerns or questions.  o Bring all medicines with their bottles to each office visit.  o Electronically Identified Patient Education Materials Provided Electronically  · Disposition  o Call or Return if symptoms worsen or persist.            Electronically Signed by: JACKLYN Boateng -Author on March 12, 2021 11:36:44 AM

## 2021-05-14 NOTE — PROGRESS NOTES
Progress Note      Patient Name: Fabiana Herbert   Patient ID: [id number]   Sex: Female   YOB: 1934    Primary Care Provider: Kika VILLASENOR   Referring Provider: Kika VILLASENOR    Visit Date: December 31, 2020    Provider: Emerson Worley MD   [facility] [facility]   Location Address: [address] [place] [address], [address]  [id number]   Location Phone: [phone]          Chief Complaint     1.  Globus sensation / GERD    2.  Submandibular gland concern, left       History Of Present Illness  Fabiana Herbert is a 86 year old /White female who presents to the office today for an established patient visit.      She presents the clinic today accompanied by her daughter for evaluation of hypothyroidism, globus sensation, GERD, and concerned about her left submandibular gland.  She notes that she does tend to eat meals throughout the day and eats at night when she cannot sleep.  She is on 2 antacid medications for GERD.  Last several months she has noted a slight discomfort along the lower part of her neck along her thyroid gland.  An ultrasound of her thyroid was performed and shows a small normal thyroid gland.  She is on 50 mcg of Synthroid for hypothyroidism.    She is also has some concerns about discomfort along her left submandibular gland.  Her primary care physician obtained a CT scan which shows a normal submandibular gland without any masses.  Personally reviewed the images today. She denies any swelling or pain, but notes that it is just occasionally uncomfortable.  She is well hydrated and denies any foul taste in her mouth.       Past Medical History  Anemia; Anxiety; Arthritis; Arthritis; Back pain; Bone Density Screening; Cramps, muscle, general; Difficulty in swallowing; Dizziness; Dizziness; Dysuria; EKG, abnormal; Fatigue; Headache; Hearing loss; Hyperlipidemia; Hypertension, essential; Hypothyroidism; Imbalance; Incontinence in female; Ingrown  toenail; Insomnia; Iron (Fe) deficiency anemia; Lightheadedness; Low back pain; Lumbar degenerative disc disease; Lumbar/LS disc degeneration; Macular degeneration; Macular degeneration; Mitral Valve Disorders; Mitral valve replaced; Nocturia; Pacemaker; Pain, Joint; Pain, Leg; Pap smear for cervical cancer screening; Peptic ulcer disease; Ringing in ears; Sciatica; Screening Mammogram; Shortness Of Air; Urethral caruncle; Vision changes; Weakness         Past Surgical History  Appendectomy; Breast biopsy, left breast; Cataract exctraction with lens implant; Colon Surgery; Colonoscopy; Cystoscopy; EGD; Excision of urethral prolapse; Hemorrhoidectomy; Hysterectomy; Lumbar laminectomy; Mitral valve replacement; Pacemaker; Wrist         Medication List  Bystolic 10 mg oral tablet; Cortizone-10 1 % topical cream; Desitin 13 % topical cream; dicyclomine 20 mg oral tablet; estradiol 0.01 % (0.1 mg/gram) vaginal cream; famotidine 20 mg oral tablet; fluticasone propionate 50 mcg/actuation nasal spray,suspension; hydrochlorothiazide 12.5 mg oral capsule; hydrocortisone 2.5 % topical cream; hydroxyzine HCl 25 mg oral tablet; levothyroxine 50 mcg oral tablet; lidocaine 5 % topical adhesive patch,medicated; lorazepam 0.5 mg oral tablet; losartan 25 mg oral tablet; Miralax 17 gram/dose oral powder; nystatin 100,000 unit/gram topical powder; Pepcid 20 mg oral tablet; Vision Formula (with lutein) 1,000 unit-200 mg-60 unit-2 mg oral tablet; Vitamin D3 1,000 unit oral capsule         Allergy List  Ambien; aspirin; Bactrim DS; Boniva; Cipro; ciprofloxacin; Cymbalta; levofloxacin; Lyrica; metronidazole; nitrofurantoin; Pamelor; ranitidine HCl; tramadol         Family Medical History  - No Family History of Colorectal Cancer; Family history of heart disease; Family history of diabetes mellitus         Reproductive History   2 Para 2 0 0 2       Social History  Active but no formal exercise; Alcohol (Never); ; No known  "infection risk; Not sexually active; Tobacco (Never)         Immunizations  Name Date Admin   Influenza 11/11/2020   Influenza 10/03/2019   Influenza 10/24/2018   Influenza 10/25/2017   Influenza 10/10/2016   Influenza 10/20/2015   Pneumococcal 11/01/2012   Prevnar 13 07/27/2016   Shingles 11/01/2012         Review of Systems  · Constitutional  o Denies  o : fever, night sweats, weight loss  · Eyes  o Denies  o : discharge from eye, impaired vision  · HENT  o Admits  o : *See HPI  · Cardiovascular  o Denies  o : chest pain, irregular heart beats  · Respiratory  o Denies  o : shortness of breath, wheezing, coughing up blood  · Gastrointestinal  o Denies  o : heartburn, reflux, vomiting blood  · Genitourinary  o Denies  o : frequency  · Integument  o Denies  o : rash, skin dryness  · Neurologic  o Admits  o : loss of balance, dizziness  o Denies  o : seizures, loss of consciousness  · Endocrine  o Denies  o : cold intolerance, heat intolerance  · Heme-Lymph  o Denies  o : easy bleeding, anemia      Vitals  Date Time BP Position Site L\R Cuff Size HR RR TEMP (F) WT  HT  BMI kg/m2 BSA m2 O2 Sat FR L/min FiO2        12/31/2020 12:55 PM        97.7 110lbs 0oz 5'  2\" 20.12 1.48             Physical Examination  · Constitutional  o Appearance  o : well developed, well-nourished, alert and in no acute distress, voice clear and strong  · Head and Face  o Head  o :   § Inspection  § : no deformities or lesions  o Face  o :   § Inspection  § : No facial lesions; House-Brackmann I/VI bilaterally  § Palpation  § : No TMJ crepitus nor  muscle tenderness bilaterally  · Eyes  o Vision  o :   § Visual Fields  § : Extraocular movements are intact. No spontaneous or gaze-induced nystagmus.  o Conjunctivae  o : clear  o Sclerae  o : clear  o Pupils and Irises  o : pupils equal, round, and reactive to light.   · Ears, Nose, Mouth and Throat  o Ears  o :   § External Ears  § : appearance within normal limits, no lesions " present  § Otoscopic Examination  § : tympanic membrane appearance within normal limits bilaterally without perforations, well-aerated middle ears  § Hearing  § : intact to conversational voice both ears  o Nose  o :   § External Nose  § : appearance normal  § Intranasal Exam  § : mucosa within normal limits, vestibules normal, no intranasal lesions present, septum midline, sinuses non tender to percussion  o Oral Cavity  o :   § Oral Mucosa  § : oral mucosa normal without pallor or cyanosis  § Lips  § : lip appearance normal  § Teeth  § : normal dentition for age  § Gums  § : gums pink, non-swollen, no bleeding present  § Tongue  § : tongue appearance normal; normal mobility  § Palate  § : hard palate normal, soft palate appearance normal with symmetric mobility  o Throat  o :   § Oropharynx  § : no inflammation or lesions present, tonsils within normal limits  § Hypopharynx  § : appearance within normal limits, superior epiglottis within normal limits  § Larynx  § : appearance within normal limits, vocal cords within normal limits, no lesions present  · Neck  o Inspection/Palpation  o : normal appearance, no masses or tenderness, submandibular gland slightly ptotic, normal on palpation, trachea midline; thyroid size normal, nontender, no nodules or masses present on palpation  · Respiratory  o Respiratory Effort  o : breathing unlabored  o Inspection of Chest  o : normal appearance, no retractions  · Cardiovascular  o Heart  o : regular rate and rhythm  · Lymphatic  o Neck  o : no lymphadenopathy present  o Supraclavicular Nodes  o : no lymphadenopathy present  o Preauricular Nodes  o : no lymphadenopathy present  · Skin and Subcutaneous Tissue  o General Inspection  o : Regarding face and neck - there are no rashes present, no lesions present, and no areas of discoloration  · Neurologic  o Cranial Nerves  o : cranial nerves II-XII are grossly intact bilaterally  o Gait and Station  o : normal gait, able to stand  without diffculty  · Psychiatric  o Judgement and Insight  o : judgment and insight intact  o Mood and Affect  o : mood normal, affect appropriate          Assessment  · GERD (gastroesophageal reflux disease)     530.81/K21.9  · Hypothyroidism     244.9/E03.9  · Globus pharyngeus     306.4/R09.89  · Submandibular gland inflammation     527.2/K11.20      Plan  · Medications  o Medications have been Reconciled  o Transition of Care or Provider Policy  · Instructions  o She presents the clinic today accompanied by her daughter for evaluation of hypothyroidism, globus sensation, GERD, and concerned about her left submandibular gland. She notes that she does tend to eat meals throughout the day and eats at night when she cannot sleep. She is on 2 antacid medications for GERD. Last several months she has noted a slight discomfort along the lower part of her neck along her thyroid gland. An ultrasound of her thyroid was performed and shows a small normal thyroid gland. She is on 50 mcg of Synthroid for hypothyroidism. On exam the thyroid gland is small and normal on palpation. I think her globus sensation is likely related to nighttime eating and silent reflux. We discussed this and I will have her make some changes.She is also has some concerns about discomfort along her left submandibular gland. Her primary care physician obtained a CT scan which shows a normal submandibular gland without any masses. She denies any swelling or pain, but notes that it is just occasionally uncomfortable. She is well hydrated and denies any foul taste in her mouth. On examination her submandibular gland on the left side is normal and was not tender. I have asked her to increase her hydration and use sialagogues such as lemon candy. If there is any persistent issues or worsening symptoms have asked her to contact me for further evaluation.  o Electronically Identified Patient Education Materials Provided  Electronically  · Correspondence  o ENT Letter to Referring MD (Kika VILLASENOR) - 12/31/2020            Electronically Signed by: Emerson Worley MD -Author on December 31, 2020 01:46:57 PM

## 2021-05-14 NOTE — PROGRESS NOTES
"   Progress Note      Patient Name: Fabiana Herbert   Patient ID: [id number]   Sex: Female   YOB: 1934    Primary Care Provider: Kika VILLASENOR   Referring Provider: Kika VILLASENOR    Visit Date: April 7, 2021    Provider: Daniel Anthony MD   Location: [facility]   Location Address: [address] [place] [address], [address]   [place]  [id number]   Location Phone: [phone]          Chief Complaint  · Short of breath  · Palpitations      History Of Present Illness  REFERRING PROVIDER: Kika VILLASENOR   Fabiana Herbert is an 86-year-old female with mitral valve replacement and hypertension who has been having trouble with shortness of breath and palpitations that have gotten worse over the last few months. It comes with exertion and sometimes at rest. She denies any definite chest pain except on occasion she will have some mild discomfort lasting seconds to minutes.   PAST MEDICAL HISTORY: Chronic kidney disease; Mitral valve replacement; Pacemaker.   PSYCHOSOCIAL HISTORY: Denies alcohol use. Denies tobacco use.   CURRENT MEDICATIONS: Medication list was reviewed and is as documented.      ALLERGIES: No known drug allergies.       Review of Systems  · Cardiovascular  o Admits  o : palpitations (fast, fluttering, or skipping beats), shortness of breath while walking or lying flat, chest pain or angina pectoris   o Denies  o : swelling (feet, ankles, hands)  · Respiratory  o Denies  o : chronic or frequent cough      Vitals  Date Time BP Position Site L\R Cuff Size HR RR TEMP (F) WT  HT  BMI kg/m2 BSA m2 O2 Sat FR L/min FiO2 HC       04/07/2021 11:13 /80 Sitting    70 - R   108lbs 0oz 5'  2\" 19.75 1.46       04/07/2021 11:13 /76 Sitting    76 - R                   Physical Examination  · Constitutional  o Appearance  o : Awake, alert, in no acute distress.  · Eyes  o Conjunctivae  o : Conjunctivae normal.  · Ears, Nose, Mouth and Throat  o Oral Cavity  o :   § Oral Mucosa  § : " Normal.  · Neck  o Inspection/Palpation/Auscultation  o : No lymphadenopathy. No JVD. No bruit. Good carotid upstroke.  · Respiratory  o Respiratory  o : Kyphosis with diminished breath sounds. Prolonged expiration. Negative rales or rhonchi.  · Cardiovascular  o Heart  o : PMI is displaced. S1, S2 regular. No S3. No S4. 1-2/6 systolic murmur at the apex. Negative diastolic murmur.   o Peripheral Vascular System  o :   § Extremities  § : Good femoral and pedal pulses. No pedal edema.  · Gastrointestinal  o Abdominal Examination  o : Soft. No masses or tenderness felt. No hepatosplenomegaly. Abdominal aorta is not palpable.  · EKG  o Indications  o : Chest discomfort, palpitations.  o Results  o : Atrial ventricular paced rhythm. Average rate 70.   · Labs  o Labs  o : She has not gotten blood work done.   · Device Interrogation  o Device Interrogation  o : Pacemaker was interrogated. Normal dual-chamber pacemaker function with estimated longevity of 4 year. 66% atrial paced. 98% ventricular paced.           Assessment     1.  Symptomatic palpitations and shortness of breath.  2.  Status post mitral valve replacement.   3.  Hypertensive heart disease.  4.  Status post mitral valve replacement with normally functioning valve.  5.  Chronic kidney disease.       Plan     I am going to reduce her pacemaker rate to 60 per minute and see if that helps her with her symptomatic palpitations. If that does not do it, we will go ahead and increase the dose of her Bystolic. In the interim, she will get CBC, chemistry panel, lipid panel, BRNP, and thyroid panel to make sure there is no secondary cause for her symptomatic palpitations and shortness of breath.  She will continue the aspirin and losartan and hydrochlorothiazide in the current dosage      Daniel Anthony MD, [facility]  PM/pap               Electronically Signed by: Daniel Anthony MD -Author on April 20, 2021 12:43:35 PM

## 2021-05-14 NOTE — PROGRESS NOTES
Progress Note      Patient Name: Fabiana Herbert   Patient ID: [id number]   Sex: Female   YOB: 1934    Primary Care Provider: Kika VILLASENOR   Referring Provider: Kika VILLASENOR    Visit Date: March 9, 2021    Provider: Emmanuel Serrano DPM   Location: [facility]   Location Address: [address] [place] [address]  [id number]   Location Phone: [phone]          Chief Complaint  · Right Foot Pain  · Left Foot Pain  · Ingrown Toenail      History Of Present Illness  Fabiana Herbert presents to the office today for evaluation and treatment of      New, Established, New Problem: est  Location: Right and Left first medial toenail border(s)  Duration:   Onset: Gradual  Nature: sore, sharp  Stable, worsening, improving: worsening, recurring  Aggravating factors: Patient relates pain is aggravated by shoe gear and ambulation.   Previous Treatment: Self debridement, debridement    Patient denies any fevers, chills, nausea, vomiting, shortness of breathe, nor any other constitutional signs nor symptoms.     Patient reports the following medical changes since their last visit:    - hospitalization for hemorrhoid banding by Dr. Rios last year.       Past Medical History  Anemia; Anxiety; Arthritis; Arthritis; Back pain; Bone Density Screening; Cramps, muscle, general; Difficulty in swallowing; Dizziness; Dizziness; Dysuria; EKG, abnormal; Fatigue; Headache; Hearing loss; Hyperlipidemia; Hypertension, essential; Hypothyroidism; Imbalance; Incontinence in female; Ingrown toenail; Insomnia; Iron (Fe) deficiency anemia; Lightheadedness; Low back pain; Lumbar degenerative disc disease; Lumbar/LS disc degeneration; Macular degeneration; Macular degeneration; Mitral Valve Disorders; Mitral valve replaced; Nocturia; Pacemaker; Pain, Joint; Pain, Leg; Pap smear for cervical cancer screening; Peptic ulcer disease; Ringing in ears; Sciatica; Screening Mammogram; Shortness Of Air; Urethral  caruncle; Vision changes; Weakness         Past Surgical History  Appendectomy; Breast biopsy, left breast; Cataract exctraction with lens implant; Colon Surgery; Colonoscopy; Cystoscopy; EGD; Excision of urethral prolapse; Hemorrhoidectomy; Hysterectomy; Lumbar laminectomy; Mitral valve replacement; Pacemaker; Wrist         Medication List  Bystolic 10 mg oral tablet; Cortizone-10 1 % topical cream; Desitin 13 % topical cream; dicyclomine 20 mg oral tablet; esomeprazole magnesium 20 mg oral capsule,delayed release(DR/EC); estradiol 0.01 % (0.1 mg/gram) vaginal cream; famotidine 20 mg oral tablet; fluticasone propionate 50 mcg/actuation nasal spray,suspension; hydrochlorothiazide 12.5 mg oral capsule; hydrocortisone 2.5 % topical cream; hydroxyzine HCl 25 mg oral tablet; levothyroxine 50 mcg oral tablet; lidocaine 5 % topical adhesive patch,medicated; lorazepam 0.5 mg oral tablet; losartan 25 mg oral tablet; Miralax 17 gram/dose oral powder; nystatin 100,000 unit/gram topical powder; Pepcid 20 mg oral tablet; rectal rockets 2.5 %-2%; Vision Formula (with lutein) 1,000 unit-200 mg-60 unit-2 mg oral tablet; Vitamin D3 1,000 unit oral capsule; Zofran 4 mg oral tablet         Allergy List  Ambien; aspirin; Bactrim DS; Boniva; Cipro; ciprofloxacin; Cymbalta; levofloxacin; Lyrica; metronidazole; nitrofurantoin; Pamelor; ranitidine HCl; tramadol       Allergies Reconciled  Family Medical History  - No Family History of Colorectal Cancer; Family history of heart disease; Family history of diabetes mellitus         Reproductive History   2 Para 2 0 0 2       Social History  Active but no formal exercise; Alcohol (Never); ; No known infection risk; Not sexually active; Tobacco (Never)         Immunizations  Name Date Admin   Influenza 2020   Influenza 10/03/2019   Influenza 10/24/2018   Influenza 10/25/2017   Influenza 10/10/2016   Influenza 10/20/2015   Pneumococcal 2012   Prevnar 13 2016  "  Shingles 11/01/2012         Review of Systems  · Constitutional  o Admits  o : good general health lately  o Denies  o : fever, chills, additional constitutional symptoms except as noted in the HPI  · Eyes  o Denies  o : double vision  · HENT  o Denies  o : vertigo, recent head injury  · Cardiovascular  o Denies  o : chest pain, irregular heart beats  · Respiratory  o Denies  o : shortness of breath  · Gastrointestinal  o Denies  o : nausea, vomiting  · Integument  o * See HPI  · Neurologic  o Denies  o : altered mental status, tingling or numbness  · Musculoskeletal  o Denies  o : joint pain, joint swelling, limitation of motion      Vitals  Date Time BP Position Site L\R Cuff Size HR RR TEMP (F) WT  HT  BMI kg/m2 BSA m2 O2 Sat FR L/min FiO2 HC       03/09/2021 01:26 /86 Sitting    70 - R  97.8 109lbs 0oz 5'  2\" 19.94 1.47 97 %      03/09/2021 01:26 /83 Sitting                       Physical Examination  · Constitutional  o Appearance  o : well-nourished, well developed, no obvious deformities present, appears to be chronically ill   · Cardiovascular  o Peripheral Vascular System  o :   § Pedal Pulses  § : bilateral pulse strength absent   § Extremities  § : No edema  · Musculoskeletal  o Extremeties/Joint  o : Lower extremity muscle strength and range of motion is equal and symmetrical bilaterally. The knees are noted to be in normal alignment. Ankle alignment and range of motion is normal and foot structure is normal. Subtalar, metatarsal and metatarsal-phalangeal joint range of motion is noted to be within normal limits. The digits of both feet are in normal alignment. The gait is normal.   · Neurologic  o Muskuloskeletal  o :   § RLE  § : Epicritic Sensation intact  § LLE  § : Epicritic Sensation intact  · Toes  o Toes: Right Foot  o :   § Toenails  § : Ingrowing Toenail at 1st Toe, medial side  o Toes: Left Foot  o :   § Toenails  § : Ingrowing Toenail at 1st Toe, medial " side  · Procedures  o Ingrown Toenail  o : Slant back debridement - This procedure is indicated for onychocryptosis. Indications, risks and benefits and alternative treatments have been discussed with this patient who has agreed to this procedure. The area was prepped with isopropyl alcohol solution. The offending nailborder was excised via slant back debridement with tissue nippers. A sterile dressing was applied. The patient tolerated the procedure well.           Assessment  · Foot pain, left     729.5/M79.672  · Foot pain, right     729.5/M79.671  · Ingrowing nail     703.0/L60.0  · PVD (peripheral vascular disease)     443.9/I73.9      Plan  · Medications  o Medications have been Reconciled  o Transition of Care or Provider Policy  · Instructions  o Follow-up in 9 weeks  o I have discussed the findings of this evaluation with the patient. The discussion included a complete verbal explanation of any changes in the examination results, diagnosis, and the current treatment plan. A schedule for future care needs was explained. If any questions should arise after returning home, I have encouraged the patient to feel free to contact Dr. Serrano. The patient states understanding and agreement with this plan.   o Pt to monitor for problems and to contact Dr. Serrano for follow-up should such signs occur. Patient states understanding and agreement with this plan.   o Electronically Identified Patient Education Materials Provided Electronically  · Disposition  o Call or Return if symptoms worsen or persist.            Electronically Signed by: Emmanuel Serrano DPM -Author on March 9, 2021 01:40:15 PM

## 2021-05-15 VITALS
HEART RATE: 70 BPM | DIASTOLIC BLOOD PRESSURE: 103 MMHG | SYSTOLIC BLOOD PRESSURE: 189 MMHG | BODY MASS INDEX: 20.61 KG/M2 | WEIGHT: 112 LBS | OXYGEN SATURATION: 97 % | HEIGHT: 62 IN | RESPIRATION RATE: 12 BRPM | TEMPERATURE: 96.6 F

## 2021-05-15 VITALS
HEIGHT: 62 IN | DIASTOLIC BLOOD PRESSURE: 95 MMHG | SYSTOLIC BLOOD PRESSURE: 157 MMHG | WEIGHT: 113.12 LBS | BODY MASS INDEX: 20.82 KG/M2 | HEART RATE: 76 BPM | OXYGEN SATURATION: 96 %

## 2021-05-15 VITALS
DIASTOLIC BLOOD PRESSURE: 90 MMHG | HEIGHT: 62 IN | HEART RATE: 72 BPM | WEIGHT: 112 LBS | SYSTOLIC BLOOD PRESSURE: 147 MMHG | BODY MASS INDEX: 20.61 KG/M2

## 2021-05-15 VITALS
OXYGEN SATURATION: 93 % | BODY MASS INDEX: 20.47 KG/M2 | TEMPERATURE: 97 F | HEART RATE: 72 BPM | RESPIRATION RATE: 12 BRPM | DIASTOLIC BLOOD PRESSURE: 86 MMHG | WEIGHT: 111.25 LBS | HEIGHT: 62 IN | SYSTOLIC BLOOD PRESSURE: 153 MMHG

## 2021-05-15 VITALS
SYSTOLIC BLOOD PRESSURE: 162 MMHG | WEIGHT: 110.37 LBS | BODY MASS INDEX: 20.31 KG/M2 | HEIGHT: 62 IN | DIASTOLIC BLOOD PRESSURE: 84 MMHG

## 2021-05-15 VITALS
BODY MASS INDEX: 20.43 KG/M2 | HEIGHT: 62 IN | HEART RATE: 70 BPM | SYSTOLIC BLOOD PRESSURE: 146 MMHG | DIASTOLIC BLOOD PRESSURE: 88 MMHG | WEIGHT: 111 LBS | OXYGEN SATURATION: 98 %

## 2021-05-15 VITALS
SYSTOLIC BLOOD PRESSURE: 137 MMHG | HEIGHT: 62 IN | HEART RATE: 74 BPM | WEIGHT: 112.25 LBS | DIASTOLIC BLOOD PRESSURE: 82 MMHG | OXYGEN SATURATION: 94 % | BODY MASS INDEX: 20.66 KG/M2

## 2021-05-15 VITALS — HEIGHT: 62 IN | RESPIRATION RATE: 12 BRPM | WEIGHT: 112.37 LBS | BODY MASS INDEX: 20.68 KG/M2

## 2021-05-15 VITALS
HEART RATE: 73 BPM | OXYGEN SATURATION: 95 % | BODY MASS INDEX: 20.86 KG/M2 | HEIGHT: 62 IN | WEIGHT: 113.37 LBS | SYSTOLIC BLOOD PRESSURE: 143 MMHG | DIASTOLIC BLOOD PRESSURE: 91 MMHG

## 2021-05-15 VITALS
WEIGHT: 113 LBS | DIASTOLIC BLOOD PRESSURE: 79 MMHG | BODY MASS INDEX: 20.8 KG/M2 | HEART RATE: 73 BPM | SYSTOLIC BLOOD PRESSURE: 136 MMHG | OXYGEN SATURATION: 99 % | HEIGHT: 62 IN

## 2021-05-15 VITALS
WEIGHT: 110 LBS | HEART RATE: 72 BPM | BODY MASS INDEX: 20.24 KG/M2 | TEMPERATURE: 97.8 F | SYSTOLIC BLOOD PRESSURE: 148 MMHG | OXYGEN SATURATION: 95 % | RESPIRATION RATE: 20 BRPM | DIASTOLIC BLOOD PRESSURE: 80 MMHG | HEIGHT: 62 IN

## 2021-05-15 VITALS
DIASTOLIC BLOOD PRESSURE: 89 MMHG | BODY MASS INDEX: 20.61 KG/M2 | HEIGHT: 62 IN | WEIGHT: 112 LBS | SYSTOLIC BLOOD PRESSURE: 163 MMHG

## 2021-05-15 VITALS — RESPIRATION RATE: 14 BRPM | WEIGHT: 110 LBS | BODY MASS INDEX: 20.24 KG/M2 | HEIGHT: 62 IN

## 2021-05-15 VITALS
HEIGHT: 62 IN | WEIGHT: 112 LBS | BODY MASS INDEX: 20.61 KG/M2 | SYSTOLIC BLOOD PRESSURE: 174 MMHG | HEART RATE: 72 BPM | DIASTOLIC BLOOD PRESSURE: 96 MMHG

## 2021-05-15 VITALS
SYSTOLIC BLOOD PRESSURE: 163 MMHG | WEIGHT: 109 LBS | BODY MASS INDEX: 20.06 KG/M2 | DIASTOLIC BLOOD PRESSURE: 88 MMHG | HEIGHT: 62 IN

## 2021-05-15 VITALS
BODY MASS INDEX: 20.52 KG/M2 | HEIGHT: 62 IN | DIASTOLIC BLOOD PRESSURE: 80 MMHG | WEIGHT: 111.5 LBS | SYSTOLIC BLOOD PRESSURE: 142 MMHG

## 2021-05-15 VITALS
DIASTOLIC BLOOD PRESSURE: 72 MMHG | HEIGHT: 62 IN | SYSTOLIC BLOOD PRESSURE: 135 MMHG | OXYGEN SATURATION: 97 % | BODY MASS INDEX: 20.26 KG/M2 | WEIGHT: 110.12 LBS | HEART RATE: 76 BPM

## 2021-05-15 VITALS
HEART RATE: 83 BPM | HEIGHT: 62 IN | DIASTOLIC BLOOD PRESSURE: 89 MMHG | BODY MASS INDEX: 20.8 KG/M2 | OXYGEN SATURATION: 94 % | SYSTOLIC BLOOD PRESSURE: 161 MMHG | WEIGHT: 113 LBS

## 2021-05-15 VITALS
BODY MASS INDEX: 20.43 KG/M2 | WEIGHT: 111 LBS | HEART RATE: 74 BPM | DIASTOLIC BLOOD PRESSURE: 92 MMHG | SYSTOLIC BLOOD PRESSURE: 150 MMHG | HEIGHT: 62 IN

## 2021-05-15 VITALS
HEIGHT: 62 IN | WEIGHT: 116 LBS | DIASTOLIC BLOOD PRESSURE: 106 MMHG | SYSTOLIC BLOOD PRESSURE: 166 MMHG | BODY MASS INDEX: 21.35 KG/M2

## 2021-05-15 VITALS — SYSTOLIC BLOOD PRESSURE: 150 MMHG | DIASTOLIC BLOOD PRESSURE: 89 MMHG

## 2021-05-15 VITALS
BODY MASS INDEX: 20.98 KG/M2 | HEART RATE: 85 BPM | HEIGHT: 62 IN | DIASTOLIC BLOOD PRESSURE: 90 MMHG | WEIGHT: 114 LBS | OXYGEN SATURATION: 97 % | SYSTOLIC BLOOD PRESSURE: 151 MMHG

## 2021-05-15 VITALS — SYSTOLIC BLOOD PRESSURE: 138 MMHG | DIASTOLIC BLOOD PRESSURE: 84 MMHG

## 2021-05-15 VITALS — RESPIRATION RATE: 11 BRPM | WEIGHT: 112.37 LBS | BODY MASS INDEX: 20.68 KG/M2 | HEIGHT: 62 IN

## 2021-05-15 VITALS — WEIGHT: 111.25 LBS | RESPIRATION RATE: 12 BRPM | BODY MASS INDEX: 20.47 KG/M2 | HEIGHT: 62 IN

## 2021-05-15 VITALS
DIASTOLIC BLOOD PRESSURE: 82 MMHG | WEIGHT: 112 LBS | OXYGEN SATURATION: 96 % | HEART RATE: 74 BPM | SYSTOLIC BLOOD PRESSURE: 129 MMHG

## 2021-05-16 VITALS
SYSTOLIC BLOOD PRESSURE: 152 MMHG | WEIGHT: 117 LBS | HEART RATE: 76 BPM | DIASTOLIC BLOOD PRESSURE: 86 MMHG | HEIGHT: 62 IN | BODY MASS INDEX: 21.53 KG/M2

## 2021-05-16 VITALS
SYSTOLIC BLOOD PRESSURE: 180 MMHG | HEART RATE: 84 BPM | WEIGHT: 114 LBS | HEIGHT: 62 IN | DIASTOLIC BLOOD PRESSURE: 98 MMHG | SYSTOLIC BLOOD PRESSURE: 166 MMHG | DIASTOLIC BLOOD PRESSURE: 96 MMHG | BODY MASS INDEX: 20.98 KG/M2

## 2021-05-16 VITALS
HEIGHT: 62 IN | SYSTOLIC BLOOD PRESSURE: 138 MMHG | WEIGHT: 111 LBS | RESPIRATION RATE: 16 BRPM | BODY MASS INDEX: 20.43 KG/M2 | TEMPERATURE: 97.3 F | HEART RATE: 72 BPM | DIASTOLIC BLOOD PRESSURE: 74 MMHG | OXYGEN SATURATION: 97 %

## 2021-05-16 VITALS — HEIGHT: 62 IN | RESPIRATION RATE: 16 BRPM | BODY MASS INDEX: 21.16 KG/M2 | WEIGHT: 115 LBS

## 2021-05-16 VITALS
SYSTOLIC BLOOD PRESSURE: 140 MMHG | HEIGHT: 62 IN | BODY MASS INDEX: 21.16 KG/M2 | HEART RATE: 68 BPM | DIASTOLIC BLOOD PRESSURE: 87 MMHG | WEIGHT: 115 LBS

## 2021-05-16 VITALS — WEIGHT: 100 LBS | HEIGHT: 62 IN | BODY MASS INDEX: 18.4 KG/M2 | RESPIRATION RATE: 16 BRPM

## 2021-05-16 VITALS
BODY MASS INDEX: 21.35 KG/M2 | DIASTOLIC BLOOD PRESSURE: 83 MMHG | HEIGHT: 62 IN | HEART RATE: 81 BPM | WEIGHT: 116 LBS | SYSTOLIC BLOOD PRESSURE: 125 MMHG

## 2021-05-16 VITALS
HEART RATE: 84 BPM | WEIGHT: 115 LBS | SYSTOLIC BLOOD PRESSURE: 153 MMHG | DIASTOLIC BLOOD PRESSURE: 88 MMHG | OXYGEN SATURATION: 97 % | DIASTOLIC BLOOD PRESSURE: 92 MMHG | HEART RATE: 79 BPM | SYSTOLIC BLOOD PRESSURE: 141 MMHG

## 2021-05-16 VITALS
HEIGHT: 62 IN | SYSTOLIC BLOOD PRESSURE: 131 MMHG | BODY MASS INDEX: 20.24 KG/M2 | HEART RATE: 76 BPM | WEIGHT: 110 LBS | DIASTOLIC BLOOD PRESSURE: 88 MMHG | OXYGEN SATURATION: 93 %

## 2021-05-16 VITALS — HEART RATE: 74 BPM | DIASTOLIC BLOOD PRESSURE: 98 MMHG | SYSTOLIC BLOOD PRESSURE: 144 MMHG

## 2021-05-16 VITALS
HEART RATE: 83 BPM | DIASTOLIC BLOOD PRESSURE: 87 MMHG | OXYGEN SATURATION: 98 % | WEIGHT: 115.5 LBS | SYSTOLIC BLOOD PRESSURE: 140 MMHG

## 2021-05-16 VITALS
HEART RATE: 80 BPM | HEIGHT: 62 IN | SYSTOLIC BLOOD PRESSURE: 156 MMHG | WEIGHT: 114.5 LBS | DIASTOLIC BLOOD PRESSURE: 92 MMHG | BODY MASS INDEX: 21.07 KG/M2

## 2021-05-16 VITALS
DIASTOLIC BLOOD PRESSURE: 76 MMHG | HEIGHT: 62 IN | SYSTOLIC BLOOD PRESSURE: 107 MMHG | WEIGHT: 113 LBS | HEART RATE: 78 BPM | BODY MASS INDEX: 20.8 KG/M2

## 2021-05-16 VITALS
BODY MASS INDEX: 20.24 KG/M2 | DIASTOLIC BLOOD PRESSURE: 90 MMHG | HEIGHT: 62 IN | WEIGHT: 110 LBS | SYSTOLIC BLOOD PRESSURE: 130 MMHG | HEART RATE: 78 BPM

## 2021-05-16 VITALS
HEART RATE: 77 BPM | HEIGHT: 62 IN | WEIGHT: 116.06 LBS | OXYGEN SATURATION: 97 % | BODY MASS INDEX: 21.36 KG/M2 | SYSTOLIC BLOOD PRESSURE: 156 MMHG | DIASTOLIC BLOOD PRESSURE: 93 MMHG

## 2021-05-16 VITALS — BODY MASS INDEX: 21.05 KG/M2 | HEIGHT: 62 IN | RESPIRATION RATE: 14 BRPM | WEIGHT: 114.37 LBS

## 2021-05-16 VITALS
TEMPERATURE: 97.5 F | HEIGHT: 62 IN | BODY MASS INDEX: 20.06 KG/M2 | DIASTOLIC BLOOD PRESSURE: 84 MMHG | WEIGHT: 109 LBS | HEART RATE: 76 BPM | SYSTOLIC BLOOD PRESSURE: 139 MMHG

## 2021-05-16 VITALS
DIASTOLIC BLOOD PRESSURE: 94 MMHG | HEIGHT: 62 IN | SYSTOLIC BLOOD PRESSURE: 146 MMHG | BODY MASS INDEX: 21.16 KG/M2 | WEIGHT: 115 LBS | HEART RATE: 82 BPM

## 2021-05-16 VITALS — RESPIRATION RATE: 18 BRPM | BODY MASS INDEX: 20.98 KG/M2 | WEIGHT: 114 LBS | HEIGHT: 62 IN

## 2021-05-16 VITALS
DIASTOLIC BLOOD PRESSURE: 70 MMHG | SYSTOLIC BLOOD PRESSURE: 126 MMHG | BODY MASS INDEX: 20.98 KG/M2 | WEIGHT: 114 LBS | HEART RATE: 81 BPM | HEIGHT: 62 IN

## 2021-05-16 VITALS
HEART RATE: 74 BPM | SYSTOLIC BLOOD PRESSURE: 140 MMHG | WEIGHT: 114 LBS | DIASTOLIC BLOOD PRESSURE: 96 MMHG | HEIGHT: 62 IN | BODY MASS INDEX: 20.98 KG/M2

## 2021-05-20 ENCOUNTER — HOSPITAL ENCOUNTER (OUTPATIENT)
Dept: LAB | Facility: HOSPITAL | Age: 86
Discharge: HOME | End: 2021-05-20
Attending: INTERNAL MEDICINE

## 2021-05-20 LAB
ANION GAP SERPL CALC-SCNC: 16 MMOL/L (ref 8–19)
BUN SERPL-MCNC: 26 MG/DL (ref 5–25)
BUN/CREAT SERPL: 20 {RATIO} (ref 6–20)
CALCIUM SERPL-MCNC: 10.2 MG/DL (ref 8.7–10.4)
CHLORIDE SERPL-SCNC: 103 MMOL/L (ref 99–111)
CONV CO2: 27 MMOL/L (ref 22–32)
CREAT UR-MCNC: 1.27 MG/DL (ref 0.5–0.9)
GFR SERPLBLD BASED ON 1.73 SQ M-ARVRAT: 38 ML/MIN/{1.73_M2}
GLUCOSE SERPL-MCNC: 102 MG/DL (ref 65–99)
OSMOLALITY SERPL CALC.SUM OF ELEC: 299 MOSM/KG (ref 273–304)
POTASSIUM SERPL-SCNC: 4.4 MMOL/L (ref 3.5–5.3)
SODIUM SERPL-SCNC: 142 MMOL/L (ref 135–147)

## 2021-05-26 ENCOUNTER — CONVERSION ENCOUNTER (OUTPATIENT)
Dept: FAMILY MEDICINE CLINIC | Facility: CLINIC | Age: 86
End: 2021-05-26

## 2021-05-26 ENCOUNTER — HOSPITAL ENCOUNTER (OUTPATIENT)
Dept: FAMILY MEDICINE CLINIC | Facility: CLINIC | Age: 86
Discharge: HOME | End: 2021-05-26
Attending: NURSE PRACTITIONER

## 2021-05-26 LAB
BILIRUB UR QL STRIP: NEGATIVE
COLOR UR: YELLOW
CONV CLARITY OF URINE: CLEAR
CONV PROTEIN IN URINE BY AUTOMATED TEST STRIP: NORMAL
CONV UROBILINOGEN IN URINE BY AUTOMATED TEST STRIP: NORMAL
GLUCOSE UR QL: NEGATIVE
HGB UR QL STRIP: NEGATIVE
KETONES UR QL STRIP: NEGATIVE
LEUKOCYTE ESTERASE UR QL STRIP: NEGATIVE
NITRITE UR QL STRIP: NEGATIVE
PH UR STRIP.AUTO: 6 [PH]
SP GR UR: NORMAL

## 2021-05-28 LAB — BACTERIA UR CULT: NORMAL

## 2021-05-29 ENCOUNTER — HOSPITAL ENCOUNTER (OUTPATIENT)
Dept: OTHER | Facility: HOSPITAL | Age: 86
Discharge: HOME | End: 2021-05-29
Attending: NURSE PRACTITIONER

## 2021-05-31 LAB
AMPICILLIN SUSC ISLT: <=2
AMPICILLIN SUSC ISLT: >=32
AMPICILLIN+SULBAC SUSC ISLT: 16
AMPICILLIN+SULBAC SUSC ISLT: <=2
BACTERIA UR CULT: ABNORMAL
CEFAZOLIN SUSC ISLT: 8
CEFAZOLIN SUSC ISLT: <=4
CEFEPIME SUSC ISLT: <=0.12
CEFEPIME SUSC ISLT: <=0.12
CEFTAZIDIME SUSC ISLT: <=1
CEFTAZIDIME SUSC ISLT: <=1
CEFTRIAXONE SUSC ISLT: <=0.25
CIPROFLOXACIN SUSC ISLT: <=0.25
CIPROFLOXACIN SUSC ISLT: <=0.25
ERTAPENEM SUSC ISLT: <=0.12
ERTAPENEM SUSC ISLT: <=0.12
GENTAMICIN SUSC ISLT: <=1
GENTAMICIN SUSC ISLT: <=1
LEVOFLOXACIN SUSC ISLT: <=0.12
LEVOFLOXACIN SUSC ISLT: <=0.12
NITROFURANTOIN SUSC ISLT: 128
NITROFURANTOIN SUSC ISLT: 32
PIP+TAZO SUSC ISLT: <=4
PIP+TAZO SUSC ISLT: <=4
TMP SMX SUSC ISLT: <=20
TMP SMX SUSC ISLT: >=320
TOBRAMYCIN SUSC ISLT: <=1
TOBRAMYCIN SUSC ISLT: <=1

## 2021-06-01 ENCOUNTER — PROCEDURE VISIT CONVERTED (OUTPATIENT)
Dept: PODIATRY | Facility: CLINIC | Age: 86
End: 2021-06-01
Attending: PODIATRIST

## 2021-06-05 NOTE — PROGRESS NOTES
Progress Note      Patient Name: Fabiana Herbert   Patient ID: [id number]   Sex: Female   YOB: 1934    Primary Care Provider: Kika VILLASENOR   Referring Provider: Kika VILLASENOR    Visit Date: June 1, 2021    Provider: Emmanuel Serrano DPM   Location: [facility]   Location Address: [address] [place] [address]  [id number]   Location Phone: [phone]          Chief Complaint  · Right Foot Pain  · Left Foot Pain  · Ingrown Toenail      History Of Present Illness  Fabiana Herbert presents to the office today for evaluation and treatment of      New, Established, New Problem: est  Location: Right and Left first medial toenail border(s)  Duration:   Onset: Gradual  Nature: sore, sharp  Stable, worsening, improving: worsening, recurring  Aggravating factors: Patient relates pain is aggravated by shoe gear and ambulation.   Previous Treatment: Self debridement, debridement    Patient denies any fevers, chills, nausea, vomiting, shortness of breathe, nor any other constitutional signs nor symptoms.     Patient reports the following medical changes since their last visit:    - hospitalization for hemorrhoid banding by Dr. Rios last year.       Past Medical History  Anemia; Anxiety; Arthritis; Arthritis; Back pain; Bone Density Screening; Cramps, muscle, general; Difficulty in swallowing; Dizziness; Dizziness; Dysuria; EKG, abnormal; Fatigue; Headache; Hearing loss; Hyperlipidemia; Hypertension, essential; Hypothyroidism; Imbalance; Incontinence in female; Ingrown toenail; Insomnia; Iron (Fe) deficiency anemia; Lightheadedness; Low back pain; Lumbar degenerative disc disease; Lumbar/LS disc degeneration; Macular degeneration; Macular degeneration; Mitral Valve Disorders; Mitral valve replaced; Nocturia; Pacemaker; Pain, Joint; Pain, Leg; Pap smear for cervical cancer screening; Peptic ulcer disease; Ringing in ears; Sciatica; Screening Mammogram; Shortness Of Air; Urethral  Caruncle; Vision changes; Weakness         Past Surgical History  Appendectomy; Breast biopsy, left breast; Cataract exctraction with lens implant; Colon Surgery; Colonoscopy; Cystoscopy; EGD; Excision of urethral prolapse; Hemorrhoidectomy; Hysterectomy; Lumbar laminectomy; Mitral valve replacement; Pacemaker; Wrist         Medication List  Augmentin 500-125 mg oral tablet; Bystolic 10 mg oral tablet; Cortizone-10 1 % topical cream; Desitin 13 % topical cream; dicyclomine 20 mg oral tablet; esomeprazole magnesium 20 mg oral capsule,delayed release(DR/EC); estradiol 0.01 % (0.1 mg/gram) vaginal cream; famotidine 20 mg oral tablet; fluticasone propionate 50 mcg/actuation nasal spray,suspension; hydrochlorothiazide 12.5 mg oral capsule; hydrocortisone 2.5 % topical cream; hydroxyzine HCl 25 mg oral tablet; levothyroxine 50 mcg oral tablet; lidocaine 5 % topical adhesive patch,medicated; lorazepam 0.5 mg oral tablet; losartan 25 mg oral tablet; Miralax 17 gram/dose oral powder; mupirocin 2 % topical ointment; nystatin 100,000 unit/gram topical powder; rectal rockets 2.5 %-2%; spironolactone 25 mg oral tablet; Vision Formula (with lutein) 1,000 unit-200 mg-60 unit-2 mg oral tablet; Vitamin D3 1,000 unit oral capsule; Zofran 4 mg oral tablet         Allergy List  Ambien; aspirin; Bactrim DS; Boniva; Cipro; ciprofloxacin; Cymbalta; levofloxacin; Lyrica; metronidazole; nitrofurantoin; Pamelor; ranitidine HCl; tramadol       Allergies Reconciled  Family Medical History  - No Family History of Colorectal Cancer; Family history of heart disease; Family history of diabetes mellitus         Reproductive History   2 Para 2 0 0 2       Social History  Active but no formal exercise; Alcohol (Never); ; No known infection risk; Not sexually active; Tobacco (Never)         Immunizations  Name Date Admin   Influenza 2020   Influenza 10/03/2019   Influenza 10/24/2018   Influenza 10/25/2017   Influenza 10/10/2016  "  Influenza 10/20/2015   Pneumococcal 11/01/2012   Prevnar 13 07/27/2016   Shingles 11/01/2012         Review of Systems  · Constitutional  o Admits  o : good general health lately  o Denies  o : fever, chills, additional constitutional symptoms except as noted in the HPI  · Eyes  o Denies  o : double vision  · HENT  o Denies  o : vertigo, recent head injury  · Cardiovascular  o Denies  o : chest pain, irregular heart beats  · Respiratory  o Denies  o : shortness of breath  · Gastrointestinal  o Denies  o : nausea, vomiting  · Integument  o * See HPI  · Neurologic  o Denies  o : altered mental status, tingling or numbness  · Musculoskeletal  o Denies  o : joint pain, joint swelling, limitation of motion      Vitals  Date Time BP Position Site L\R Cuff Size HR RR TEMP (F) WT  HT  BMI kg/m2 BSA m2 O2 Sat FR L/min FiO2 HC       06/01/2021 01:53 /90 Sitting    74 - R  97.5 101lbs 16oz 5'  2\" 18.66 1.42 100 %      06/01/2021 01:53 /90 Sitting                       Physical Examination  · Constitutional  o Appearance  o : well-nourished, well developed, no obvious deformities present, appears to be chronically ill   · Cardiovascular  o Peripheral Vascular System  o :   § Pedal Pulses  § : bilateral pulse strength absent   § Extremities  § : No edema  · Musculoskeletal  o Extremeties/Joint  o : Lower extremity muscle strength and range of motion is equal and symmetrical bilaterally. The knees are noted to be in normal alignment. Ankle alignment and range of motion is normal and foot structure is normal. Subtalar, metatarsal and metatarsal-phalangeal joint range of motion is noted to be within normal limits. The digits of both feet are in normal alignment. The gait is normal.   · Neurologic  o Muskuloskeletal  o :   § RLE  § : Epicritic Sensation intact  § LLE  § : Epicritic Sensation intact  · Toes  o Toes: Right Foot  o :   § Toenails  § : Ingrowing Toenail at 1st Toe, medial side  o Toes: Left Foot  o : "   § Toenails  § : Ingrowing Toenail at 1st Toe, medial side  · Procedures  o Ingrown Toenail  o : Slant back debridement - This procedure is indicated for onychocryptosis. Indications, risks and benefits and alternative treatments have been discussed with this patient who has agreed to this procedure. The area was prepped with isopropyl alcohol solution. The offending nailborder was excised via slant back debridement with tissue nippers. A sterile dressing was applied. The patient tolerated the procedure well.           Assessment  · Foot pain, left     729.5/M79.672  · Foot pain, right     729.5/M79.671  · Ingrowing nail     703.0/L60.0  · PVD (peripheral vascular disease)     443.9/I73.9      Plan  · Medications  o Medications have been Reconciled  o Transition of Care or Provider Policy  · Instructions  o Patient request PRN follow-up.  o I have discussed the findings of this evaluation with the patient. The discussion included a complete verbal explanation of any changes in the examination results, diagnosis, and the current treatment plan. A schedule for future care needs was explained. If any questions should arise after returning home, I have encouraged the patient to feel free to contact Dr. Serrano. The patient states understanding and agreement with this plan.   o Pt to monitor for problems and to contact Dr. Serrano for follow-up should such signs occur. Patient states understanding and agreement with this plan.   o Electronically Identified Patient Education Materials Provided Electronically  · Disposition  o Call or Return if symptoms worsen or persist.            Electronically Signed by: Emmanuel Serrano DPM -Author on June 1, 2021 01:59:47 PM

## 2021-06-18 ENCOUNTER — OFFICE VISIT (OUTPATIENT)
Dept: FAMILY MEDICINE CLINIC | Facility: CLINIC | Age: 86
End: 2021-06-18

## 2021-06-18 VITALS
HEART RATE: 67 BPM | WEIGHT: 104 LBS | HEIGHT: 62 IN | BODY MASS INDEX: 19.14 KG/M2 | OXYGEN SATURATION: 97 % | SYSTOLIC BLOOD PRESSURE: 119 MMHG | DIASTOLIC BLOOD PRESSURE: 64 MMHG

## 2021-06-18 DIAGNOSIS — N36.2 URETHRAL CARUNCLE: ICD-10-CM

## 2021-06-18 DIAGNOSIS — R30.0 DIFFICULT OR PAINFUL URINATION: ICD-10-CM

## 2021-06-18 DIAGNOSIS — K59.00 CONSTIPATION, UNSPECIFIED CONSTIPATION TYPE: ICD-10-CM

## 2021-06-18 DIAGNOSIS — R35.0 URINARY FREQUENCY: ICD-10-CM

## 2021-06-18 DIAGNOSIS — F41.9 ANXIETY: ICD-10-CM

## 2021-06-18 DIAGNOSIS — Z87.19 HISTORY OF DIVERTICULITIS: Primary | ICD-10-CM

## 2021-06-18 DIAGNOSIS — R11.0 NAUSEA: ICD-10-CM

## 2021-06-18 DIAGNOSIS — K64.9 HEMORRHOIDS, UNSPECIFIED HEMORRHOID TYPE: ICD-10-CM

## 2021-06-18 DIAGNOSIS — R32 INCONTINENCE IN FEMALE: ICD-10-CM

## 2021-06-18 DIAGNOSIS — K27.9 PEPTIC ULCER DISEASE: ICD-10-CM

## 2021-06-18 PROBLEM — M19.90 ARTHRITIS: Status: ACTIVE | Noted: 2021-06-18

## 2021-06-18 PROBLEM — H93.19 RINGING IN EARS: Status: ACTIVE | Noted: 2021-06-18

## 2021-06-18 PROBLEM — H91.90 HEARING LOSS: Status: ACTIVE | Noted: 2021-06-18

## 2021-06-18 PROBLEM — R42 LIGHTHEADEDNESS: Status: ACTIVE | Noted: 2018-03-14

## 2021-06-18 PROBLEM — D64.9 ANEMIA: Status: ACTIVE | Noted: 2021-06-18

## 2021-06-18 PROBLEM — Z95.0 PACEMAKER: Status: ACTIVE | Noted: 2018-02-23

## 2021-06-18 PROBLEM — I10 HYPERTENSION, ESSENTIAL: Status: ACTIVE | Noted: 2021-06-18

## 2021-06-18 PROBLEM — E78.5 HYPERLIPIDEMIA: Status: ACTIVE | Noted: 2021-06-18

## 2021-06-18 PROBLEM — R53.1 WEAKNESS: Status: ACTIVE | Noted: 2021-06-18

## 2021-06-18 PROBLEM — R13.10 DIFFICULTY IN SWALLOWING: Status: ACTIVE | Noted: 2021-06-18

## 2021-06-18 PROBLEM — R53.83 FATIGUE: Status: ACTIVE | Noted: 2021-06-18

## 2021-06-18 PROBLEM — D50.9 IRON (FE) DEFICIENCY ANEMIA: Status: ACTIVE | Noted: 2017-02-01

## 2021-06-18 PROBLEM — G47.00 INSOMNIA: Status: ACTIVE | Noted: 2021-06-18

## 2021-06-18 PROBLEM — E03.9 HYPOTHYROIDISM: Status: ACTIVE | Noted: 2021-06-18

## 2021-06-18 PROBLEM — H35.30 MACULAR DEGENERATION: Status: ACTIVE | Noted: 2021-06-18

## 2021-06-18 PROBLEM — R26.89 IMBALANCE: Status: ACTIVE | Noted: 2021-06-18

## 2021-06-18 PROBLEM — R35.1 NOCTURIA: Status: ACTIVE | Noted: 2019-05-24

## 2021-06-18 PROBLEM — M51.36 LUMBAR DEGENERATIVE DISC DISEASE: Status: ACTIVE | Noted: 2017-02-01

## 2021-06-18 PROBLEM — I05.9 MITRAL VALVE DISORDER: Status: ACTIVE | Noted: 2021-06-18

## 2021-06-18 LAB
BILIRUB BLD-MCNC: NEGATIVE MG/DL
CLARITY, POC: CLEAR
COLOR UR: YELLOW
GLUCOSE UR STRIP-MCNC: NEGATIVE MG/DL
KETONES UR QL: NEGATIVE
LEUKOCYTE EST, POC: NEGATIVE
NITRITE UR-MCNC: NEGATIVE MG/ML
PH UR: 5.5 [PH] (ref 5–8)
PROT UR STRIP-MCNC: ABNORMAL MG/DL
RBC # UR STRIP: NEGATIVE /UL
SP GR UR: 1.03 (ref 1–1.03)
UROBILINOGEN UR QL: NORMAL

## 2021-06-18 PROCEDURE — 1159F MED LIST DOCD IN RCRD: CPT | Performed by: NURSE PRACTITIONER

## 2021-06-18 PROCEDURE — G0439 PPPS, SUBSEQ VISIT: HCPCS | Performed by: NURSE PRACTITIONER

## 2021-06-18 PROCEDURE — 81003 URINALYSIS AUTO W/O SCOPE: CPT | Performed by: NURSE PRACTITIONER

## 2021-06-18 PROCEDURE — 1125F AMNT PAIN NOTED PAIN PRSNT: CPT | Performed by: NURSE PRACTITIONER

## 2021-06-18 PROCEDURE — 99214 OFFICE O/P EST MOD 30 MIN: CPT | Performed by: NURSE PRACTITIONER

## 2021-06-18 PROCEDURE — 1170F FXNL STATUS ASSESSED: CPT | Performed by: NURSE PRACTITIONER

## 2021-06-18 PROCEDURE — 96160 PT-FOCUSED HLTH RISK ASSMT: CPT | Performed by: NURSE PRACTITIONER

## 2021-06-18 RX ORDER — SPIRONOLACTONE 25 MG/1
25 TABLET ORAL EVERY OTHER DAY
COMMUNITY
Start: 2021-05-12 | End: 2022-04-13 | Stop reason: SDUPTHER

## 2021-06-18 RX ORDER — PSEUDOEPHEDRINE HCL 30 MG
TABLET ORAL
COMMUNITY
End: 2022-02-15

## 2021-06-18 RX ORDER — LEVOTHYROXINE SODIUM 0.05 MG/1
50 TABLET ORAL DAILY
COMMUNITY
Start: 2021-04-23 | End: 2021-11-15 | Stop reason: SDUPTHER

## 2021-06-18 RX ORDER — HYDROCHLOROTHIAZIDE 12.5 MG/1
12.5 CAPSULE, GELATIN COATED ORAL DAILY
COMMUNITY
Start: 2021-04-07 | End: 2021-07-19

## 2021-06-18 RX ORDER — POLYETHYLENE GLYCOL 3350 17 G/17G
17 POWDER, FOR SOLUTION ORAL DAILY PRN
COMMUNITY

## 2021-06-18 RX ORDER — HYDROXYZINE HYDROCHLORIDE 25 MG/1
25 TABLET, FILM COATED ORAL 2 TIMES DAILY PRN
COMMUNITY
End: 2021-07-20 | Stop reason: SDUPTHER

## 2021-06-18 RX ORDER — LORAZEPAM 0.5 MG/1
0.5 TABLET ORAL DAILY PRN
Qty: 90 TABLET | Refills: 0 | Status: SHIPPED | OUTPATIENT
Start: 2021-06-18 | End: 2021-08-18 | Stop reason: SDUPTHER

## 2021-06-18 RX ORDER — ESTRADIOL 0.1 MG/G
CREAM VAGINAL
COMMUNITY
Start: 2021-05-24 | End: 2021-07-19

## 2021-06-18 RX ORDER — LOSARTAN POTASSIUM 25 MG/1
25 TABLET ORAL DAILY
COMMUNITY
Start: 2021-06-11 | End: 2022-01-28 | Stop reason: SDUPTHER

## 2021-06-18 RX ORDER — NEBIVOLOL HYDROCHLORIDE 10 MG/1
10 TABLET ORAL
COMMUNITY
Start: 2021-06-04 | End: 2021-08-31

## 2021-06-18 RX ORDER — FAMOTIDINE 20 MG/1
TABLET, FILM COATED ORAL
COMMUNITY
Start: 2021-05-07 | End: 2021-09-09 | Stop reason: SDUPTHER

## 2021-06-18 RX ORDER — ONDANSETRON 4 MG/1
TABLET, FILM COATED ORAL
COMMUNITY
Start: 2021-04-07 | End: 2021-06-18 | Stop reason: SDUPTHER

## 2021-06-18 RX ORDER — LORAZEPAM 0.5 MG/1
TABLET ORAL
COMMUNITY
Start: 2021-05-13 | End: 2021-06-18 | Stop reason: SDUPTHER

## 2021-06-18 RX ORDER — ONDANSETRON 4 MG/1
4 TABLET, FILM COATED ORAL EVERY 8 HOURS PRN
Qty: 30 TABLET | Refills: 2 | Status: SHIPPED | OUTPATIENT
Start: 2021-06-18 | End: 2022-05-03 | Stop reason: SDUPTHER

## 2021-06-18 RX ORDER — BACILLUS COAGULANS/INULIN 1B-250 MG
CAPSULE ORAL
COMMUNITY
End: 2022-11-03

## 2021-06-18 NOTE — PROGRESS NOTES
The ABCs of the Annual Wellness Visit  Subsequent Medicare Wellness Visit    Chief Complaint   Patient presents with   • Medicare Wellness-subsequent   Pt states that she still feels like she has UTI.  Her stomach bothering her, having a lot bloating feels like the esomeprazole magnesium 20 mg.  She not sure if her Diverticulitis or what it is. Pt needs refill on her Lorazepam 0.5mg QHS and Zofran 4 mg.     Subjective   History of Present Illness:  Fabiana Herbert is a 86 y.o. female who presents for a Subsequent Medicare Wellness Visit.    HEALTH RISK ASSESSMENT    Recent Hospitalizations:  No hospitalization(s) within the last year.    Current Medical Providers:  Patient Care Team:  Kika Stevenson APRN as PCP - General (Nurse Practitioner)    Smoking Status:  Social History     Tobacco Use   Smoking Status Never Smoker   Smokeless Tobacco Never Used       Alcohol Consumption:  Social History     Substance and Sexual Activity   Alcohol Use Never       Depression Screen:   PHQ-2/PHQ-9 Depression Screening 6/18/2021   Little interest or pleasure in doing things 0   Feeling down, depressed, or hopeless 0   Total Score 0       Fall Risk Screen:  YADY Fall Risk Assessment was completed, and patient is at MODERATE risk for falls. Assessment completed on:6/18/2021    Health Habits and Functional and Cognitive Screening:  No flowsheet data found.      Does the patient have evidence of cognitive impairment? No    Asprin use counseling:Does not need ASA (and currently is not on it)    Age-appropriate Screening Schedule:  Refer to the list below for future screening recommendations based on patient's age, sex and/or medical conditions. Orders for these recommended tests are listed in the plan section. The patient has been provided with a written plan.    Health Maintenance   Topic Date Due   • TDAP/TD VACCINES (1 - Tdap) Never done   • ZOSTER VACCINE (2 of 3) 12/27/2012   • INFLUENZA VACCINE  08/01/2021   • DXA SCAN   11/06/2021   • LIPID PANEL  12/14/2021          The following portions of the patient's history were reviewed and updated as appropriate: allergies, current medications, past family history, past medical history, past social history, past surgical history and problem list.    Outpatient Medications Prior to Visit   Medication Sig Dispense Refill   • Bacillus Coagulans-Inulin (Probiotic) 1-250 BILLION-MG capsule Probiotic 15 billion cell oral capsule take 2 capsules by oral route daily   Active     • Bystolic 10 MG tablet Take 10 mg by mouth every night at bedtime.     • docusate sodium 100 MG capsule Stool Softener 100 mg oral capsule take 1 capsule (100 mg) by oral route once daily   Suspended     • esomeprazole (nexIUM) 20 MG capsule TAKE 1 CAPSULE BY MOUTH ONCE DAILY AT LEAST 1 HOUR BEFORE A MEAL     • estradiol (ESTRACE) 0.1 MG/GM vaginal cream INSERT 1 GRAM VAGINALLY 3 TIMES EVERY WEEK AT NIGHT     • famotidine (PEPCID) 20 MG tablet      • hydroCHLOROthiazide (MICROZIDE) 12.5 MG capsule Take 12.5 mg by mouth Daily.     • hydrocortisone 2.5 % cream Apply  topically to the appropriate area as directed 2 (Two) Times a Day. hc 2.5%/lod 2% rectal rocket-Insert 1 rocket rectally once day as needed     • hydrOXYzine (ATARAX) 25 MG tablet Take 25 mg by mouth 2 (Two) Times a Day As Needed for Itching.     • levothyroxine (SYNTHROID, LEVOTHROID) 50 MCG tablet      • LORazepam (ATIVAN) 0.5 MG tablet      • losartan (COZAAR) 25 MG tablet Take 25 mg by mouth Daily.     • Multiple Vitamins-Minerals (VISION FORMULA 2 PO) Vision Formula (with lutein) 1,000 unit-200 mg-60 unit-2 mg oral tablet take 1 tablet by oral route QD (OTC)   Active     • mupirocin (BACTROBAN) 2 % ointment APPLY SMALL AMOUNT TOPICALLY TO THE AFFECTED AREA THREE TIMES DAILY     • ondansetron (ZOFRAN) 4 MG tablet      • polyethylene glycol (MiraLax) 17 GM/SCOOP powder 17 g.     • spironolactone (ALDACTONE) 25 MG tablet        No facility-administered  "medications prior to visit.       Patient Active Problem List   Diagnosis   • Anemia   • Anxiety   • Arthritis   • Lumbar degenerative disc disease   • Lightheadedness   • Difficulty in swallowing   • Fatigue   • Weakness   • Hearing loss   • Hyperlipidemia   • Hypertension, essential   • Hypothyroidism   • Imbalance   • Incontinence in female   • Nocturia   • Insomnia   • Iron (Fe) deficiency anemia   • Difficult or painful urination   • Macular degeneration   • Mitral valve disorder   • Pacemaker   • Peptic ulcer disease   • Ringing in ears   • Urethral caruncle   • Constipation   • History of diverticulitis   • Urinary frequency   • Hemorrhoids       Advanced Care Planning:  ACP discussion was held with the patient during this visit. Patient has an advance directive in EMR which is still valid.     Review of Systems    Compared to one year ago, the patient feels her physical health is the same.  Compared to one year ago, the patient feels her mental health is worse.    Reviewed chart for potential of high risk medication in the elderly: yes  Reviewed chart for potential of harmful drug interactions in the elderly:yes    Objective         Vitals:    06/18/21 0942   Height: 157.5 cm (62\")       Body mass index is 18.66 kg/m².  Discussed the patient's BMI with her. The BMI is below average; no BMI management plan is appropriate.    Physical Exam  Constitutional:       Appearance: Normal appearance.   Neck:      Thyroid: No thyroid mass, thyromegaly or thyroid tenderness.   Cardiovascular:      Rate and Rhythm: Normal rate and regular rhythm.      Heart sounds: Normal heart sounds.   Pulmonary:      Breath sounds: Normal breath sounds.   Skin:     General: Skin is warm and dry.   Neurological:      General: No focal deficit present.      Mental Status: She is alert and oriented to person, place, and time.   Psychiatric:         Mood and Affect: Mood normal.         Behavior: Behavior normal.         Lab Results "   Component Value Date     (H) 05/20/2021        Assessment/Plan   Medicare Risks and Personalized Health Plan  CMS Preventative Services Quick Reference  Fall Risk  Immunizations Discussed/Encouraged (specific immunizations; Tdap )  Urinary Incontinence    The above risks/problems have been discussed with the patient.  Pertinent information has been shared with the patient in the After Visit Summary.  Follow up plans and orders are seen below in the Assessment/Plan Section.    Admits she is having urinary freq-she is waking up twice at night to urinate.   Hemorrhoids-admits her hemorrhoids are back-she is using fleet suppositories and rectal rockets as needed. She takes mirilax for constipation as well as stool softeners. Hx of diverticulitis. She had a colon resection in 1971 states 18 inches were removed due to the diverticulitis. U/A performed in the office today negative for infection.    States she is unsure if the nexium and pepcid are working denies any hb/reflux even prior to being on them she would like to try off these to see how she does-instructed to stop pepcid x 2 wks and if she is still not having symptoms she can try holding the nexium-if she starts having symptoms she can restart.     States  decreased her rate of her pacemaker-states 2 wks ago she had a spell where she had palpitations and dizziness-states the spell lasted an hour-states she sit down an rested and it resolved. States when this occurs she has to hold onto something or she will pass out. Admits she has had dizziness since her heart surgery in 2017. She has a follow up with  next wk.     Request rf of lorazepam for anxiety-uds and gayla utd-refilled.     Diagnoses and all orders for this visit:    1. History of diverticulitis (Primary)    2. Constipation, unspecified constipation type    3. Peptic ulcer disease    4. Hemorrhoids, unspecified hemorrhoid type    5. Incontinence in female    6. Difficult or  painful urination    7. Urethral caruncle    8. Urinary frequency      Follow Up:  Return in about 3 months (around 9/18/2021).     An After Visit Summary and PPPS were given to the patient.

## 2021-07-15 VITALS
BODY MASS INDEX: 18.77 KG/M2 | WEIGHT: 102 LBS | HEIGHT: 62 IN | TEMPERATURE: 97.5 F | SYSTOLIC BLOOD PRESSURE: 152 MMHG | DIASTOLIC BLOOD PRESSURE: 90 MMHG | OXYGEN SATURATION: 100 % | HEART RATE: 74 BPM

## 2021-07-18 DIAGNOSIS — I10 HYPERTENSION, ESSENTIAL: Primary | ICD-10-CM

## 2021-07-18 DIAGNOSIS — N95.2 ATROPHIC VAGINITIS: Primary | ICD-10-CM

## 2021-07-19 RX ORDER — HYDROCHLOROTHIAZIDE 12.5 MG/1
CAPSULE, GELATIN COATED ORAL
Qty: 90 CAPSULE | Refills: 1 | Status: SHIPPED | OUTPATIENT
Start: 2021-07-19 | End: 2021-12-29

## 2021-07-19 RX ORDER — ESTRADIOL 0.1 MG/G
1 CREAM VAGINAL
Qty: 42.5 G | Refills: 3 | Status: SHIPPED | OUTPATIENT
Start: 2021-07-19 | End: 2021-07-20 | Stop reason: SDUPTHER

## 2021-07-20 ENCOUNTER — OFFICE VISIT (OUTPATIENT)
Dept: FAMILY MEDICINE CLINIC | Facility: CLINIC | Age: 86
End: 2021-07-20

## 2021-07-20 VITALS
BODY MASS INDEX: 19.51 KG/M2 | HEIGHT: 62 IN | HEART RATE: 68 BPM | WEIGHT: 106 LBS | DIASTOLIC BLOOD PRESSURE: 73 MMHG | OXYGEN SATURATION: 97 % | SYSTOLIC BLOOD PRESSURE: 145 MMHG

## 2021-07-20 DIAGNOSIS — M54.41 CHRONIC BILATERAL LOW BACK PAIN WITH BILATERAL SCIATICA: ICD-10-CM

## 2021-07-20 DIAGNOSIS — H35.30 MACULAR DEGENERATION OF BOTH EYES, UNSPECIFIED TYPE: ICD-10-CM

## 2021-07-20 DIAGNOSIS — K64.9 HEMORRHOIDS, UNSPECIFIED HEMORRHOID TYPE: ICD-10-CM

## 2021-07-20 DIAGNOSIS — E03.9 HYPOTHYROIDISM, UNSPECIFIED TYPE: ICD-10-CM

## 2021-07-20 DIAGNOSIS — R30.0 DYSURIA: ICD-10-CM

## 2021-07-20 DIAGNOSIS — N39.0 URINARY TRACT INFECTION WITHOUT HEMATURIA, SITE UNSPECIFIED: Primary | ICD-10-CM

## 2021-07-20 DIAGNOSIS — K59.00 CONSTIPATION, UNSPECIFIED CONSTIPATION TYPE: ICD-10-CM

## 2021-07-20 DIAGNOSIS — M54.42 CHRONIC BILATERAL LOW BACK PAIN WITH BILATERAL SCIATICA: ICD-10-CM

## 2021-07-20 DIAGNOSIS — L29.9 PRURITUS: ICD-10-CM

## 2021-07-20 DIAGNOSIS — I10 ESSENTIAL HYPERTENSION: ICD-10-CM

## 2021-07-20 DIAGNOSIS — N95.2 ATROPHIC VAGINITIS: ICD-10-CM

## 2021-07-20 DIAGNOSIS — G89.29 CHRONIC BILATERAL LOW BACK PAIN WITH BILATERAL SCIATICA: ICD-10-CM

## 2021-07-20 LAB
BILIRUB BLD-MCNC: NEGATIVE MG/DL
CLARITY, POC: CLEAR
COLOR UR: YELLOW
GLUCOSE UR STRIP-MCNC: NEGATIVE MG/DL
KETONES UR QL: NEGATIVE
LEUKOCYTE EST, POC: NEGATIVE
NITRITE UR-MCNC: NEGATIVE MG/ML
PH UR: 5.5 [PH] (ref 5–8)
PROT UR STRIP-MCNC: NEGATIVE MG/DL
RBC # UR STRIP: NEGATIVE /UL
SP GR UR: 1.02 (ref 1–1.03)
UROBILINOGEN UR QL: NORMAL

## 2021-07-20 PROCEDURE — 87086 URINE CULTURE/COLONY COUNT: CPT | Performed by: NURSE PRACTITIONER

## 2021-07-20 PROCEDURE — 81003 URINALYSIS AUTO W/O SCOPE: CPT | Performed by: NURSE PRACTITIONER

## 2021-07-20 PROCEDURE — 99214 OFFICE O/P EST MOD 30 MIN: CPT | Performed by: NURSE PRACTITIONER

## 2021-07-20 RX ORDER — ESTRADIOL 0.1 MG/G
1 CREAM VAGINAL
Qty: 42.5 G | Refills: 3 | Status: SHIPPED | OUTPATIENT
Start: 2021-07-20 | End: 2021-07-22

## 2021-07-20 RX ORDER — HYDROXYZINE HYDROCHLORIDE 25 MG/1
TABLET, FILM COATED ORAL
Qty: 60 TABLET | Refills: 2 | Status: SHIPPED | OUTPATIENT
Start: 2021-07-20 | End: 2022-08-30 | Stop reason: SDUPTHER

## 2021-07-20 RX ORDER — LIDOCAINE 50 MG/G
2 PATCH TOPICAL DAILY
COMMUNITY
Start: 2021-06-26 | End: 2021-08-24

## 2021-07-20 NOTE — PROGRESS NOTES
Chief Complaint  Urinary Frequency and Urinary Urgency    Subjective          Fabiana Herbert presents to [facility]  Patient says she has had urinary frequency, burning and whole 9 yards for a week now. States her hemorrhoids bothering her too says she think it stopping her bowels from moving. States her stools not diarrhea  But loose. States she uses fleet suppositories about everyday. States  did banding twice but it did not help very long. She has also had them surgically removed. Discussed referral to  to discuss surgical options. She has an appt w/ in Aug to discuss colonoscopy.     Patient states vaginal cream is not working for her says she has vaginal pain about 8-9 on 0-10 scale.  States when she is out of the estradiol. States she has burning with urination that has been going on for over a month-states she cannot sleep at night. States she uses zinc oxide which helps some-states she uses it for the hemorrhoids as well. Her U/A was normal in the office today.  removed the urethral caruncle a few months ago-she had been using vasoline as well which helps as well but does not take care of the pain.     States her back pain also makes it difficult to sleep-reports muscle spasms. She has been taking otc muscle relaxers-states she takes 2 tabs every night and occ wakes up an has to take 2 more. She has seen  2 yrs ago and was told she was not a surgical candidate.     htn-she brought a b/p log to the office today-her b/p is running mostly 120's70's.     Admits fatigue-states her pacemaker was turned down last visit-her pulse is running 60-70's.       She  has a past medical history of Anemia, Anxiety, Arthritis, Back pain, Cramps, muscle, general, Degenerative lumbar disc (05/04/2014), Difficulty in swallowing, Dizziness, Dysuria (05/24/2019), EKG abnormality (08/12/2016), Fatigue, Headache, Hearing loss, Hyperlipidemia,  "Hypertension, essential, Hypothyroidism, Imbalance, Incontinence in female, Ingrowing toenail, Insomnia, Iron deficiency anemia (02/01/2017), Joint pain, Leg pain, Lightheadedness (03/14/2018), Low back pain (06/09/2014), Lumbar degenerative disc disease (02/01/2017), Macular degeneration, Mitral valve disorder, Mitral valve replaced (12/07/2017), Nocturia (05/24/2019), Pacemaker (02/23/2018), Peptic ulcer disease (02/01/2017), Ringing in ear, Sciatica (01/09/2015), Shortness of breath, Urethral caruncle (05/24/2019), Vision changes, and Weakness.     Objective   Vital Signs:   /73 (BP Location: Left arm, Patient Position: Sitting, Cuff Size: Pediatric)   Pulse 68   Ht 157.5 cm (62\")   Wt 48.1 kg (106 lb)   SpO2 97%   BMI 19.39 kg/m²     Physical Exam  Constitutional:       Appearance: Normal appearance.   Neck:      Thyroid: No thyroid mass, thyromegaly or thyroid tenderness.   Cardiovascular:      Rate and Rhythm: Rhythm irregular.      Pulses: Normal pulses.   Pulmonary:      Effort: Pulmonary effort is normal.      Breath sounds: Normal breath sounds.   Musculoskeletal:      Right lower leg: No edema.      Left lower leg: No edema.   Skin:     General: Skin is warm and dry.   Neurological:      General: No focal deficit present.      Mental Status: She is alert and oriented to person, place, and time.   Psychiatric:         Mood and Affect: Mood normal.         Behavior: Behavior normal.        Result Review :   The following data was reviewed by: JACKLYN Cedillo on 07/20/2021:    Data reviewed: Radiologic studies lumbar xray   REASON FOR EXAM:   REASON FOR VISIT: HIP AND BACK PAIN       --------------------------------------------------------------------------------------------------------------------  PROCEDURE: LS-SPINE W/OBLIQUE MINIMUM OF 4 VIEWS         [facility] AP & LAT, 10/07/2015, 11:13.         INDICATIONS: LOW BACK PAIN, RIGHT " HIP PAIN. FEELS LIKE HIP CATCHES WHEN CHANGING POSTIONS.         FINDINGS:     There is moderate right convexity mid lumbar scoliosis.  A 0.6 cm retrolisthesis is noted of L1 on     L2 and L2 on L3.  The bones appear diffusely osteopenic.  No acute fracture is seen.  Moderate to     severe degenerative disc changes are noted throughout the lumbar spine.  Moderate to severe facet     arthropathy is noted bilaterally in the mid and lower lumbar spine.         CONCLUSION:     1. Moderate right convexity mid lumbar scoliosis    2. Mild malalignments at L1-2 and L2-3, similar to previous studies.    3. Diffuse osteopenia    4. Moderate to severe multilevel degenerative disc disease and facet arthropathy          Ruslan Jarrell M.D.           Electronically Signed and Approved By: Ruslan Jarrell M.D. on 9/20/2019 at 13:23               Assessment and Plan    Diagnoses and all orders for this visit:    1. Urinary tract infection without hematuria, site unspecified (Primary)  Comments:  U/A negative in the office today-sent off for culture  Orders:  -     POC Urinalysis Dipstick, Automated  -     Urine Culture - Urine, Urine, Clean Catch; Future  -     Urine Culture - Urine, Urine, Clean Catch    2. Constipation, unspecified constipation type  Comments:  she is using fleet enema daily for constipation symptoms  Orders:  -     Ambulatory Referral to General Surgery    3. Hemorrhoids, unspecified hemorrhoid type  Comments:  referreed to  for surgical removal of hemorrhoids.   Orders:  -     Ambulatory Referral to General Surgery    4. Pruritus  -     hydrOXYzine (ATARAX) 25 MG tablet; Take 1 tab PO bid prn itching  Dispense: 60 tablet; Refill: 2    5. Atrophic vaginitis  -     Discontinue: estradiol (ESTRACE) 0.1 MG/GM vaginal cream; Insert 1 g into the vagina 3 (Three) Times a Week if Needed (vaginal irritation).  Dispense: 42.5 g; Refill: 3    6. Dysuria    7. Chronic bilateral low back pain with bilateral  sciatica  Comments:  she is using lidocaine patches daily for chronic back pain.    8. Essential hypertension  Comments:  b/p stable on current regimen    9. Macular degeneration of both eyes, unspecified type  Comments:  she has an appt w/ in [place] for special glasses for her MD.     10. Hypothyroidism, unspecified type  Comments:  she was started on Levothyroxine 50mcg qd states she cannot tell a difference.         Follow Up   Return in about 3 months (around 10/20/2021).  Patient was given instructions and counseling regarding her condition or for health maintenance advice. Please see specific information pulled into the AVS if appropriate.     Fabiana Herbert  reports that she has never smoked. She has never used smokeless tobacco..

## 2021-07-21 LAB — BACTERIA SPEC AEROBE CULT: NO GROWTH

## 2021-07-22 ENCOUNTER — TELEPHONE (OUTPATIENT)
Dept: FAMILY MEDICINE CLINIC | Facility: CLINIC | Age: 86
End: 2021-07-22

## 2021-07-22 DIAGNOSIS — N95.2 ATROPHIC VAGINITIS: ICD-10-CM

## 2021-07-22 RX ORDER — ESTRADIOL 0.1 MG/G
1 CREAM VAGINAL DAILY
Qty: 42.5 G | Refills: 3 | Status: SHIPPED | OUTPATIENT
Start: 2021-07-22 | End: 2022-02-21 | Stop reason: SDUPTHER

## 2021-07-22 NOTE — TELEPHONE ENCOUNTER
Pharmacy Name:  [id number] - [place], [place] - [address] AT [facility] [phone] [facility] [phone] [facility] (Pharmacy)    Pharmacy representative name: KATHARINE    Pharmacy representative phone number: [phone]    What medication are you calling in regards to: estradiol (ESTRACE) 0.1 MG/GM vaginal cream    What question does the pharmacy have: DIRECTIONS CURRENTLY SAY 3X A WEEK BUT PATIENT HAS BEEN USING EVERYDAY. PATIENT IS ALREADY OUT OF HER 90 DAY SUPPLY AND THE INSURANCE WILL NO LONGER PAY THE MEDICATION. PHARMACY IS INQUIRING IF THE DIRECTIONS CAN BE CHANGED TO EVER DAY SO THE PATIENTS MEDICATION WILL BE COVERED.    Who is the provider that prescribed the medication: ALY HOLGUIN     PLEASE ADVISE.

## 2021-07-27 ENCOUNTER — OFFICE VISIT (OUTPATIENT)
Dept: SURGERY | Facility: CLINIC | Age: 86
End: 2021-07-27

## 2021-07-27 VITALS — BODY MASS INDEX: 19.21 KG/M2 | HEIGHT: 62 IN | WEIGHT: 104.4 LBS | RESPIRATION RATE: 14 BRPM

## 2021-07-27 DIAGNOSIS — K64.9 HEMORRHOIDS, UNSPECIFIED HEMORRHOID TYPE: Primary | ICD-10-CM

## 2021-07-27 PROCEDURE — 99213 OFFICE O/P EST LOW 20 MIN: CPT | Performed by: SURGERY

## 2021-07-27 NOTE — PROGRESS NOTES
Chief Complaint  Hemorrhoids (hx of hemorrhoidectomy and banding)    Subjective          Fabiana Herbert presents to [facility]  History of Present Illness    Fabiana Herbert is a 86 y.o. female  who presents today for a postoperative visit.     Patient is here for a follow-up for her hemorrhoids.  She has had longstanding trouble with hemorrhoids.  She has had a hemorrhoidectomy by Dr. Mcmillan in the past and I believe  I have also done 2 hemorrhoid banding's for her.  She is having recurrent symptoms there and has tried a suppository with hydrocortisone and lidocaine with good result.    Past History:  Medical History: has a past medical history of Anemia, Anxiety, Arthritis, Back pain, Cramps, muscle, general, Degenerative lumbar disc (05/04/2014), Difficulty in swallowing, Dizziness, Dysuria (05/24/2019), EKG abnormality (08/12/2016), Fatigue, Headache, Hearing loss, Hyperlipidemia, Hypertension, essential, Hypothyroidism, Imbalance, Incontinence in female, Ingrowing toenail, Insomnia, Iron deficiency anemia (02/01/2017), Joint pain, Leg pain, Lightheadedness (03/14/2018), Low back pain (06/09/2014), Lumbar degenerative disc disease (02/01/2017), Macular degeneration, Mitral valve disorder, Mitral valve replaced (12/07/2017), Nocturia (05/24/2019), Pacemaker (02/23/2018), Peptic ulcer disease (02/01/2017), Ringing in ear, Sciatica (01/09/2015), Shortness of breath, Urethral caruncle (05/24/2019), Vision changes, and Weakness.   Surgical History: has a past surgical history that includes Appendectomy (1951); Breast biopsy (Left, 1955); Cataract extraction w/  intraocular lens implant (Bilateral); Colon surgery (2018); Cystoscopy (02/18/2020); Esophagogastroduodenoscopy (2018); Cystoscopy (02/18/2020); Hemorrhoid surgery; Hysterectomy (1960); Lumbar laminectomy (07/2007); Mitral valve replacement (11/17/2016); Pacemaker Implantation (11/2016); and Wrist surgery (Left).   Family History:  family history includes Diabetes in her father and sister; Heart disease in her father, sister, and other family members.   Social History: reports that she has never smoked. She has never used smokeless tobacco. She reports that she does not drink alcohol and does not use drugs.  Allergies: Aspirin, Ciprofloxacin, Duloxetine hcl, Ibandronic acid, Levofloxacin, Metronidazole, Nitrofurantoin, Nortriptyline hcl, Pregabalin, Ranitidine hcl, Sulfamethoxazole-trimethoprim, Tramadol, and Zolpidem tartrate       Current Outpatient Medications:   •  Bacillus Coagulans-Inulin (Probiotic) 1-250 BILLION-MG capsule, Probiotic 15 billion cell oral capsule take 2 capsules by oral route daily   Active, Disp: , Rfl:   •  Bystolic 10 MG tablet, Take 10 mg by mouth every night at bedtime., Disp: , Rfl:   •  docusate sodium 100 MG capsule, Stool Softener 100 mg oral capsule take 1 capsule (100 mg) by oral route once daily   Suspended, Disp: , Rfl:   •  esomeprazole (nexIUM) 20 MG capsule, TAKE 1 CAPSULE BY MOUTH ONCE DAILY AT LEAST 1 HOUR BEFORE A MEAL, Disp: , Rfl:   •  estradiol (ESTRACE) 0.1 MG/GM vaginal cream, Insert 1 g into the vagina Daily., Disp: 42.5 g, Rfl: 3  •  famotidine (PEPCID) 20 MG tablet, , Disp: , Rfl:   •  hydroCHLOROthiazide (MICROZIDE) 12.5 MG capsule, TAKE ONE CAPSULE BY MOUTH EVERY DAY, Disp: 90 capsule, Rfl: 1  •  hydrocortisone 2.5 % cream, Apply  topically to the appropriate area as directed 2 (Two) Times a Day. hc 2.5%/lod 2% rectal rocket-Insert 1 rocket rectally once day as needed, Disp: , Rfl:   •  hydrOXYzine (ATARAX) 25 MG tablet, Take 1 tab PO bid prn itching, Disp: 60 tablet, Rfl: 2  •  levothyroxine (SYNTHROID, LEVOTHROID) 50 MCG tablet, , Disp: , Rfl:   •  lidocaine (LIDODERM) 5 %, Place 2 patches on the skin as directed by provider Daily., Disp: , Rfl:   •  LORazepam (ATIVAN) 0.5 MG tablet, Take 1 tablet by mouth Daily As Needed for Anxiety., Disp: 90 tablet, Rfl: 0  •  losartan (COZAAR) 25 MG  "tablet, Take 25 mg by mouth Daily., Disp: , Rfl:   •  Multiple Vitamins-Minerals (VISION FORMULA 2 PO), Vision Formula (with lutein) 1,000 unit-200 mg-60 unit-2 mg oral tablet take 1 tablet by oral route QD (OTC)   Active, Disp: , Rfl:   •  mupirocin (BACTROBAN) 2 % ointment, APPLY SMALL AMOUNT TOPICALLY TO THE AFFECTED AREA THREE TIMES DAILY, Disp: , Rfl:   •  ondansetron (ZOFRAN) 4 MG tablet, Take 1 tablet by mouth Every 8 (Eight) Hours As Needed for Nausea or Vomiting., Disp: 30 tablet, Rfl: 2  •  polyethylene glycol (MiraLax) 17 GM/SCOOP powder, 17 g., Disp: , Rfl:   •  spironolactone (ALDACTONE) 25 MG tablet, , Disp: , Rfl:        Physical Exam  He appears well her abdomen is soft  Objective     Vital Signs:   Resp 14   Ht 157.5 cm (62\")   Wt 47.4 kg (104 lb 6.4 oz)   BMI 19.10 kg/m²              Assessment and Plan    Diagnoses and all orders for this visit:    1. Hemorrhoids, unspecified hemorrhoid type (Primary)    We will prescribe her some hydrocortisone ointment and see if that will help I have asked her to call me back if she were to have more trouble.      "

## 2021-08-04 NOTE — TELEPHONE ENCOUNTER
Patient called and said she needs a refill on the rocket rectal suppositories please, send to [facility]. 2%. Can she get 6 at a time please. Pharmacy correct in chart for you.

## 2021-08-06 NOTE — TELEPHONE ENCOUNTER
Epic was not able to identify this medication (due to being a compounded Rx) in order to refill electronically. Per  I was asked to call this prescription in. I called and spoke to pharmacist at [facility] and okayed them to refill her 'Rectal Rockets' #4  Per .

## 2021-08-18 ENCOUNTER — TELEPHONE (OUTPATIENT)
Dept: FAMILY MEDICINE CLINIC | Facility: CLINIC | Age: 86
End: 2021-08-18

## 2021-08-18 ENCOUNTER — OFFICE VISIT (OUTPATIENT)
Dept: FAMILY MEDICINE CLINIC | Facility: CLINIC | Age: 86
End: 2021-08-18

## 2021-08-18 VITALS
BODY MASS INDEX: 19.95 KG/M2 | HEART RATE: 66 BPM | DIASTOLIC BLOOD PRESSURE: 78 MMHG | HEIGHT: 62 IN | OXYGEN SATURATION: 98 % | SYSTOLIC BLOOD PRESSURE: 145 MMHG | RESPIRATION RATE: 16 BRPM | TEMPERATURE: 97.1 F | WEIGHT: 108.4 LBS

## 2021-08-18 DIAGNOSIS — F41.9 ANXIETY: ICD-10-CM

## 2021-08-18 DIAGNOSIS — K64.9 HEMORRHOIDS, UNSPECIFIED HEMORRHOID TYPE: Primary | ICD-10-CM

## 2021-08-18 DIAGNOSIS — R11.0 NAUSEA: ICD-10-CM

## 2021-08-18 DIAGNOSIS — R30.0 DYSURIA: ICD-10-CM

## 2021-08-18 LAB
BILIRUB BLD-MCNC: NEGATIVE MG/DL
CLARITY, POC: CLEAR
COLOR UR: YELLOW
GLUCOSE UR STRIP-MCNC: NEGATIVE MG/DL
KETONES UR QL: NEGATIVE
LEUKOCYTE EST, POC: NEGATIVE
NITRITE UR-MCNC: NEGATIVE MG/ML
PH UR: 6 [PH] (ref 5–8)
PROT UR STRIP-MCNC: NEGATIVE MG/DL
RBC # UR STRIP: NEGATIVE /UL
SP GR UR: 1.02 (ref 1–1.03)
UROBILINOGEN UR QL: NORMAL

## 2021-08-18 PROCEDURE — 99213 OFFICE O/P EST LOW 20 MIN: CPT | Performed by: NURSE PRACTITIONER

## 2021-08-18 PROCEDURE — 81003 URINALYSIS AUTO W/O SCOPE: CPT | Performed by: NURSE PRACTITIONER

## 2021-08-18 RX ORDER — LORAZEPAM 0.5 MG/1
0.5 TABLET ORAL DAILY PRN
Qty: 90 TABLET | Refills: 0 | Status: SHIPPED | OUTPATIENT
Start: 2021-08-18 | End: 2021-08-18 | Stop reason: SDUPTHER

## 2021-08-18 RX ORDER — LORAZEPAM 0.5 MG/1
0.5 TABLET ORAL DAILY PRN
Qty: 90 TABLET | Refills: 0 | Status: SHIPPED | OUTPATIENT
Start: 2021-08-18 | End: 2021-09-20

## 2021-08-18 RX ORDER — LORAZEPAM 0.5 MG/1
TABLET ORAL
Qty: 90 TABLET | Refills: 0 | OUTPATIENT
Start: 2021-08-18

## 2021-08-18 NOTE — TELEPHONE ENCOUNTER
Caller: Fabiana Herbert    Relationship: Self    Best call back number: [phone]    What was the call regarding: PATIENT CALLED STATING THAT SHE MISSED A PHONE CALL FROM THE OFFICE. SHE STATED SHE BELIEVES IT WAS IN REGARDS TO HER TEST RESULTS. PLEASE ADVISE     Do you require a callback:YES

## 2021-08-18 NOTE — PROGRESS NOTES
Chief Complaint  No chief complaint on file.    Subjective          Fabiana Herbert presents to [facility]  History of Present Illness     States she is still having vaginal burning-she has been seeing -she is using estradiol 2-3 times per day and vasoline for the burning-states the vasoline helps more than anything.    Hemorrhoids-states  wrote her some rocket suppositories-states they help but they are $15.00 each-they are compounded at [facility]. States when she saw  he ordered hydrocortisone.     Nausea-she has an appt w/ on 8/31/21-states she is still having trouble w/nausea. Hx of bleeding ulcer after her heart surgery  (pacemaker) 4 years ago. She takes zofran as needed for nausea. Admits hx of diverticulitis in 1970s-he removed 18 inches of her colon at that time. She feels her stomach pain is r/t diverticulitis. She takes Mirilax and stool softner to keep her bowels moving. States the last 4-5 days she is having diarrhea. She is taking an otc antidiarrhea medication that helps. States the stools are soft but not liquid. States the stool is dark black. Denies rectal bleeding. She feels the stool should be more firm. Discussed need for colonoscopy and EGD-she can discuss this with  at her appt on 8/31/21. She had a colonoscopy on 10/1/20 w/hemorrhoid banding. States her colon looked normal w/exception of hemorrhoids.     Anxiety -under good control w/current dose of lorazepam. [place] and UDS up to date.    Back pain-states she has ruptured disc in her back-she is taking tylenol and lidocaine patches. She is not a surgical candidate. She had epidural 3 different times that did not help.     She saw  to check her pacemaker-he reduced the speed of the pacemaker states she has not felt as well since he reduced the speed. States she is having more palpitations. She has an appt to discuss this with him.        She  has a past  "medical history of Anemia, Anxiety, Arthritis, Back pain, Cramps, muscle, general, Degenerative lumbar disc (05/04/2014), Difficulty in swallowing, Dizziness, Dysuria (05/24/2019), EKG abnormality (08/12/2016), Fatigue, Headache, Hearing loss, Hyperlipidemia, Hypertension, essential, Hypothyroidism, Imbalance, Incontinence in female, Ingrowing toenail, Insomnia, Iron deficiency anemia (02/01/2017), Joint pain, Leg pain, Lightheadedness (03/14/2018), Low back pain (06/09/2014), Lumbar degenerative disc disease (02/01/2017), Macular degeneration, Mitral valve disorder, Mitral valve replaced (12/07/2017), Nocturia (05/24/2019), Pacemaker (02/23/2018), Peptic ulcer disease (02/01/2017), Ringing in ear, Sciatica (01/09/2015), Shortness of breath, Urethral caruncle (05/24/2019), Vision changes, and Weakness.     Objective   Vital Signs:   /78 (BP Location: Left arm, Patient Position: Sitting, Cuff Size: Adult)   Pulse 66   Temp 97.1 °F (36.2 °C) (Oral)   Resp 16   Ht 157.5 cm (62\")   Wt 49.2 kg (108 lb 6.4 oz)   SpO2 98%   BMI 19.83 kg/m²     Physical Exam  Constitutional:       Appearance: Normal appearance.   Neck:      Thyroid: No thyroid mass, thyromegaly or thyroid tenderness.   Cardiovascular:      Rate and Rhythm: Normal rate and regular rhythm.      Pulses: Normal pulses.      Heart sounds: Normal heart sounds.   Pulmonary:      Effort: Pulmonary effort is normal.      Breath sounds: Normal breath sounds.   Musculoskeletal:      Right lower leg: No edema.      Left lower leg: No edema.   Neurological:      Mental Status: She is alert.        Result Review :            Assessment and Plan    Diagnoses and all orders for this visit:    1. Hemorrhoids, unspecified hemorrhoid type (Primary)  Comments:  rocket suppositories called into [facility]    2. Anxiety  Comments:  reports her anxiety is under good control w/current dose of Lorazepam-refilled today.  Orders:  -     LORazepam (ATIVAN) 0.5 MG " tablet; Take 1 tablet by mouth Daily As Needed for Anxiety.  Dispense: 90 tablet; Refill: 0    3. Nausea  Comments:  she has an appt w/ on 8/31/21-discussed need for EGD/colonoscopy due to nausea and diarrhea    4. Dysuria  Comments:  U/A negative in the office today-she is using estradiol and vasoline for the vaginal irritation-instructed to take azo prn  Orders:  -     POCT urinalysis dipstick, automated  -     POCT urinalysis dipstick, automated        Follow Up   Return in about 3 months (around 11/18/2021).  Patient was given instructions and counseling regarding her condition or for health maintenance advice. Please see specific information pulled into the AVS if appropriate.     Fabiana PEGGY Herbert  reports that she has never smoked. She has never used smokeless tobacco..

## 2021-08-19 ENCOUNTER — TELEPHONE (OUTPATIENT)
Dept: FAMILY MEDICINE CLINIC | Facility: CLINIC | Age: 86
End: 2021-08-19

## 2021-08-24 RX ORDER — LIDOCAINE 50 MG/G
PATCH TOPICAL
Qty: 60 PATCH | Refills: 1 | Status: SHIPPED | OUTPATIENT
Start: 2021-08-24 | End: 2022-01-05 | Stop reason: SDUPTHER

## 2021-08-31 ENCOUNTER — OFFICE VISIT (OUTPATIENT)
Dept: GASTROENTEROLOGY | Facility: CLINIC | Age: 86
End: 2021-08-31

## 2021-08-31 VITALS
DIASTOLIC BLOOD PRESSURE: 81 MMHG | SYSTOLIC BLOOD PRESSURE: 148 MMHG | HEIGHT: 62 IN | BODY MASS INDEX: 19.88 KG/M2 | OXYGEN SATURATION: 100 % | HEART RATE: 69 BPM | WEIGHT: 108 LBS

## 2021-08-31 DIAGNOSIS — K59.00 CONSTIPATION, UNSPECIFIED CONSTIPATION TYPE: Primary | ICD-10-CM

## 2021-08-31 DIAGNOSIS — R13.12 OROPHARYNGEAL DYSPHAGIA: ICD-10-CM

## 2021-08-31 DIAGNOSIS — K64.9 HEMORRHOIDS, UNSPECIFIED HEMORRHOID TYPE: ICD-10-CM

## 2021-08-31 PROCEDURE — 99214 OFFICE O/P EST MOD 30 MIN: CPT | Performed by: NURSE PRACTITIONER

## 2021-08-31 RX ORDER — NEBIVOLOL HYDROCHLORIDE 10 MG/1
TABLET ORAL
Qty: 30 TABLET | Refills: 7 | Status: SHIPPED | OUTPATIENT
Start: 2021-08-31 | End: 2022-01-31

## 2021-08-31 RX ORDER — PANTOPRAZOLE SODIUM 40 MG/1
40 TABLET, DELAYED RELEASE ORAL DAILY
Qty: 30 TABLET | Refills: 3 | Status: SHIPPED | OUTPATIENT
Start: 2021-08-31 | End: 2022-02-15

## 2021-08-31 NOTE — PATIENT INSTRUCTIONS
Constipation, Adult  Constipation is when a person has fewer than three bowel movements in a week, has difficulty having a bowel movement, or has stools (feces) that are dry, hard, or larger than normal. Constipation may be caused by an underlying condition. It may become worse with age if a person takes certain medicines and does not take in enough fluids.  Follow these instructions at home:  Eating and drinking    · Eat foods that have a lot of fiber, such as beans, whole grains, and fresh fruits and vegetables.  · Limit foods that are low in fiber and high in fat and processed sugars, such as fried or sweet foods. These include french fries, hamburgers, cookies, candies, and soda.  · Drink enough fluid to keep your urine pale yellow.  General instructions  · Exercise regularly or as told by your health care provider. Try to do 150 minutes of moderate exercise each week.  · Use the bathroom when you have the urge to go. Do not hold it in.  · Take over-the-counter and prescription medicines only as told by your health care provider. This includes any fiber supplements.  · During bowel movements:  ? Practice deep breathing while relaxing the lower abdomen.  ? Practice pelvic floor relaxation.  · Watch your condition for any changes. Let your health care provider know about them.  · Keep all follow-up visits as told by your health care provider. This is important.  Contact a health care provider if:  · You have pain that gets worse.  · You have a fever.  · You do not have a bowel movement after 4 days.  · You vomit.  · You are not hungry or you lose weight.  · You are bleeding from the opening between the buttocks (anus).  · You have thin, pencil-like stools.  Get help right away if:  · You have a fever and your symptoms suddenly get worse.  · You leak stool or have blood in your stool.  · Your abdomen is bloated.  · You have severe pain in your abdomen.  · You feel dizzy or you faint.  Summary  · Constipation is  when a person has fewer than three bowel movements in a week, has difficulty having a bowel movement, or has stools (feces) that are dry, hard, or larger than normal.  · Eat foods that have a lot of fiber, such as beans, whole grains, and fresh fruits and vegetables.  · Drink enough fluid to keep your urine pale yellow.  · Take over-the-counter and prescription medicines only as told by your health care provider. This includes any fiber supplements.  This information is not intended to replace advice given to you by your health care provider. Make sure you discuss any questions you have with your health care provider.  Document Revised: 11/04/2020 Document Reviewed: 11/04/2020  Elsevier Patient Education © 2021 [employer]

## 2021-08-31 NOTE — PROGRESS NOTES
Chief Complaint  Abdominal Pain, Difficulty Swallowing, and Constipation    Fabiana Herbert is a 86 y.o. female who presents to [facility] as a new patient.    HPI  86-year-old female presenting the office as a new patient with a history of abdominal pain, difficulty swallowing and constipation.  Patient is currently on Nexium 20 mg daily, docusate sodium 100 mg daily, and MiraLAX.  Patient reports that she still struggles with hemorrhoids spite a hemorrhoidectomy and 2 previous banding.  She uses an external cream provided by Dr. Rios that is working well for her hemorrhoids.  Patient reports she continues to struggle with constipation despite use of MiraLAX and stool softener.  She has chronic back problems with 3 ruptured disc which she feels is contributing to her bowel habits.  She has extensive diverticular disease throughout the colon.  She avoids things that irritate her abdomen including spicy foods.  Patient feels like her reflux is not well controlled on Nexium 20 mg daily.  She does have some dysphagia that she feels is related to her thyroid gland being enlarged.  She reports this is only occasional with solid foods.  Patient denies fever, nausea, vomiting, weight loss, night sweats, melena, hematochezia, hematemesis.    Endoscopy: Review of the patient's most recent EGD and colonoscopy performed by Dr. Alonso 3/19/2018 revealed erythema in the stomach with a normal esophagus and duodenum.  Diverticula in the sigmoid colon and ascending colon of moderate severity.  Previous side to side ileocolonic anastomosis in the ascending colon.    Sigmoidoscopy performed by Dr. Rios 10/1/2020 performed due to perianal pain and hemorrhoids extensive sigmoid colon diverticulosis without evidence of diverticulitis was seen.  No polyps or masses.  1 moderate sized hemorrhoid pile in the right posterior lateral position with banding.    CT scan soft tissue of the neck without  contrast 11/25/2020 no definitive abnormalities along the left lateral neck no acute processes.    Result Review :   The following data was reviewed by: Sherry Buckley NP on 08/31/2021 for  History of Present Illness           Past Medical History:   Diagnosis Date   • Anemia    • Anxiety    • Arthritis     spine   • Back pain    • Cramps, muscle, general    • Degenerative lumbar disc 05/04/2014    LUMBAR /LS DISC DEGENERATION   • Difficulty in swallowing    • Dizziness    • Dysuria 05/24/2019   • EKG abnormality 08/12/2016   • Fatigue    • Headache    • Hearing loss    • Hyperlipidemia    • Hypertension, essential    • Hypothyroidism    • Imbalance    • Incontinence in female    • Ingrowing toenail    • Insomnia    • Iron deficiency anemia 02/01/2017   • Joint pain    • Leg pain    • Lightheadedness 03/14/2018    The patient reports onset of lightheadedness following heart surgery in 2016. She notes that this tends to be brought on by a change in position. I would consider orthostasis as a potential culprit. I will attempt to pursue orthostatic blood pressures but the patient notes that it is hard for her to lay flaat on the exam table. I will request her records be sent for my review.    • Low back pain 06/09/2014   • Lumbar degenerative disc disease 02/01/2017   • Macular degeneration     Dharmesh   • Mitral valve disorder    • Mitral valve replaced 12/07/2017   • Nocturia 05/24/2019   • Pacemaker 02/23/2018   • Peptic ulcer disease 02/01/2017   • Ringing in ear    • Sciatica 01/09/2015   • Shortness of breath    • Urethral caruncle 05/24/2019   • Vision changes    • Weakness        Past Surgical History:   Procedure Laterality Date   • APPENDECTOMY  1951   • BREAST BIOPSY Left 1955   • CATARACT EXTRACTION WITH INTRAOCULAR LENS IMPLANT Bilateral     1992, 2001   • COLON SURGERY  2018    Dr. Alonso, Moderate Diverticulosis   • COLONOSCOPY     • CYSTOSCOPY  02/18/2020    excision of urethral prolapse w/   Marcelle   • CYSTOSCOPY  02/18/2020    Excision of Urethral Prolapse w/ Dr. Ibanez   • ENDOSCOPY  2018   • HEMORRHOIDECTOMY      2009/ 2014 hemorrhoid banding    • HYSTERECTOMY  1960    total    • LUMBAR LAMINECTOMY  07/2007   • MITRAL VALVE REPLACEMENT  11/17/2016    Dr. Singh in [place]   • PACEMAKER IMPLANTATION  11/2016    MRI compatible   • WRIST SURGERY Left          Current Outpatient Medications:   •  Bacillus Coagulans-Inulin (Probiotic) 1-250 BILLION-MG capsule, Probiotic 15 billion cell oral capsule take 2 capsules by oral route daily   Active, Disp: , Rfl:   •  Bystolic 10 MG tablet, TAKE 1 TABLET BY MOUTH EVERY NIGHT AT BEDTIME, Disp: 30 tablet, Rfl: 7  •  docusate sodium 100 MG capsule, Stool Softener 100 mg oral capsule take 1 capsule (100 mg) by oral route once daily   Suspended, Disp: , Rfl:   •  estradiol (ESTRACE) 0.1 MG/GM vaginal cream, Insert 1 g into the vagina Daily., Disp: 42.5 g, Rfl: 3  •  famotidine (PEPCID) 20 MG tablet, , Disp: , Rfl:   •  Glycerin, Laxative, (ADULT SUPPOSITORY RE), Insert 4 suppositories into the rectum Daily As Needed. Hydrocortisone acetate powder 0.576 g 2.5% Lidocaine powder 0.461 g 2% Silica gel powder 0.79 g Mineral oil light oil 0.666 mL pegblend supp base oint 30 g, Disp: , Rfl:   •  hydroCHLOROthiazide (MICROZIDE) 12.5 MG capsule, TAKE ONE CAPSULE BY MOUTH EVERY DAY, Disp: 90 capsule, Rfl: 1  •  hydrOXYzine (ATARAX) 25 MG tablet, Take 1 tab PO bid prn itching, Disp: 60 tablet, Rfl: 2  •  levothyroxine (SYNTHROID, LEVOTHROID) 50 MCG tablet, , Disp: , Rfl:   •  lidocaine (LIDODERM) 5 %, APPLY 2 PATCHES EXTERNALLY TO THE SKIN EVERY DAY. MAY WEAR UP TO 12 HOURS, Disp: 60 patch, Rfl: 1  •  LORazepam (ATIVAN) 0.5 MG tablet, Take 1 tablet by mouth Daily As Needed for Anxiety., Disp: 90 tablet, Rfl: 0  •  losartan (COZAAR) 25 MG tablet, Take 25 mg by mouth Daily., Disp: , Rfl:   •  Multiple Vitamins-Minerals (VISION FORMULA 2 PO), Vision Formula (with lutein) 1,000  "unit-200 mg-60 unit-2 mg oral tablet take 1 tablet by oral route QD (OTC)   Active, Disp: , Rfl:   •  mupirocin (BACTROBAN) 2 % ointment, APPLY SMALL AMOUNT TOPICALLY TO THE AFFECTED AREA THREE TIMES DAILY, Disp: 22 g, Rfl: 1  •  ondansetron (ZOFRAN) 4 MG tablet, Take 1 tablet by mouth Every 8 (Eight) Hours As Needed for Nausea or Vomiting., Disp: 30 tablet, Rfl: 2  •  polyethylene glycol (MiraLax) 17 GM/SCOOP powder, 17 g., Disp: , Rfl:   •  spironolactone (ALDACTONE) 25 MG tablet, , Disp: , Rfl:   •  linaclotide (Linzess) 72 MCG capsule capsule, Take 1 capsule by mouth Every Morning Before Breakfast., Disp: 30 capsule, Rfl: 2  •  pantoprazole (PROTONIX) 40 MG EC tablet, Take 1 tablet by mouth Daily., Disp: 30 tablet, Rfl: 3     Allergies   Allergen Reactions   • Aspirin Unknown - Low Severity   • Ciprofloxacin Diarrhea and Unknown - High Severity   • Duloxetine Hcl GI Intolerance   • Ibandronic Acid Unknown - Low Severity   • Levofloxacin Mental Status Change   • Metronidazole Unknown - Low Severity   • Nitrofurantoin Unknown - Low Severity   • Nortriptyline Hcl Delirium   • Pregabalin Unknown - Low Severity   • Ranitidine Hcl Unknown - Low Severity   • Sulfamethoxazole-Trimethoprim Unknown - Low Severity   • Tramadol Unknown - Low Severity   • Zolpidem Tartrate Other (See Comments)       Family History   Problem Relation Age of Onset   • Heart disease Father    • Diabetes Father    • Heart disease Sister    • Diabetes Sister    • Heart disease Other    • Heart disease Other         Social History     Social History Narrative   • Not on file       Objective     Vital Signs:   /81 (BP Location: Left arm, Patient Position: Sitting, Cuff Size: Adult)   Pulse 69   Ht 157.5 cm (62\")   Wt 49 kg (108 lb)   SpO2 100%   BMI 19.75 kg/m²     Body mass index is 19.75 kg/m².    Physical Exam  Constitutional:       General: She is not in acute distress.     Appearance: Normal appearance. She is well-developed and " normal weight.   Eyes:      Conjunctiva/sclera: Conjunctivae normal.      Pupils: Pupils are equal, round, and reactive to light.      Visual Fields: Right eye visual fields normal and left eye visual fields normal.   Cardiovascular:      Rate and Rhythm: Normal rate and regular rhythm.      Heart sounds: Normal heart sounds.   Pulmonary:      Effort: Pulmonary effort is normal. No retractions.      Breath sounds: Normal breath sounds and air entry.      Comments: Inspection of chest: normal appearance  Abdominal:      General: Bowel sounds are normal.      Palpations: Abdomen is soft.      Tenderness: There is no abdominal tenderness.      Comments: No appreciable hepatosplenomegaly   Musculoskeletal:      Cervical back: Neck supple.      Right lower leg: No edema.      Left lower leg: No edema.   Lymphadenopathy:      Cervical: No cervical adenopathy.   Skin:     Findings: No lesion.      Comments: Turgor normal   Neurological:      Mental Status: She is alert and oriented to person, place, and time.   Psychiatric:         Mood and Affect: Mood and affect normal.                 Assessment and Plan    Diagnoses and all orders for this visit:    1. Constipation, unspecified constipation type (Primary)    2. Hemorrhoids, unspecified hemorrhoid type    3. Oropharyngeal dysphagia    Other orders  -     linaclotide (Linzess) 72 MCG capsule capsule; Take 1 capsule by mouth Every Morning Before Breakfast.  Dispense: 30 capsule; Refill: 2  -     pantoprazole (PROTONIX) 40 MG EC tablet; Take 1 tablet by mouth Daily.  Dispense: 30 tablet; Refill: 3    86-year-old female presenting the office as a new patient with a history of abdominal pain, difficulty swallowing, hemorrhoids and constipation.  We will trial Linzess 72 mcg daily to see if this helps alleviate constipation.  I educated patient to cease use of stool softeners and MiraLAX when beginning the medication.  Educated the patient to take this 30 minutes before  breakfast.  I have changed her reflux medicine from Nexium to Protonix 40 mg daily to see if this helps improve her reflux.  We discussed an EGD due to her dysphagia, we will trial changing her reflux medication.  Patient will follow up with primary care to evaluate her thyroid.  We will follow up in the office in 2 months and consider EGD at that time.  Patient to call with any questions or concerns.  Patient agreeable to this plan.            Follow Up   Return in about 2 months (around 10/31/2021).  Patient was given instructions and counseling regarding her condition or for health maintenance advice. Please see specific information pulled into the AVS if appropriate.

## 2021-09-08 ENCOUNTER — TELEPHONE (OUTPATIENT)
Dept: GASTROENTEROLOGY | Facility: CLINIC | Age: 86
End: 2021-09-08

## 2021-09-08 NOTE — TELEPHONE ENCOUNTER
Patient called the office stating she has experienced severe diarrhea since beginning the Linzess. Pt requesting to add to allergy list. Patient was advised by Dr. Rios to use Miralax OTC and she said she would like to continue to take Miralax prior to trying other medications. Patient aware to call us if there are any changes in bowel movements. Linzess added to allergy list.

## 2021-09-09 DIAGNOSIS — K27.9 PEPTIC ULCER DISEASE: Primary | ICD-10-CM

## 2021-09-09 NOTE — TELEPHONE ENCOUNTER
Caller: Fabiana Herbert    Relationship: Self    Best call back number: [phone]    Medication needed:   Requested Prescriptions     Pending Prescriptions Disp Refills   • famotidine (PEPCID) 20 MG tablet         When do you need the refill by: 09/09/2021    Does the patient have less than a 3 day supply:  [x] Yes  [] No    What is the patient's preferred pharmacy: [id number] [place] [facility] [address] AT [facility] [phone] [facility] [phone] FX

## 2021-09-12 RX ORDER — FAMOTIDINE 20 MG/1
20 TABLET, FILM COATED ORAL DAILY PRN
Qty: 90 TABLET | Refills: 1 | Status: SHIPPED | OUTPATIENT
Start: 2021-09-12 | End: 2022-02-15

## 2021-09-16 DIAGNOSIS — F41.9 ANXIETY: ICD-10-CM

## 2021-09-16 NOTE — TELEPHONE ENCOUNTER
Ativan 0.5mg QD PRN, #90 no refills last filled 8/18/2021    LV: 8/18/2021  NV: 11/19/2021  UDS: 5/4/2021

## 2021-09-20 RX ORDER — LORAZEPAM 0.5 MG/1
0.5 TABLET ORAL DAILY PRN
Qty: 90 TABLET | Refills: 0 | Status: SHIPPED | OUTPATIENT
Start: 2021-09-20 | End: 2022-01-21 | Stop reason: SDUPTHER

## 2021-10-05 ENCOUNTER — TRANSCRIBE ORDERS (OUTPATIENT)
Dept: CARDIOLOGY | Facility: CLINIC | Age: 86
End: 2021-10-05

## 2021-10-05 ENCOUNTER — LAB (OUTPATIENT)
Dept: LAB | Facility: HOSPITAL | Age: 86
End: 2021-10-05

## 2021-10-05 DIAGNOSIS — I50.9 HEART FAILURE, UNSPECIFIED HF CHRONICITY, UNSPECIFIED HEART FAILURE TYPE (HCC): ICD-10-CM

## 2021-10-05 DIAGNOSIS — I50.9 HEART FAILURE, UNSPECIFIED HF CHRONICITY, UNSPECIFIED HEART FAILURE TYPE (HCC): Primary | ICD-10-CM

## 2021-10-05 DIAGNOSIS — Z79.899 ENCOUNTER FOR LONG-TERM (CURRENT) USE OF OTHER MEDICATIONS: ICD-10-CM

## 2021-10-05 LAB
ALBUMIN SERPL-MCNC: 4.4 G/DL (ref 3.5–5.2)
ALBUMIN/GLOB SERPL: 1.4 G/DL
ALP SERPL-CCNC: 55 U/L (ref 39–117)
ALT SERPL W P-5'-P-CCNC: 20 U/L (ref 1–33)
ANION GAP SERPL CALCULATED.3IONS-SCNC: 8.4 MMOL/L (ref 5–15)
AST SERPL-CCNC: 22 U/L (ref 1–32)
BILIRUB SERPL-MCNC: 0.5 MG/DL (ref 0–1.2)
BUN SERPL-MCNC: 28 MG/DL (ref 8–23)
BUN/CREAT SERPL: 22.8 (ref 7–25)
CALCIUM SPEC-SCNC: 10.3 MG/DL (ref 8.6–10.5)
CHLORIDE SERPL-SCNC: 104 MMOL/L (ref 98–107)
CHOLEST SERPL-MCNC: 141 MG/DL (ref 0–200)
CO2 SERPL-SCNC: 28.6 MMOL/L (ref 22–29)
CREAT SERPL-MCNC: 1.23 MG/DL (ref 0.57–1)
GFR SERPL CREATININE-BSD FRML MDRD: 41 ML/MIN/1.73
GLOBULIN UR ELPH-MCNC: 3.1 GM/DL
GLUCOSE SERPL-MCNC: 95 MG/DL (ref 65–99)
HDLC SERPL-MCNC: 49 MG/DL (ref 40–60)
LDLC SERPL CALC-MCNC: 63 MG/DL (ref 0–100)
LDLC/HDLC SERPL: 1.18 {RATIO}
NT-PROBNP SERPL-MCNC: 636.4 PG/ML (ref 0–1800)
POTASSIUM SERPL-SCNC: 4.3 MMOL/L (ref 3.5–5.2)
PROT SERPL-MCNC: 7.5 G/DL (ref 6–8.5)
SODIUM SERPL-SCNC: 141 MMOL/L (ref 136–145)
TRIGL SERPL-MCNC: 170 MG/DL (ref 0–150)
VLDLC SERPL-MCNC: 29 MG/DL (ref 5–40)

## 2021-10-05 PROCEDURE — 80053 COMPREHEN METABOLIC PANEL: CPT

## 2021-10-05 PROCEDURE — 36415 COLL VENOUS BLD VENIPUNCTURE: CPT

## 2021-10-05 PROCEDURE — 83880 ASSAY OF NATRIURETIC PEPTIDE: CPT

## 2021-10-05 PROCEDURE — 80061 LIPID PANEL: CPT

## 2021-10-13 ENCOUNTER — OFFICE VISIT (OUTPATIENT)
Dept: CARDIOLOGY | Facility: CLINIC | Age: 86
End: 2021-10-13

## 2021-10-13 VITALS
HEIGHT: 62 IN | WEIGHT: 109 LBS | SYSTOLIC BLOOD PRESSURE: 135 MMHG | DIASTOLIC BLOOD PRESSURE: 64 MMHG | BODY MASS INDEX: 20.06 KG/M2 | HEART RATE: 58 BPM

## 2021-10-13 DIAGNOSIS — I10 HYPERTENSION, ESSENTIAL: ICD-10-CM

## 2021-10-13 DIAGNOSIS — E78.5 HYPERLIPIDEMIA, UNSPECIFIED HYPERLIPIDEMIA TYPE: ICD-10-CM

## 2021-10-13 DIAGNOSIS — Z95.2 S/P MVR (MITRAL VALVE REPLACEMENT): ICD-10-CM

## 2021-10-13 DIAGNOSIS — Z95.0 PRESENCE OF CARDIAC PACEMAKER: ICD-10-CM

## 2021-10-13 DIAGNOSIS — N18.32 STAGE 3B CHRONIC KIDNEY DISEASE (HCC): ICD-10-CM

## 2021-10-13 DIAGNOSIS — I50.32 CHRONIC DIASTOLIC CONGESTIVE HEART FAILURE (HCC): Primary | ICD-10-CM

## 2021-10-13 PROCEDURE — 99214 OFFICE O/P EST MOD 30 MIN: CPT | Performed by: INTERNAL MEDICINE

## 2021-10-13 NOTE — ASSESSMENT & PLAN NOTE
Mrs. Herbert states that she is much worse over the last month or 2 since we made the changes in her pacemaker output.  She wants it back to where she was a few months ago.  She states that she has more palpitations and feels more short of breath now than she did the last time she saw.  She denies chest pain syncope.  She has had on and off dizziness

## 2021-10-13 NOTE — PROGRESS NOTES
Chief Complaint  Palpitations and Fatigue    Subjective            Fabiana Herbert presents to [facility]  History of Present Illness    Past Medical History:   Diagnosis Date   • Anemia    • Anxiety    • Arthritis     spine   • Back pain    • Cramps, muscle, general    • Degenerative lumbar disc 05/04/2014    LUMBAR /LS DISC DEGENERATION   • Difficulty in swallowing    • Dizziness    • Dysuria 05/24/2019   • EKG abnormality 08/12/2016   • Fatigue    • Headache    • Hearing loss    • Hyperlipidemia    • Hypertension, essential    • Hypothyroidism    • Imbalance    • Incontinence in female    • Ingrowing toenail    • Insomnia    • Iron deficiency anemia 02/01/2017   • Joint pain    • Leg pain    • Lightheadedness 03/14/2018    The patient reports onset of lightheadedness following heart surgery in 2016. She notes that this tends to be brought on by a change in position. I would consider orthostasis as a potential culprit. I will attempt to pursue orthostatic blood pressures but the patient notes that it is hard for her to lay flaat on the exam table. I will request her records be sent for my review.    • Low back pain 06/09/2014   • Lumbar degenerative disc disease 02/01/2017   • Macular degeneration     Dharmesh   • Mitral valve disorder    • Mitral valve replaced 12/07/2017   • Nocturia 05/24/2019   • Pacemaker 02/23/2018   • Peptic ulcer disease 02/01/2017   • Ringing in ear    • Sciatica 01/09/2015   • Shortness of breath    • Urethral caruncle 05/24/2019   • Vision changes    • Weakness        Allergies   Allergen Reactions   • Linzess [Linaclotide] Diarrhea   • Aspirin Unknown - Low Severity   • Ciprofloxacin Diarrhea and Unknown - High Severity   • Duloxetine Hcl GI Intolerance   • Ibandronic Acid Unknown - Low Severity   • Levofloxacin Mental Status Change   • Metronidazole Unknown - Low Severity   • Nitrofurantoin Unknown - Low Severity   • Nortriptyline Hcl Delirium   •  Pregabalin Unknown - Low Severity   • Ranitidine Hcl Unknown - Low Severity   • Sulfamethoxazole-Trimethoprim Unknown - Low Severity   • Tramadol Unknown - Low Severity   • Zolpidem Tartrate Other (See Comments)        Past Surgical History:   Procedure Laterality Date   • APPENDECTOMY  1951   • BREAST BIOPSY Left 1955   • CATARACT EXTRACTION WITH INTRAOCULAR LENS IMPLANT Bilateral     1992, 2001   • COLON SURGERY  2018    Dr. Alonso, Moderate Diverticulosis   • COLONOSCOPY     • CYSTOSCOPY  02/18/2020    excision of urethral prolapse w/ Dr. Ibanez   • CYSTOSCOPY  02/18/2020    Excision of Urethral Prolapse w/ Dr. Ibanez   • ENDOSCOPY  2018   • HEMORRHOIDECTOMY      2009/ 2014 hemorrhoid banding    • HYSTERECTOMY  1960    total    • LUMBAR LAMINECTOMY  07/2007   • MITRAL VALVE REPLACEMENT  11/17/2016    Dr. Singh in [place]   • PACEMAKER IMPLANTATION  11/2016    MRI compatible   • WRIST SURGERY Left         Social History     Tobacco Use   • Smoking status: Never Smoker   • Smokeless tobacco: Never Used   Vaping Use   • Vaping Use: Never used   Substance Use Topics   • Alcohol use: Never   • Drug use: Never       Family History   Problem Relation Age of Onset   • Heart disease Father    • Diabetes Father    • Heart disease Sister    • Diabetes Sister    • Heart disease Other    • Heart disease Other         Prior to Admission medications    Medication Sig Start Date End Date Taking? Authorizing Provider   Bacillus Coagulans-Inulin (Probiotic) 1-250 BILLION-MG capsule Probiotic 15 billion cell oral capsule take 2 capsules by oral route daily   Active   Yes Raulito Cota MD   Bystolic 10 MG tablet TAKE 1 TABLET BY MOUTH EVERY NIGHT AT BEDTIME 8/31/21  Yes Peter Hemphill MD   docusate sodium 100 MG capsule Stool Softener 100 mg oral capsule take 1 capsule (100 mg) by oral route once daily   Suspended   Yes Raulito Cota MD   estradiol (ESTRACE) 0.1 MG/GM vaginal cream Insert 1 g into the vagina  Daily. 7/22/21  Yes Kika Stevenson APRN   famotidine (PEPCID) 20 MG tablet Take 1 tablet by mouth Daily As Needed for Heartburn. 9/12/21  Yes Kkia Stevenson APRN   hydroCHLOROthiazide (MICROZIDE) 12.5 MG capsule TAKE ONE CAPSULE BY MOUTH EVERY DAY 7/19/21  Yes Kika tSevenson APRN   hydrOXYzine (ATARAX) 25 MG tablet Take 1 tab PO bid prn itching 7/20/21  Yes Kika Stevenson APRN   levothyroxine (SYNTHROID, LEVOTHROID) 50 MCG tablet Take 50 mcg by mouth Daily. 4/23/21  Yes Raulito Cota MD   lidocaine (LIDODERM) 5 % APPLY 2 PATCHES EXTERNALLY TO THE SKIN EVERY DAY. MAY WEAR UP TO 12 HOURS 8/24/21  Yes Kika Stevenson APRN   LORazepam (ATIVAN) 0.5 MG tablet TAKE 1 TABLET BY MOUTH DAILY AS NEEDED FOR ANXIETY 9/20/21  Yes Kika Stevenson APRN   losartan (COZAAR) 25 MG tablet Take 25 mg by mouth Daily. 6/11/21  Yes Raulito Cota MD   mupirocin (BACTROBAN) 2 % ointment APPLY SMALL AMOUNT TOPICALLY TO THE AFFECTED AREA THREE TIMES DAILY 8/27/21  Yes Kika Stevenson APRN   pantoprazole (PROTONIX) 40 MG EC tablet Take 1 tablet by mouth Daily. 8/31/21  Yes Sherry Buckley, HILTON   polyethylene glycol (MiraLax) 17 GM/SCOOP powder 17 g.   Yes Raulito Cota MD   spironolactone (ALDACTONE) 25 MG tablet 25 mg Daily. 5/12/21  Yes Raulito Cota MD   Glycerin, Laxative, (ADULT SUPPOSITORY RE) Insert 4 suppositories into the rectum Daily As Needed. Hydrocortisone acetate powder 0.576 g 2.5%  Lidocaine powder 0.461 g 2%  Silica gel powder 0.79 g  Mineral oil light oil 0.666 mL  pegblend supp base oint 30 g    Xuan Ibanez MD   hydrocortisone 2.5 % cream Apply  topically to the appropriate area as directed 2 (Two) Times a Day. 8/29/21   Raulito Cota MD   linaclotide (Linzess) 72 MCG capsule capsule Take 1 capsule by mouth Every Morning Before Breakfast. 8/31/21   Sherry Buckley, NP   Multiple Vitamins-Minerals (VISION FORMULA 2 PO) Vision  "Formula (with lutein) 1,000 unit-200 mg-60 unit-2 mg oral tablet take 1 tablet by oral route QD (OTC)   Active    Provider, MD Raulito   ondansetron (ZOFRAN) 4 MG tablet Take 1 tablet by mouth Every 8 (Eight) Hours As Needed for Nausea or Vomiting. 6/18/21   Kika Stevenson, APRN        Review of Systems   Constitutional: Positive for fatigue.   Respiratory: Positive for shortness of breath.    Cardiovascular: Positive for palpitations. Negative for chest pain and leg swelling.   Neurological: Positive for weakness and light-headedness.        Objective     /64 (BP Location: Right arm, Patient Position: Sitting)   Pulse 58   Ht 157.5 cm (62\")   Wt 49.4 kg (109 lb)   BMI 19.94 kg/m²       Physical Exam      Result Review :                      Lab Results   Component Value Date    PROBNP 636.4 10/05/2021     04/19/2021     02/05/2020     CMP    CMP 4/19/21 5/20/21 10/5/21   Glucose   95   Glucose 90 102 (A)    BUN 23 26 (A) 28 (A)   Creatinine 1.19 (A) 1.27 (A) 1.23 (A)   eGFR Non African Am   41 (A)   Sodium 139 142 141   Potassium 4.1 4.4 4.3   Chloride 101 103 104   Calcium 10.1 10.2 10.3   Albumin   4.40   Total Bilirubin   0.5   Alkaline Phosphatase   55   AST (SGOT)   22   ALT (SGPT)   20   (A) Abnormal value            CBC w/diff    CBC w/Diff 12/14/20 3/15/21   WBC 5.88 5.39   RBC 4.35 4.04 (A)   Hemoglobin 14.7 13.4   Hematocrit 45.3 41.3   .1 (A) 102.2 (A)   MCH 33.8 (A) 33.2 (A)   MCHC 32.5 (A) 32.4 (A)   RDW 12.3 12.2   Platelets 215 198   Neutrophil Rel % 59.0 55.1   Lymphocyte Rel % 25.7 28.2   Monocyte Rel % 8.7 12.1 (A)   Eosinophil Rel % 5.4 3.7   Basophil Rel % 1.0 0.7   (A) Abnormal value             Lipid Panel    Lipid Panel 12/14/20 10/5/21   Total Cholesterol  141   Total Cholesterol 188    Triglycerides 272 (A) 170 (A)   HDL Cholesterol 42 49   VLDL Cholesterol 54 (A) 29   LDL Cholesterol  92 63   LDL/HDL Ratio  1.18   (A) Abnormal value       Comments " are available for some flowsheets but are not being displayed.            Lab Results   Component Value Date    TSH 2.580 02/22/2021    TSH 2.910 12/14/2020    TSH 2.210 07/28/2020      Lab Results   Component Value Date    FREET4 1.5 02/22/2021      No results found for: DDIMERQUANT  Magnesium   Date Value Ref Range Status   02/21/2020 2.24 1.60 - 2.30 mg/dL Final      No results found for: DIGOXIN               Assessment and Plan        Diagnoses and all orders for this visit:    1. Chronic diastolic congestive heart failure (HCC) (Primary)  Assessment & Plan:  Mrs. Herbert states that she is much worse over the last month or 2 since we made the changes in her pacemaker output.  She wants it back to where she was a few months ago.  She states that she has more palpitations and feels more short of breath now than she did the last time she saw.  She denies chest pain syncope.  She has had on and off dizziness    Orders:  -     Lipid Panel; Future  -     Comprehensive Metabolic Panel; Future  -     Magnesium; Future  -     T4, Free; Future  -     TSH; Future    2. S/P MVR (mitral valve replacement)  -     Lipid Panel; Future  -     Comprehensive Metabolic Panel; Future  -     Magnesium; Future  -     T4, Free; Future  -     TSH; Future    3. Presence of cardiac pacemaker  Assessment & Plan:  Her pacemaker was reprogrammed to a lower rate of 70 beats per minute instead of 60.  She will call us in 2 weeks to let us know how she is feeling.    Orders:  -     Lipid Panel; Future  -     Comprehensive Metabolic Panel; Future  -     Magnesium; Future  -     T4, Free; Future  -     TSH; Future    4. Stage 3b chronic kidney disease (HCC)  -     Lipid Panel; Future  -     Comprehensive Metabolic Panel; Future  -     Magnesium; Future  -     T4, Free; Future  -     TSH; Future    5. Hypertension, essential    6. Hyperlipidemia, unspecified hyperlipidemia type          Follow Up     Return in about 6 months (around 4/13/2022) for  Device check.    Patient was given instructions and counseling regarding her condition or for health maintenance advice. Please see specific information pulled into the AVS if appropriate.

## 2021-10-13 NOTE — ASSESSMENT & PLAN NOTE
Her pacemaker was reprogrammed to a lower rate of 70 beats per minute instead of 60.  She will call us in 2 weeks to let us know how she is feeling.

## 2021-11-15 RX ORDER — LEVOTHYROXINE SODIUM 0.05 MG/1
50 TABLET ORAL DAILY
Qty: 30 TABLET | Refills: 0 | Status: SHIPPED | OUTPATIENT
Start: 2021-11-15 | End: 2021-12-13 | Stop reason: SDUPTHER

## 2021-11-15 NOTE — TELEPHONE ENCOUNTER
Caller: [facility] - [id number] AT [facility] [address] - [phone] [facility] [phone] FX    Relationship: Pharmacy    Best call back number: [phone]    Requested Prescriptions:   Requested Prescriptions     Pending Prescriptions Disp Refills   • levothyroxine (SYNTHROID, LEVOTHROID) 50 MCG tablet       Sig: Take 1 tablet by mouth Daily.        Pharmacy where request should be sent: [facility] - [email or web address] [facility] [address] - [phone] [facility] [phone] FX       Does the patient have less than a 3 day supply:  [] Yes  [x] No    Ivelisse Cristina Rep   11/15/21 14:02 EST

## 2021-12-13 DIAGNOSIS — E03.9 HYPOTHYROIDISM, UNSPECIFIED TYPE: Primary | ICD-10-CM

## 2021-12-13 RX ORDER — LEVOTHYROXINE SODIUM 0.05 MG/1
50 TABLET ORAL DAILY
Qty: 30 TABLET | Refills: 1 | Status: SHIPPED | OUTPATIENT
Start: 2021-12-13 | End: 2022-02-14 | Stop reason: SDUPTHER

## 2021-12-29 ENCOUNTER — APPOINTMENT (OUTPATIENT)
Dept: GENERAL RADIOLOGY | Facility: HOSPITAL | Age: 86
End: 2021-12-29

## 2021-12-29 ENCOUNTER — HOSPITAL ENCOUNTER (EMERGENCY)
Facility: HOSPITAL | Age: 86
Discharge: LEFT BEFORE DISCHARGE | End: 2021-12-29

## 2021-12-29 VITALS
DIASTOLIC BLOOD PRESSURE: 87 MMHG | WEIGHT: 104 LBS | HEART RATE: 76 BPM | TEMPERATURE: 97.5 F | OXYGEN SATURATION: 98 % | SYSTOLIC BLOOD PRESSURE: 152 MMHG | HEIGHT: 62 IN | RESPIRATION RATE: 20 BRPM | BODY MASS INDEX: 19.14 KG/M2

## 2021-12-29 DIAGNOSIS — I10 HYPERTENSION, ESSENTIAL: ICD-10-CM

## 2021-12-29 LAB
ALBUMIN SERPL-MCNC: 4.6 G/DL (ref 3.5–5.2)
ALBUMIN/GLOB SERPL: 1.4 G/DL
ALP SERPL-CCNC: 56 U/L (ref 39–117)
ALT SERPL W P-5'-P-CCNC: 13 U/L (ref 1–33)
ANION GAP SERPL CALCULATED.3IONS-SCNC: 13.2 MMOL/L (ref 5–15)
AST SERPL-CCNC: 19 U/L (ref 1–32)
BASOPHILS # BLD AUTO: 0.04 10*3/MM3 (ref 0–0.2)
BASOPHILS NFR BLD AUTO: 0.5 % (ref 0–1.5)
BILIRUB SERPL-MCNC: 0.5 MG/DL (ref 0–1.2)
BUN SERPL-MCNC: 28 MG/DL (ref 8–23)
BUN/CREAT SERPL: 20 (ref 7–25)
CALCIUM SPEC-SCNC: 10.3 MG/DL (ref 8.6–10.5)
CHLORIDE SERPL-SCNC: 101 MMOL/L (ref 98–107)
CO2 SERPL-SCNC: 24.8 MMOL/L (ref 22–29)
CREAT SERPL-MCNC: 1.4 MG/DL (ref 0.57–1)
DEPRECATED RDW RBC AUTO: 47.8 FL (ref 37–54)
EOSINOPHIL # BLD AUTO: 0.01 10*3/MM3 (ref 0–0.4)
EOSINOPHIL NFR BLD AUTO: 0.1 % (ref 0.3–6.2)
ERYTHROCYTE [DISTWIDTH] IN BLOOD BY AUTOMATED COUNT: 12.3 % (ref 12.3–15.4)
GFR SERPL CREATININE-BSD FRML MDRD: 36 ML/MIN/1.73
GLOBULIN UR ELPH-MCNC: 3.3 GM/DL
GLUCOSE SERPL-MCNC: 123 MG/DL (ref 65–99)
HCT VFR BLD AUTO: 42.1 % (ref 34–46.6)
HGB BLD-MCNC: 14 G/DL (ref 12–15.9)
HOLD SPECIMEN: NORMAL
HOLD SPECIMEN: NORMAL
IMM GRANULOCYTES # BLD AUTO: 0.02 10*3/MM3 (ref 0–0.05)
IMM GRANULOCYTES NFR BLD AUTO: 0.2 % (ref 0–0.5)
LYMPHOCYTES # BLD AUTO: 0.91 10*3/MM3 (ref 0.7–3.1)
LYMPHOCYTES NFR BLD AUTO: 10.5 % (ref 19.6–45.3)
MCH RBC QN AUTO: 34.7 PG (ref 26.6–33)
MCHC RBC AUTO-ENTMCNC: 33.3 G/DL (ref 31.5–35.7)
MCV RBC AUTO: 104.2 FL (ref 79–97)
MONOCYTES # BLD AUTO: 0.6 10*3/MM3 (ref 0.1–0.9)
MONOCYTES NFR BLD AUTO: 6.9 % (ref 5–12)
NEUTROPHILS NFR BLD AUTO: 7.12 10*3/MM3 (ref 1.7–7)
NEUTROPHILS NFR BLD AUTO: 81.8 % (ref 42.7–76)
NRBC BLD AUTO-RTO: 0 /100 WBC (ref 0–0.2)
PLATELET # BLD AUTO: 185 10*3/MM3 (ref 140–450)
PMV BLD AUTO: 11.4 FL (ref 6–12)
POTASSIUM SERPL-SCNC: 4.8 MMOL/L (ref 3.5–5.2)
PROT SERPL-MCNC: 7.9 G/DL (ref 6–8.5)
RBC # BLD AUTO: 4.04 10*6/MM3 (ref 3.77–5.28)
SODIUM SERPL-SCNC: 139 MMOL/L (ref 136–145)
WBC NRBC COR # BLD: 8.7 10*3/MM3 (ref 3.4–10.8)
WHOLE BLOOD HOLD SPECIMEN: NORMAL
WHOLE BLOOD HOLD SPECIMEN: NORMAL

## 2021-12-29 PROCEDURE — 99211 OFF/OP EST MAY X REQ PHY/QHP: CPT

## 2021-12-29 PROCEDURE — 80053 COMPREHEN METABOLIC PANEL: CPT

## 2021-12-29 PROCEDURE — 85025 COMPLETE CBC W/AUTO DIFF WBC: CPT

## 2021-12-29 RX ORDER — HYDROCHLOROTHIAZIDE 12.5 MG/1
CAPSULE, GELATIN COATED ORAL
Qty: 90 CAPSULE | Refills: 1 | Status: SHIPPED | OUTPATIENT
Start: 2021-12-29 | End: 2022-01-21 | Stop reason: SDUPTHER

## 2021-12-30 ENCOUNTER — TELEPHONE (OUTPATIENT)
Dept: FAMILY MEDICINE CLINIC | Facility: CLINIC | Age: 86
End: 2021-12-30

## 2021-12-30 DIAGNOSIS — M25.559 HIP PAIN: Primary | ICD-10-CM

## 2021-12-30 NOTE — TELEPHONE ENCOUNTER
Caller: TORY HITCHCOCK    Relationship: Emergency Contact    Best call back number: [phone]     Who are you requesting to speak with (clinical staff, provider,  specific staff member): CLINICAL    What was the call regarding: THE PATIENT'S DAUGHTER HAS CALLED REQUESTING FOR THE PATIENT BE SEEN PRIOR TO 01/05/2022.  HUB ADVISED THAT THERE WERE NO SOONER OPENINGS AVAILABLE.      DAUGHTER THEN REQUESTED ADVICE FROM CLINICAL STAFF REGARDING THE PATIENTS CURRENT HIP PAIN.  THERE ARE SEEKING ADVICE ON WHAT ELSE THE PATIENT CAN TAKE OVER THE COUNTER TO HELP WITH THE PAIN SHE IS IN.     Do you require a callback: YES, PLEASE CALL AND ADVISE.

## 2021-12-31 RX ORDER — METHYLPREDNISOLONE 4 MG/1
TABLET ORAL
Qty: 21 EACH | Refills: 0 | Status: SHIPPED | OUTPATIENT
Start: 2021-12-31 | End: 2022-02-15

## 2021-12-31 NOTE — TELEPHONE ENCOUNTER
Talked w/daughter sent in medrol manju and instructed to apply lidoderm patch to the hip-she has an appt with me on Wed.

## 2022-01-01 ENCOUNTER — TELEPHONE (OUTPATIENT)
Dept: FAMILY MEDICINE CLINIC | Facility: CLINIC | Age: 87
End: 2022-01-01

## 2022-01-05 ENCOUNTER — HOSPITAL ENCOUNTER (OUTPATIENT)
Dept: GENERAL RADIOLOGY | Facility: HOSPITAL | Age: 87
Discharge: HOME | End: 2022-01-05

## 2022-01-05 ENCOUNTER — OFFICE VISIT (OUTPATIENT)
Dept: FAMILY MEDICINE CLINIC | Facility: CLINIC | Age: 87
End: 2022-01-05

## 2022-01-05 VITALS
SYSTOLIC BLOOD PRESSURE: 118 MMHG | WEIGHT: 104.2 LBS | DIASTOLIC BLOOD PRESSURE: 77 MMHG | BODY MASS INDEX: 19.17 KG/M2 | OXYGEN SATURATION: 98 % | HEIGHT: 62 IN | HEART RATE: 77 BPM

## 2022-01-05 DIAGNOSIS — M54.42 CHRONIC LEFT-SIDED LOW BACK PAIN WITH LEFT-SIDED SCIATICA: ICD-10-CM

## 2022-01-05 DIAGNOSIS — G89.29 CHRONIC LEFT-SIDED LOW BACK PAIN WITH LEFT-SIDED SCIATICA: Primary | ICD-10-CM

## 2022-01-05 DIAGNOSIS — G89.29 CHRONIC LEFT-SIDED LOW BACK PAIN WITH LEFT-SIDED SCIATICA: ICD-10-CM

## 2022-01-05 DIAGNOSIS — M54.42 CHRONIC LEFT-SIDED LOW BACK PAIN WITH LEFT-SIDED SCIATICA: Primary | ICD-10-CM

## 2022-01-05 PROCEDURE — 72110 X-RAY EXAM L-2 SPINE 4/>VWS: CPT

## 2022-01-05 PROCEDURE — 73502 X-RAY EXAM HIP UNI 2-3 VIEWS: CPT

## 2022-01-05 PROCEDURE — 99213 OFFICE O/P EST LOW 20 MIN: CPT | Performed by: NURSE PRACTITIONER

## 2022-01-05 RX ORDER — LIDOCAINE 50 MG/G
PATCH TOPICAL
COMMUNITY
Start: 2021-11-13 | End: 2022-02-15

## 2022-01-05 RX ORDER — LIDOCAINE 50 MG/G
1 PATCH TOPICAL EVERY 24 HOURS
Qty: 30 PATCH | Refills: 2 | Status: SHIPPED | OUTPATIENT
Start: 2022-01-05 | End: 2022-06-13

## 2022-01-05 NOTE — PROGRESS NOTES
"Chief Complaint  Back Pain (low back pain ), Hip Pain (right ), and Follow-up (3 months )    Subjective          Fabiana Herbert presents to [facility]  History of Present Illness pt presents today for 3 month follow up   Pt has been having low back pain for 2 weeks pt is also having left hip pain as well-she went to the Urgent Care on 12/27/21 for the pain-she was given a Kenlaog injection and diclofenac which she stated did not help at all. She ended up going to ER on 12/29 for the back pain but left without being seen as she was there for several hours-states there were several sick people waiting to be tested for COVID and she was worried about ramone it so she left.  Pt has no other issues or concerns     Labs 10/13/2021    She  has a past medical history of Anemia, Anxiety, Arthritis, Back pain, Cramps, muscle, general, Degenerative lumbar disc (05/04/2014), Difficulty in swallowing, Dizziness, Dysuria (05/24/2019), EKG abnormality (08/12/2016), Fatigue, Headache, Hearing loss, Hyperlipidemia, Hypertension, essential, Hypothyroidism, Imbalance, Incontinence in female, Ingrowing toenail, Insomnia, Iron deficiency anemia (02/01/2017), Joint pain, Leg pain, Lightheadedness (03/14/2018), Low back pain (06/09/2014), Lumbar degenerative disc disease (02/01/2017), Macular degeneration, Mitral valve disorder, Mitral valve replaced (12/07/2017), Nocturia (05/24/2019), Pacemaker (02/23/2018), Peptic ulcer disease (02/01/2017), Ringing in ear, Sciatica (01/09/2015), Shortness of breath, Urethral caruncle (05/24/2019), Vision changes, and Weakness.     Objective   Vital Signs:   /77 (BP Location: Right arm)   Pulse 77   Ht 157.5 cm (62\")   Wt 47.3 kg (104 lb 3.2 oz)   SpO2 98%   BMI 19.06 kg/m²     Physical Exam   Result Review :   The following data was reviewed by: JACKLYN Cedillo on 01/05/2022:    Data reviewed: Urgent Care and ER visits          Past Surgical " History:   Procedure Laterality Date   • APPENDECTOMY  1951   • BREAST BIOPSY Left 1955   • CATARACT EXTRACTION WITH INTRAOCULAR LENS IMPLANT Bilateral     1992, 2001   • COLON SURGERY  2018    Dr. Alonso, Moderate Diverticulosis   • COLONOSCOPY     • CYSTOSCOPY  02/18/2020    excision of urethral prolapse w/ Dr. Ibanez   • CYSTOSCOPY  02/18/2020    Excision of Urethral Prolapse w/ Dr. Ibanez   • ENDOSCOPY  2018   • HEMORRHOIDECTOMY      2009/ 2014 hemorrhoid banding    • HYSTERECTOMY  1960    total    • LUMBAR LAMINECTOMY  07/2007   • MITRAL VALVE REPLACEMENT  11/17/2016    Dr. Singh in [place]   • PACEMAKER IMPLANTATION  11/2016    MRI compatible   • WRIST SURGERY Left       Family History   Problem Relation Age of Onset   • Heart disease Father    • Diabetes Father    • Heart disease Sister    • Diabetes Sister    • Heart disease Other    • Heart disease Other         Current Outpatient Medications:   •  Bacillus Coagulans-Inulin (Probiotic) 1-250 BILLION-MG capsule, Probiotic 15 billion cell oral capsule take 2 capsules by oral route daily   Active, Disp: , Rfl:   •  Bystolic 10 MG tablet, TAKE 1 TABLET BY MOUTH EVERY NIGHT AT BEDTIME, Disp: 30 tablet, Rfl: 7  •  Diclofenac Sodium (VOLTAREN) 1 % gel gel, Apply 4 g topically to the appropriate area as directed 4 (Four) Times a Day As Needed (pain)., Disp: 100 g, Rfl: 0  •  docusate sodium 100 MG capsule, Stool Softener 100 mg oral capsule take 1 capsule (100 mg) by oral route once daily   Suspended, Disp: , Rfl:   •  estradiol (ESTRACE) 0.1 MG/GM vaginal cream, Insert 1 g into the vagina Daily., Disp: 42.5 g, Rfl: 3  •  famotidine (PEPCID) 20 MG tablet, Take 1 tablet by mouth Daily As Needed for Heartburn., Disp: 90 tablet, Rfl: 1  •  hydroCHLOROthiazide (MICROZIDE) 12.5 MG capsule, TAKE ONE CAPSULE BY MOUTH EVERY DAY, Disp: 90 capsule, Rfl: 1  •  hydrocortisone 2.5 % cream, , Disp: , Rfl:   •  hydrOXYzine (ATARAX) 25 MG tablet, Take 1 tab PO bid prn itching,  Disp: 60 tablet, Rfl: 2  •  levothyroxine (SYNTHROID, LEVOTHROID) 50 MCG tablet, Take 1 tablet by mouth Daily., Disp: 30 tablet, Rfl: 1  •  lidocaine (LIDODERM) 5 %, , Disp: , Rfl:   •  lidocaine (LIDODERM) 5 %, Place 1 patch on the skin as directed by provider Daily. Remove & Discard patch within 12 hours or as directed by MD, Disp: 30 patch, Rfl: 2  •  LORazepam (ATIVAN) 0.5 MG tablet, TAKE 1 TABLET BY MOUTH DAILY AS NEEDED FOR ANXIETY, Disp: 90 tablet, Rfl: 0  •  losartan (COZAAR) 25 MG tablet, Take 25 mg by mouth Daily., Disp: , Rfl:   •  methylPREDNISolone (MEDROL) 4 MG dose pack, Take as directed on package instructions., Disp: 21 each, Rfl: 0  •  mupirocin (BACTROBAN) 2 % ointment, APPLY SMALL AMOUNT TOPICALLY TO THE AFFECTED AREA THREE TIMES DAILY, Disp: 22 g, Rfl: 1  •  ondansetron (ZOFRAN) 4 MG tablet, Take 1 tablet by mouth Every 8 (Eight) Hours As Needed for Nausea or Vomiting., Disp: 30 tablet, Rfl: 2  •  pantoprazole (PROTONIX) 40 MG EC tablet, Take 1 tablet by mouth Daily., Disp: 30 tablet, Rfl: 3  •  polyethylene glycol (MiraLax) 17 GM/SCOOP powder, 17 g., Disp: , Rfl:   •  spironolactone (ALDACTONE) 25 MG tablet, 25 mg Daily., Disp: , Rfl:    Assessment and Plan    Diagnoses and all orders for this visit:    1. Chronic left-sided low back pain with left-sided sciatica (Primary)  -     lidocaine (LIDODERM) 5 %; Place 1 patch on the skin as directed by provider Daily. Remove & Discard patch within 12 hours or as directed by MD  Dispense: 30 patch; Refill: 2  -     XR Spine Lumbar Complete 4+VW; Future  -     Ambulatory Referral to Pain Management  -     XR Hip With or Without Pelvis 2 - 3 View Left; Future        Follow Up   Return if symptoms worsen or fail to improve.  Patient was given instructions and counseling regarding her condition or for health maintenance advice. Please see specific information pulled into the AVS if appropriate.     Fabiana Herbert  reports that she has never smoked. She has  never used smokeless tobacco..                Kika Stevenson, JACKLYN    The patient is advised to continue current medications.

## 2022-01-06 ENCOUNTER — TELEPHONE (OUTPATIENT)
Dept: FAMILY MEDICINE CLINIC | Facility: CLINIC | Age: 87
End: 2022-01-06

## 2022-01-06 DIAGNOSIS — R93.89 ABNORMAL X-RAY: ICD-10-CM

## 2022-01-06 DIAGNOSIS — M54.42 CHRONIC LEFT-SIDED LOW BACK PAIN WITH LEFT-SIDED SCIATICA: Primary | ICD-10-CM

## 2022-01-06 DIAGNOSIS — G89.29 CHRONIC LEFT-SIDED LOW BACK PAIN WITH LEFT-SIDED SCIATICA: Primary | ICD-10-CM

## 2022-01-06 NOTE — TELEPHONE ENCOUNTER
----- Message from JACKLYN Cedillo sent at 1/5/2022  9:46 PM EST -----  Significant Multilevel degenerative change and arthritis-need MRI lumbar spine without contrast

## 2022-01-11 ENCOUNTER — TELEPHONE (OUTPATIENT)
Dept: FAMILY MEDICINE CLINIC | Facility: CLINIC | Age: 87
End: 2022-01-11

## 2022-01-11 NOTE — TELEPHONE ENCOUNTER
Caller: TORY HITCHCOCK    Relationship: Emergency Contact    Best call back number: [phone]    Who are you requesting to speak with (clinical staff, provider,  specific staff member): CLINICAL    What was the call regarding: THE PATIENT'S DAUGHTER HAS CALLED REQUESTING THE PATIENT'S MRI RESULTS FROM 01/05/2022    Do you require a callback: YES, PLEASE ADVISE.

## 2022-01-17 ENCOUNTER — TELEPHONE (OUTPATIENT)
Dept: CARDIOLOGY | Facility: CLINIC | Age: 87
End: 2022-01-17

## 2022-01-18 ENCOUNTER — TELEPHONE (OUTPATIENT)
Dept: CARDIOLOGY | Facility: CLINIC | Age: 87
End: 2022-01-18

## 2022-01-18 ENCOUNTER — TRANSCRIBE ORDERS (OUTPATIENT)
Dept: ADMINISTRATIVE | Facility: HOSPITAL | Age: 87
End: 2022-01-18

## 2022-01-18 DIAGNOSIS — M54.16 LUMBAR RADICULOPATHY: Primary | ICD-10-CM

## 2022-01-18 NOTE — TELEPHONE ENCOUNTER
I am mailing her a copy of her Pacemaker card.  One of her Doctor offices made a copy and did not return it.

## 2022-01-19 DIAGNOSIS — F41.9 ANXIETY: ICD-10-CM

## 2022-01-19 DIAGNOSIS — I10 HYPERTENSION, ESSENTIAL: ICD-10-CM

## 2022-01-19 NOTE — TELEPHONE ENCOUNTER
Incoming Refill Request      Medication requested (name and dose): hydroCHLOROthiazide (MICROZIDE) 12.5 MG capsule TAKE ONE CAPSULE BY MOUTH EVERY DAY     LORazepam (ATIVAN) 0.5 MG tablet  TAKE 1 TABLET BY MOUTH DAILY AS NEEDED FOR ANXIETY    Pharmacy where request should be sent: [facility]     Additional details provided by patient: PATIENT IS COMPLETELY OUT OF MEDICINE...SHE IS CHANGING TO [facility]    Best call back number: [phone]    Does the patient have less than a 3 day supply:  [x] Yes  [] No    Ivelisse Olivia Rep  01/19/22, 15:02 EST

## 2022-01-21 DIAGNOSIS — I10 HYPERTENSION, ESSENTIAL: ICD-10-CM

## 2022-01-21 DIAGNOSIS — F41.9 ANXIETY: ICD-10-CM

## 2022-01-21 RX ORDER — LORAZEPAM 0.5 MG/1
0.5 TABLET ORAL DAILY PRN
Qty: 90 TABLET | Refills: 0 | Status: CANCELLED | OUTPATIENT
Start: 2022-01-21

## 2022-01-21 RX ORDER — LORAZEPAM 0.5 MG/1
0.5 TABLET ORAL DAILY PRN
Qty: 90 TABLET | Refills: 0 | Status: SHIPPED | OUTPATIENT
Start: 2022-01-21 | End: 2022-05-03 | Stop reason: SDUPTHER

## 2022-01-21 RX ORDER — HYDROCHLOROTHIAZIDE 12.5 MG/1
12.5 CAPSULE, GELATIN COATED ORAL DAILY
Qty: 90 CAPSULE | Refills: 1 | Status: CANCELLED | OUTPATIENT
Start: 2022-01-21

## 2022-01-21 RX ORDER — HYDROCHLOROTHIAZIDE 12.5 MG/1
12.5 CAPSULE, GELATIN COATED ORAL DAILY
Qty: 90 CAPSULE | Refills: 1 | Status: SHIPPED | OUTPATIENT
Start: 2022-01-21 | End: 2022-02-15

## 2022-01-24 ENCOUNTER — OFFICE VISIT (OUTPATIENT)
Dept: FAMILY MEDICINE CLINIC | Facility: CLINIC | Age: 87
End: 2022-01-24

## 2022-01-24 ENCOUNTER — LAB (OUTPATIENT)
Dept: LAB | Facility: HOSPITAL | Age: 87
End: 2022-01-24

## 2022-01-24 VITALS
WEIGHT: 99.4 LBS | DIASTOLIC BLOOD PRESSURE: 86 MMHG | SYSTOLIC BLOOD PRESSURE: 122 MMHG | OXYGEN SATURATION: 98 % | HEART RATE: 65 BPM | BODY MASS INDEX: 18.18 KG/M2 | TEMPERATURE: 97.5 F

## 2022-01-24 DIAGNOSIS — N39.0 URINARY TRACT INFECTION WITH HEMATURIA, SITE UNSPECIFIED: ICD-10-CM

## 2022-01-24 DIAGNOSIS — R31.9 URINARY TRACT INFECTION WITH HEMATURIA, SITE UNSPECIFIED: ICD-10-CM

## 2022-01-24 DIAGNOSIS — R10.30 LOWER ABDOMINAL PAIN: Primary | ICD-10-CM

## 2022-01-24 DIAGNOSIS — R10.30 LOWER ABDOMINAL PAIN: ICD-10-CM

## 2022-01-24 LAB
ALBUMIN SERPL-MCNC: 4.6 G/DL (ref 3.5–5.2)
ALBUMIN/GLOB SERPL: 1.3 G/DL
ALP SERPL-CCNC: 89 U/L (ref 39–117)
ALT SERPL W P-5'-P-CCNC: 13 U/L (ref 1–33)
ANION GAP SERPL CALCULATED.3IONS-SCNC: 11.3 MMOL/L (ref 5–15)
AST SERPL-CCNC: 24 U/L (ref 1–32)
BASOPHILS # BLD MANUAL: 0.08 10*3/MM3 (ref 0–0.2)
BASOPHILS NFR BLD MANUAL: 1 % (ref 0–1.5)
BILIRUB BLD-MCNC: NEGATIVE MG/DL
BILIRUB SERPL-MCNC: 0.4 MG/DL (ref 0–1.2)
BUN SERPL-MCNC: 28 MG/DL (ref 8–23)
BUN/CREAT SERPL: 17.5 (ref 7–25)
CALCIUM SPEC-SCNC: 10.4 MG/DL (ref 8.6–10.5)
CHLORIDE SERPL-SCNC: 101 MMOL/L (ref 98–107)
CLARITY, POC: CLEAR
CO2 SERPL-SCNC: 27.7 MMOL/L (ref 22–29)
COLOR UR: YELLOW
CREAT SERPL-MCNC: 1.6 MG/DL (ref 0.57–1)
DEPRECATED RDW RBC AUTO: 47.8 FL (ref 37–54)
ERYTHROCYTE [DISTWIDTH] IN BLOOD BY AUTOMATED COUNT: 12.2 % (ref 12.3–15.4)
EXPIRATION DATE: ABNORMAL
GFR SERPL CREATININE-BSD FRML MDRD: 30 ML/MIN/1.73
GLOBULIN UR ELPH-MCNC: 3.6 GM/DL
GLUCOSE SERPL-MCNC: 97 MG/DL (ref 65–99)
GLUCOSE UR STRIP-MCNC: NEGATIVE MG/DL
HCT VFR BLD AUTO: 44 % (ref 34–46.6)
HGB BLD-MCNC: 14.6 G/DL (ref 12–15.9)
KETONES UR QL: NEGATIVE
LEUKOCYTE EST, POC: ABNORMAL
LYMPHOCYTES # BLD MANUAL: 0.57 10*3/MM3 (ref 0.7–3.1)
LYMPHOCYTES NFR BLD MANUAL: 6.1 % (ref 5–12)
Lab: ABNORMAL
MACROCYTES BLD QL SMEAR: ABNORMAL
MCH RBC QN AUTO: 35.4 PG (ref 26.6–33)
MCHC RBC AUTO-ENTMCNC: 33.2 G/DL (ref 31.5–35.7)
MCV RBC AUTO: 106.8 FL (ref 79–97)
MONOCYTES # BLD: 0.49 10*3/MM3 (ref 0.1–0.9)
NEUTROPHILS # BLD AUTO: 6.94 10*3/MM3 (ref 1.7–7)
NEUTROPHILS NFR BLD MANUAL: 85.9 % (ref 42.7–76)
NITRITE UR-MCNC: POSITIVE MG/ML
NRBC BLD AUTO-RTO: 0 /100 WBC (ref 0–0.2)
PH UR: 5.5 [PH] (ref 5–8)
PLAT MORPH BLD: NORMAL
PLATELET # BLD AUTO: 231 10*3/MM3 (ref 140–450)
PMV BLD AUTO: 11.8 FL (ref 6–12)
POTASSIUM SERPL-SCNC: 4.1 MMOL/L (ref 3.5–5.2)
PROT SERPL-MCNC: 8.2 G/DL (ref 6–8.5)
PROT UR STRIP-MCNC: ABNORMAL MG/DL
RBC # BLD AUTO: 4.12 10*6/MM3 (ref 3.77–5.28)
RBC # UR STRIP: ABNORMAL /UL
SODIUM SERPL-SCNC: 140 MMOL/L (ref 136–145)
SP GR UR: 1.03 (ref 1–1.03)
UROBILINOGEN UR QL: NORMAL
VARIANT LYMPHS NFR BLD MANUAL: 7.1 % (ref 19.6–45.3)
WBC MORPH BLD: NORMAL
WBC NRBC COR # BLD: 8.08 10*3/MM3 (ref 3.4–10.8)

## 2022-01-24 PROCEDURE — 80053 COMPREHEN METABOLIC PANEL: CPT

## 2022-01-24 PROCEDURE — 81003 URINALYSIS AUTO W/O SCOPE: CPT | Performed by: PHYSICIAN ASSISTANT

## 2022-01-24 PROCEDURE — 85007 BL SMEAR W/DIFF WBC COUNT: CPT

## 2022-01-24 PROCEDURE — 85025 COMPLETE CBC W/AUTO DIFF WBC: CPT

## 2022-01-24 PROCEDURE — 99213 OFFICE O/P EST LOW 20 MIN: CPT | Performed by: PHYSICIAN ASSISTANT

## 2022-01-24 PROCEDURE — 87086 URINE CULTURE/COLONY COUNT: CPT | Performed by: PHYSICIAN ASSISTANT

## 2022-01-24 PROCEDURE — 36415 COLL VENOUS BLD VENIPUNCTURE: CPT

## 2022-01-24 PROCEDURE — 87186 SC STD MICRODIL/AGAR DIL: CPT | Performed by: PHYSICIAN ASSISTANT

## 2022-01-24 RX ORDER — CHOLECALCIFEROL (VITAMIN D3) 1250 MCG
CAPSULE ORAL
COMMUNITY
End: 2022-02-15

## 2022-01-24 RX ORDER — NALOXONE HYDROCHLORIDE 4 MG/.1ML
SPRAY NASAL
COMMUNITY
Start: 2022-01-15 | End: 2022-02-15

## 2022-01-24 RX ORDER — CEPHALEXIN 500 MG/1
500 CAPSULE ORAL 2 TIMES DAILY
Qty: 14 CAPSULE | Refills: 0 | Status: SHIPPED | OUTPATIENT
Start: 2022-01-24 | End: 2022-02-15

## 2022-01-24 RX ORDER — HYDROCODONE BITARTRATE AND ACETAMINOPHEN 5; 325 MG/1; MG/1
TABLET ORAL EVERY 12 HOURS
COMMUNITY
End: 2022-02-15

## 2022-01-24 NOTE — PROGRESS NOTES
Chief Complaint  Chief Complaint   Patient presents with   • Nausea   • Diarrhea   • Abdominal Pain       Subjective          Fabiana Herbert presents to [facility]  History of Present Illness     Stomach Pain    Patient complains of diarrhea, nausea and abdominal bloating. Patient states it has been going on for a few weeks now. She has been taking Zofran for nausea and that is helping. Patient has history of previous colon surgery; thinks it was secondary to blockage. Previous hemorrhoid surgery with Dr. Rios. Denies fever. States weight loss of about 5 lbs in past weeks. States abdominal bloating and pain. No vomiting; worse after eating. Denies urinary symptoms. States stool is dark; denies using pepto bismol or taking iron.    Labs: 12/29/21    Covid: vaccinated    Flu: vaccinated    MAW: 06/18/21    DEXA: 11/06/19    Medical History: has a past medical history of Anemia, Anxiety, Arthritis, Back pain, Cramps, muscle, general, Degenerative lumbar disc (05/04/2014), Difficulty in swallowing, Dizziness, Dysuria (05/24/2019), EKG abnormality (08/12/2016), Fatigue, Headache, Hearing loss, Hyperlipidemia, Hypertension, essential, Hypothyroidism, Imbalance, Incontinence in female, Ingrowing toenail, Insomnia, Iron deficiency anemia (02/01/2017), Joint pain, Leg pain, Lightheadedness (03/14/2018), Low back pain (06/09/2014), Lumbar degenerative disc disease (02/01/2017), Macular degeneration, Mitral valve disorder, Mitral valve replaced (12/07/2017), Nocturia (05/24/2019), Pacemaker (02/23/2018), Peptic ulcer disease (02/01/2017), Ringing in ear, Sciatica (01/09/2015), Shortness of breath, Urethral caruncle (05/24/2019), Vision changes, and Weakness.   Surgical History: has a past surgical history that includes Appendectomy (1951); Breast biopsy (Left, 1955); Cataract extraction w/  intraocular lens implant (Bilateral); Colon surgery (2018); Cystoscopy (02/18/2020);  Esophagogastroduodenoscopy (2018); Cystoscopy (02/18/2020); Hemorrhoid surgery; Hysterectomy (1960); Lumbar laminectomy (07/2007); Mitral valve replacement (11/17/2016); Pacemaker Implantation (11/2016); Wrist surgery (Left); and Colonoscopy.   Family History: family history includes Diabetes in her father and sister; Heart disease in her father, sister, and other family members.   Social History: reports that she has never smoked. She has never used smokeless tobacco. She reports that she does not drink alcohol and does not use drugs.    Allergies: Linzess [linaclotide], Aspirin, Ciprofloxacin, Duloxetine hcl, Ibandronic acid, Levofloxacin, Metronidazole, Nitrofurantoin, Nortriptyline hcl, Pregabalin, Ranitidine hcl, Sulfa antibiotics, Sulfamethoxazole-trimethoprim, Tramadol, and Zolpidem tartrate    Health Maintenance Due   Topic Date Due   • TDAP/TD VACCINES (1 - Tdap) Never done   • ZOSTER VACCINE (2 of 3) 12/27/2012   • DXA SCAN  11/06/2021       Immunization History   Administered Date(s) Administered   • COVID-19 (MODERNA) 1st, 2nd, 3rd Dose Only 02/12/2021, 03/19/2021, 11/03/2021   • Fluzone High-Dose 65+yrs 10/05/2021   • Influenza, Unspecified 11/11/2020   • Pneumococcal Conjugate 13-Valent (PCV13) 07/27/2016   • Pneumococcal Polysaccharide (PPSV23) 11/01/2012   • Zostavax 11/01/2012       Objective     Vitals:    01/24/22 1142   BP: 122/86   BP Location: Left arm   Patient Position: Sitting   Pulse: 65   Temp: 97.5 °F (36.4 °C)   SpO2: 98%   Weight: 45.1 kg (99 lb 6.4 oz)     Body mass index is 18.18 kg/m².       Physical Exam  Vitals and nursing note reviewed.   Constitutional:       Appearance: Normal appearance.   HENT:      Head: Normocephalic and atraumatic.   Neck:      Vascular: No carotid bruit.      Comments: Thyroid : gland size normal, nontender, no nodules or masses present on palpation   Cardiovascular:      Rate and Rhythm: Normal rate and regular rhythm.      Pulses: Normal pulses.       Heart sounds: Normal heart sounds.   Pulmonary:      Effort: Pulmonary effort is normal.      Breath sounds: Normal breath sounds.   Abdominal:      Palpations: Abdomen is soft. There is no mass.      Tenderness: There is abdominal tenderness (RLQ and LLQ tenderness to palpation; mild). There is no right CVA tenderness, left CVA tenderness, guarding or rebound.   Musculoskeletal:      Cervical back: Neck supple. No tenderness.      Right lower leg: No edema.      Left lower leg: No edema.   Lymphadenopathy:      Cervical: No cervical adenopathy.   Neurological:      Mental Status: She is alert.   Psychiatric:         Mood and Affect: Mood normal.         Behavior: Behavior normal.           Result Review :                           Assessment and Plan      Diagnoses and all orders for this visit:    1. Lower abdominal pain (Primary)  Comments:  Check CT ASAP; advised patient to go to ER with worsening symptoms.  Orders:  -     CBC w AUTO Differential; Future  -     Comprehensive metabolic panel; Future  -     CT Abdomen Pelvis With Contrast; Future  -     POCT urinalysis dipstick, automated    2. Urinary tract infection with hematuria, site unspecified  Comments:  Positive in office; culture pending. Numerous allergies to antibiotics. Will treat with Keflex 500mg twice daily while awaiting culture. Check labs.  Orders:  -     Urine Culture - Urine, Urine, Clean Catch; Future  -     Urine Culture - Urine, Urine, Clean Catch  -     cephalexin (Keflex) 500 MG capsule; Take 1 capsule by mouth 2 (Two) Times a Day.  Dispense: 14 capsule; Refill: 0            Follow Up     Return if symptoms worsen or fail to improve.    Patient was given instructions and counseling regarding her condition or for health maintenance advice. Please see specific information pulled into the AVS if appropriate.

## 2022-01-27 ENCOUNTER — TELEPHONE (OUTPATIENT)
Dept: FAMILY MEDICINE CLINIC | Facility: CLINIC | Age: 87
End: 2022-01-27

## 2022-01-27 ENCOUNTER — HOSPITAL ENCOUNTER (OUTPATIENT)
Dept: MRI IMAGING | Facility: HOSPITAL | Age: 87
Discharge: HOME | End: 2022-01-27
Admitting: NURSE PRACTITIONER

## 2022-01-27 VITALS — SYSTOLIC BLOOD PRESSURE: 142 MMHG | DIASTOLIC BLOOD PRESSURE: 74 MMHG | HEART RATE: 71 BPM | OXYGEN SATURATION: 94 %

## 2022-01-27 DIAGNOSIS — R93.89 ABNORMAL X-RAY: ICD-10-CM

## 2022-01-27 DIAGNOSIS — G89.29 CHRONIC LEFT-SIDED LOW BACK PAIN WITH LEFT-SIDED SCIATICA: ICD-10-CM

## 2022-01-27 DIAGNOSIS — M54.42 CHRONIC LEFT-SIDED LOW BACK PAIN WITH LEFT-SIDED SCIATICA: ICD-10-CM

## 2022-01-27 LAB — BACTERIA SPEC AEROBE CULT: ABNORMAL

## 2022-01-27 PROCEDURE — 72148 MRI LUMBAR SPINE W/O DYE: CPT

## 2022-01-27 NOTE — TELEPHONE ENCOUNTER
----- Message from YUE Giron sent at 1/27/2022  8:57 AM EST -----  Culture positive; should be susceptible to cephalosporins. Please check and see how the patient is doing.

## 2022-01-27 NOTE — TELEPHONE ENCOUNTER
----- Message from YUE Giron sent at 1/25/2022  5:56 PM EST -----  No acute anemia; no abnormal results related to abdominal pain.

## 2022-01-28 ENCOUNTER — TELEPHONE (OUTPATIENT)
Dept: FAMILY MEDICINE CLINIC | Facility: CLINIC | Age: 87
End: 2022-01-28

## 2022-01-28 DIAGNOSIS — I10 HYPERTENSION, ESSENTIAL: Primary | ICD-10-CM

## 2022-01-28 RX ORDER — LOSARTAN POTASSIUM 25 MG/1
25 TABLET ORAL DAILY
Qty: 90 TABLET | Refills: 1 | Status: SHIPPED | OUTPATIENT
Start: 2022-01-28 | End: 2022-06-10

## 2022-01-28 NOTE — TELEPHONE ENCOUNTER
Called and left a detailed voicemail message informing patient of CT Scan appt on Feb 1st at 12:30pm at the hospital. She is to drink the oral contrast prior to the appointment and she is not to eat or drink anything 2 hrs prior to CT Scan. I also called her son, Theo (per PHI consent), and informed him of his mother's appointment. He stated he will be seeing her this weekend and will remind her of her appointment.

## 2022-01-31 ENCOUNTER — TELEPHONE (OUTPATIENT)
Dept: FAMILY MEDICINE CLINIC | Facility: CLINIC | Age: 87
End: 2022-01-31

## 2022-01-31 DIAGNOSIS — M54.50 LOW BACK PAIN, UNSPECIFIED BACK PAIN LATERALITY, UNSPECIFIED CHRONICITY, UNSPECIFIED WHETHER SCIATICA PRESENT: Primary | ICD-10-CM

## 2022-01-31 RX ORDER — NEBIVOLOL 10 MG/1
TABLET ORAL
Qty: 90 TABLET | Refills: 2 | Status: SHIPPED | OUTPATIENT
Start: 2022-01-31 | End: 2022-04-13 | Stop reason: SDUPTHER

## 2022-02-01 ENCOUNTER — TELEPHONE (OUTPATIENT)
Dept: FAMILY MEDICINE CLINIC | Facility: CLINIC | Age: 87
End: 2022-02-01

## 2022-02-01 ENCOUNTER — HOSPITAL ENCOUNTER (OUTPATIENT)
Dept: CT IMAGING | Facility: HOSPITAL | Age: 87
Discharge: HOME | End: 2022-02-01
Admitting: PHYSICIAN ASSISTANT

## 2022-02-01 DIAGNOSIS — R10.30 LOWER ABDOMINAL PAIN: ICD-10-CM

## 2022-02-01 LAB — CREAT BLDA-MCNC: 1.4 MG/DL

## 2022-02-01 PROCEDURE — 74177 CT ABD & PELVIS W/CONTRAST: CPT

## 2022-02-01 PROCEDURE — 82565 ASSAY OF CREATININE: CPT

## 2022-02-01 PROCEDURE — 0 IOPAMIDOL PER 1 ML: Performed by: PHYSICIAN ASSISTANT

## 2022-02-01 RX ADMIN — IOPAMIDOL 100 ML: 755 INJECTION, SOLUTION INTRAVENOUS at 16:12

## 2022-02-01 NOTE — TELEPHONE ENCOUNTER
----- Message from JACKLYN Cedillo sent at 1/31/2022 11:25 AM EST -----  Moderate to Severe Degenerative changes of the lumbar spine at multiple levels which have progressed since her previous lumbar spine MRI in 2011. Refer to . We referred her to Pain Management last visit can we make sure she has an appt

## 2022-02-01 NOTE — TELEPHONE ENCOUNTER
Told patient information and that the hub will be calling her to set up an appointment with Dr. Romo

## 2022-02-03 ENCOUNTER — TELEPHONE (OUTPATIENT)
Dept: FAMILY MEDICINE CLINIC | Facility: CLINIC | Age: 87
End: 2022-02-03

## 2022-02-03 ENCOUNTER — TELEPHONE (OUTPATIENT)
Dept: CARDIOLOGY | Facility: CLINIC | Age: 87
End: 2022-02-03

## 2022-02-03 NOTE — TELEPHONE ENCOUNTER
Patient was concerned that she had an MRI and that it may have done something to her pacemaker,  I reviewed her home monitoring and everything looks normal.    The rep is also there during the MRI testing and they check device.  She acknowledged understanding.

## 2022-02-03 NOTE — TELEPHONE ENCOUNTER
-told patient results and instructions. She states she feels some better.     ---- Message from YUE Giron sent at 2/2/2022 10:14 AM EST -----  Please notify patient of the following results:  --heart enlargement without recent change  --bilateral small kidney stones; no obstruction  --increase stool in rectum/constipation  How is patient feeling (treated for UTI)? She should increase water and fiber; may even try OTC Colace twice daily (stool softener) to help with stool in rectum.

## 2022-02-08 ENCOUNTER — APPOINTMENT (OUTPATIENT)
Dept: CT IMAGING | Facility: HOSPITAL | Age: 87
End: 2022-02-08

## 2022-02-14 DIAGNOSIS — E03.9 HYPOTHYROIDISM, UNSPECIFIED TYPE: ICD-10-CM

## 2022-02-14 RX ORDER — LEVOTHYROXINE SODIUM 0.05 MG/1
50 TABLET ORAL DAILY
Qty: 90 TABLET | Refills: 1 | Status: SHIPPED | OUTPATIENT
Start: 2022-02-14 | End: 2022-06-13

## 2022-02-14 NOTE — TELEPHONE ENCOUNTER
Incoming Refill Request      Medication requested (name and dose): levothyroxine (SYNTHROID, LEVOTHROID) 50 MCG tablet Take 1 tablet by mouth Daily    Pharmacy where request should be sent: [facility]    Additional details provided by patient: 90 day supply - Pt is completely out    Best call back number: [phone]    Does the patient have less than a 3 day supply:  [x] Yes  [] No    Verito Smith, [employer] Rep  02/14/22, 11:45 EST

## 2022-02-15 ENCOUNTER — OFFICE VISIT (OUTPATIENT)
Dept: FAMILY MEDICINE CLINIC | Facility: CLINIC | Age: 87
End: 2022-02-15

## 2022-02-15 ENCOUNTER — LAB (OUTPATIENT)
Dept: LAB | Facility: HOSPITAL | Age: 87
End: 2022-02-15

## 2022-02-15 ENCOUNTER — OFFICE VISIT (OUTPATIENT)
Dept: GASTROENTEROLOGY | Facility: CLINIC | Age: 87
End: 2022-02-15

## 2022-02-15 VITALS
OXYGEN SATURATION: 98 % | HEIGHT: 62 IN | WEIGHT: 100 LBS | BODY MASS INDEX: 18.4 KG/M2 | HEART RATE: 77 BPM | DIASTOLIC BLOOD PRESSURE: 83 MMHG | SYSTOLIC BLOOD PRESSURE: 140 MMHG

## 2022-02-15 VITALS
BODY MASS INDEX: 18.33 KG/M2 | WEIGHT: 99.6 LBS | DIASTOLIC BLOOD PRESSURE: 86 MMHG | SYSTOLIC BLOOD PRESSURE: 114 MMHG | OXYGEN SATURATION: 98 % | HEART RATE: 76 BPM | HEIGHT: 62 IN

## 2022-02-15 DIAGNOSIS — R35.0 URINARY FREQUENCY: Primary | ICD-10-CM

## 2022-02-15 DIAGNOSIS — K59.00 CONSTIPATION, UNSPECIFIED CONSTIPATION TYPE: Primary | ICD-10-CM

## 2022-02-15 DIAGNOSIS — N39.0 URINARY TRACT INFECTION WITH HEMATURIA, SITE UNSPECIFIED: ICD-10-CM

## 2022-02-15 DIAGNOSIS — K92.1 BLOOD IN STOOL: ICD-10-CM

## 2022-02-15 DIAGNOSIS — R31.9 URINARY TRACT INFECTION WITH HEMATURIA, SITE UNSPECIFIED: ICD-10-CM

## 2022-02-15 DIAGNOSIS — L60.0 INGROWN TOENAIL: ICD-10-CM

## 2022-02-15 DIAGNOSIS — G89.29 CHRONIC LOW BACK PAIN WITH SCIATICA, SCIATICA LATERALITY UNSPECIFIED, UNSPECIFIED BACK PAIN LATERALITY: ICD-10-CM

## 2022-02-15 DIAGNOSIS — K21.9 GASTROESOPHAGEAL REFLUX DISEASE WITHOUT ESOPHAGITIS: ICD-10-CM

## 2022-02-15 DIAGNOSIS — M54.40 CHRONIC LOW BACK PAIN WITH SCIATICA, SCIATICA LATERALITY UNSPECIFIED, UNSPECIFIED BACK PAIN LATERALITY: ICD-10-CM

## 2022-02-15 DIAGNOSIS — K64.9 HEMORRHOIDS, UNSPECIFIED HEMORRHOID TYPE: ICD-10-CM

## 2022-02-15 PROBLEM — M54.16 LUMBAR RADICULOPATHY: Status: ACTIVE | Noted: 2022-02-15

## 2022-02-15 PROBLEM — M16.12 OSTEOARTHRITIS OF LEFT HIP: Status: ACTIVE | Noted: 2022-02-15

## 2022-02-15 PROBLEM — M51.36 DEGENERATION OF LUMBAR INTERVERTEBRAL DISC: Status: ACTIVE | Noted: 2022-02-15

## 2022-02-15 PROBLEM — Z79.4 ENCOUNTER FOR LONG-TERM (CURRENT) USE OF INSULIN: Status: ACTIVE | Noted: 2022-02-15

## 2022-02-15 LAB
BASOPHILS # BLD AUTO: 0.06 10*3/MM3 (ref 0–0.2)
BASOPHILS NFR BLD AUTO: 0.8 % (ref 0–1.5)
BILIRUB BLD-MCNC: NEGATIVE MG/DL
CLARITY, POC: ABNORMAL
COLOR UR: YELLOW
DEPRECATED RDW RBC AUTO: 48.2 FL (ref 37–54)
EOSINOPHIL # BLD AUTO: 0.18 10*3/MM3 (ref 0–0.4)
EOSINOPHIL NFR BLD AUTO: 2.4 % (ref 0.3–6.2)
ERYTHROCYTE [DISTWIDTH] IN BLOOD BY AUTOMATED COUNT: 12.5 % (ref 12.3–15.4)
EXPIRATION DATE: ABNORMAL
FERRITIN SERPL-MCNC: 221 NG/ML (ref 13–150)
GLUCOSE UR STRIP-MCNC: NEGATIVE MG/DL
HCT VFR BLD AUTO: 41.6 % (ref 34–46.6)
HGB BLD-MCNC: 14 G/DL (ref 12–15.9)
IRON 24H UR-MRATE: 93 MCG/DL (ref 37–145)
IRON SATN MFR SERPL: 27 % (ref 20–50)
KETONES UR QL: NEGATIVE
LEUKOCYTE EST, POC: ABNORMAL
LYMPHOCYTES # BLD AUTO: 0.99 10*3/MM3 (ref 0.7–3.1)
LYMPHOCYTES NFR BLD AUTO: 13.4 % (ref 19.6–45.3)
Lab: ABNORMAL
MCH RBC QN AUTO: 35.4 PG (ref 26.6–33)
MCHC RBC AUTO-ENTMCNC: 33.7 G/DL (ref 31.5–35.7)
MCV RBC AUTO: 105.1 FL (ref 79–97)
MONOCYTES # BLD AUTO: 0.69 10*3/MM3 (ref 0.1–0.9)
MONOCYTES NFR BLD AUTO: 9.4 % (ref 5–12)
NEUTROPHILS NFR BLD AUTO: 5.42 10*3/MM3 (ref 1.7–7)
NEUTROPHILS NFR BLD AUTO: 73.6 % (ref 42.7–76)
NITRITE UR-MCNC: POSITIVE MG/ML
PH UR: 5 [PH] (ref 5–8)
PLATELET # BLD AUTO: 244 10*3/MM3 (ref 140–450)
PMV BLD AUTO: 11.4 FL (ref 6–12)
PROT UR STRIP-MCNC: ABNORMAL MG/DL
RBC # BLD AUTO: 3.96 10*6/MM3 (ref 3.77–5.28)
RBC # UR STRIP: ABNORMAL /UL
SP GR UR: 1.02 (ref 1–1.03)
TIBC SERPL-MCNC: 340 MCG/DL (ref 298–536)
TRANSFERRIN SERPL-MCNC: 228 MG/DL (ref 200–360)
UROBILINOGEN UR QL: NORMAL
WBC NRBC COR # BLD: 7.37 10*3/MM3 (ref 3.4–10.8)

## 2022-02-15 PROCEDURE — 87086 URINE CULTURE/COLONY COUNT: CPT | Performed by: NURSE PRACTITIONER

## 2022-02-15 PROCEDURE — 87186 SC STD MICRODIL/AGAR DIL: CPT | Performed by: NURSE PRACTITIONER

## 2022-02-15 PROCEDURE — 82728 ASSAY OF FERRITIN: CPT

## 2022-02-15 PROCEDURE — 87077 CULTURE AEROBIC IDENTIFY: CPT | Performed by: NURSE PRACTITIONER

## 2022-02-15 PROCEDURE — 84466 ASSAY OF TRANSFERRIN: CPT

## 2022-02-15 PROCEDURE — 99214 OFFICE O/P EST MOD 30 MIN: CPT | Performed by: NURSE PRACTITIONER

## 2022-02-15 PROCEDURE — 85025 COMPLETE CBC W/AUTO DIFF WBC: CPT

## 2022-02-15 PROCEDURE — 81003 URINALYSIS AUTO W/O SCOPE: CPT | Performed by: NURSE PRACTITIONER

## 2022-02-15 PROCEDURE — 83540 ASSAY OF IRON: CPT

## 2022-02-15 PROCEDURE — 36415 COLL VENOUS BLD VENIPUNCTURE: CPT

## 2022-02-15 PROCEDURE — 99214 OFFICE O/P EST MOD 30 MIN: CPT | Performed by: INTERNAL MEDICINE

## 2022-02-15 RX ORDER — HYDROCHLOROTHIAZIDE 12.5 MG/1
12.5 TABLET ORAL DAILY
COMMUNITY
End: 2022-04-13

## 2022-02-15 RX ORDER — FAMOTIDINE 10 MG
10 TABLET ORAL 2 TIMES DAILY
COMMUNITY
End: 2022-04-13 | Stop reason: ALTCHOICE

## 2022-02-15 NOTE — PROGRESS NOTES
Chief Complaint  Bloated (Patient has this problem when she eats anything, stools have been black. ), Nose Bleed, Urinary Frequency, Ingrown Toenails (on both big toes, patient saw Dr. Murrieta but does not want to see him anymore. ), and Balance Issues (Ever since she had heart surgery in December of 2017. )    Subjective          Fabiana Herbert presents to [facility]  History of Present Illness     Admits urinary freq and urgency. Denies dysuria, fever, chills, body aches, back pain. UA today in the office positive for blood, protein, nitrates and leukocytes. Sent urine off for culture.    States she is having trouble with abdominal bloating, weight loss and black stools. States her stools are semi formed. Reports her last colonoscopy was several yrs ago . She had 18 inches of her colon removed due to diverticulitis/blockage by . Last CBC 1/22 H/H 14.6/44. She has an appt today with . Discussed need for colonoscopy due to bowel changes listed above.    CT abdomen/pelvis on 2/1/22. Mild cardiomegaly with predominant enlargement of the right atrium, not significantly changed  2. Bilateral nonobstructing renal stones, as above  3. Previous hysterectomy  4. Previous right colectomy  5. Moderate amount of stool noted in the rectum and transverse colon    States Karley morning she woke up with back pain-states the pain radiated down her left leg. She went to [facility] but left before being seen due to the wait. She went to [facility] and had a MRI lumbar-she was told she has severe arthritis in her back. She is scheduled for epidurals next month. She was prescribed hydrocodone but states she took it and it made her double vision and dizzy so she has not taken it anymore.     Ingrown toenails-she saw  but prefers to see someone else. [facility].    She is seeing Nicolas this afternoon for her abdominal pain, wt  "loss, bloating and rectal bleeding    She has labs ordered in the computer that was ordered for Oct-reminded pt of labs. Also added CBC, iron profile and ferritin due to blood in stools.     She  has a past medical history of Anemia, Anxiety, Arthritis, Back pain, Cramps, muscle, general, Degenerative lumbar disc (05/04/2014), Difficulty in swallowing, Dizziness, Dysuria (05/24/2019), EKG abnormality (08/12/2016), Fatigue, Headache, Hearing loss, Hyperlipidemia, Hypertension, essential, Hypothyroidism, Imbalance, Incontinence in female, Ingrowing toenail, Insomnia, Iron deficiency anemia (02/01/2017), Joint pain, Leg pain, Lightheadedness (03/14/2018), Low back pain (06/09/2014), Lumbar degenerative disc disease (02/01/2017), Macular degeneration, Mitral valve disorder, Mitral valve replaced (12/07/2017), Nocturia (05/24/2019), Pacemaker (02/23/2018), Peptic ulcer disease (02/01/2017), Ringing in ear, Sciatica (01/09/2015), Shortness of breath, Urethral caruncle (05/24/2019), Vision changes, and Weakness.     Objective   Vital Signs:   /86   Pulse 76   Ht 157.5 cm (62\")   Wt 45.2 kg (99 lb 9.6 oz)   SpO2 98%   BMI 18.22 kg/m²     Physical Exam  Constitutional:       Appearance: Normal appearance.   Neck:      Thyroid: No thyroid mass, thyromegaly or thyroid tenderness.      Vascular: No carotid bruit.   Cardiovascular:      Rate and Rhythm: Normal rate and regular rhythm.      Pulses: Normal pulses.      Heart sounds: Normal heart sounds.   Pulmonary:      Effort: Pulmonary effort is normal.      Breath sounds: Normal breath sounds.   Musculoskeletal:      Right lower leg: No edema.      Left lower leg: No edema.   Skin:     General: Skin is warm and dry.   Neurological:      General: No focal deficit present.      Mental Status: She is alert and oriented to person, place, and time.   Psychiatric:         Mood and Affect: Mood normal.         Behavior: Behavior normal.        Result Review :   The " following data was reviewed by: JACKLYN Cedillo on 02/15/2022:  CMP    CMP 12/29/21 1/24/22 2/1/22   Glucose 123 (A) 97    BUN 28 (A) 28 (A)    Creatinine 1.40 (A) 1.60 (A) 1.40   eGFR Non  Am 36 (A) 30 (A)    Sodium 139 140    Potassium 4.8 4.1    Chloride 101 101    Calcium 10.3 10.4    Albumin 4.60 4.60    Total Bilirubin 0.5 0.4    Alkaline Phosphatase 56 89    AST (SGOT) 19 24    ALT (SGPT) 13 13    (A) Abnormal value       Comments are available for some flowsheets but are not being displayed.           CBC    CBC 3/15/21 12/29/21 1/24/22   WBC 5.39 8.70 8.08   RBC 4.04 (A) 4.04 4.12   Hemoglobin 13.4 14.0 14.6   Hematocrit 41.3 42.1 44.0   .2 (A) 104.2 (A) 106.8 (A)   MCH 33.2 (A) 34.7 (A) 35.4 (A)   MCHC 32.4 (A) 33.3 33.2   RDW 12.2 12.3 12.2 (A)   Platelets 198 185 231   (A) Abnormal value            Lipid Panel    Lipid Panel 10/5/21   Total Cholesterol 141   Triglycerides 170 (A)   HDL Cholesterol 49   VLDL Cholesterol 29   LDL Cholesterol  63   LDL/HDL Ratio 1.18   (A) Abnormal value            TSH    TSH 2/22/21   TSH 2.580           Data reviewed: CT abd/pelvis          Past Surgical History:   Procedure Laterality Date   • APPENDECTOMY  1951   • BREAST BIOPSY Left 1955   • CATARACT EXTRACTION WITH INTRAOCULAR LENS IMPLANT Bilateral     1992, 2001   • COLON SURGERY  2018    Dr. Alonso, Moderate Diverticulosis   • COLONOSCOPY     • CYSTOSCOPY  02/18/2020    excision of urethral prolapse w/ Dr. Ibanez   • CYSTOSCOPY  02/18/2020    Excision of Urethral Prolapse w/ Dr. Ibanez   • ENDOSCOPY  2018   • HEMORRHOIDECTOMY      2009/ 2014 hemorrhoid banding    • HYSTERECTOMY  1960    total    • LUMBAR LAMINECTOMY  07/2007   • MITRAL VALVE REPLACEMENT  11/17/2016    Dr. Singh in [place]   • PACEMAKER IMPLANTATION  11/2016    MRI compatible   • WRIST SURGERY Left       Family History   Problem Relation Age of Onset   • Heart disease Father    • Diabetes Father    • Heart disease Sister     • Diabetes Sister    • Heart disease Other    • Heart disease Other         Current Outpatient Medications:   •  Bacillus Coagulans-Inulin (Probiotic) 1-250 BILLION-MG capsule, Probiotic 15 billion cell oral capsule take 2 capsules by oral route daily   Active, Disp: , Rfl:   •  estradiol (ESTRACE) 0.1 MG/GM vaginal cream, Insert 1 g into the vagina Daily., Disp: 42.5 g, Rfl: 3  •  famotidine (PEPCID) 20 MG tablet, Take 1 tablet by mouth Daily As Needed for Heartburn., Disp: 90 tablet, Rfl: 1  •  hydrOXYzine (ATARAX) 25 MG tablet, Take 1 tab PO bid prn itching, Disp: 60 tablet, Rfl: 2  •  levothyroxine (SYNTHROID, LEVOTHROID) 50 MCG tablet, Take 1 tablet by mouth Daily., Disp: 90 tablet, Rfl: 1  •  lidocaine (LIDODERM) 5 %, Place 1 patch on the skin as directed by provider Daily. Remove & Discard patch within 12 hours or as directed by MD, Disp: 30 patch, Rfl: 2  •  LORazepam (ATIVAN) 0.5 MG tablet, Take 1 tablet by mouth Daily As Needed for Anxiety., Disp: 90 tablet, Rfl: 0  •  losartan (COZAAR) 25 MG tablet, Take 1 tablet by mouth Daily., Disp: 90 tablet, Rfl: 1  •  mupirocin (BACTROBAN) 2 % ointment, APPLY SMALL AMOUNT TOPICALLY TO THE AFFECTED AREA THREE TIMES DAILY, Disp: 22 g, Rfl: 1  •  ondansetron (ZOFRAN) 4 MG tablet, Take 1 tablet by mouth Every 8 (Eight) Hours As Needed for Nausea or Vomiting., Disp: 30 tablet, Rfl: 2  •  pantoprazole (PROTONIX) 40 MG EC tablet, Take 1 tablet by mouth Daily., Disp: 30 tablet, Rfl: 3  •  polyethylene glycol (MiraLax) 17 GM/SCOOP powder, 17 g., Disp: , Rfl:   •  Probiotic Product (PROBIOTIC BLEND PO), Probiotic, Disp: , Rfl:   •  spironolactone (ALDACTONE) 25 MG tablet, 25 mg Daily., Disp: , Rfl:   •  hydroCHLOROthiazide (MICROZIDE) 12.5 MG capsule, Take 1 capsule by mouth Daily., Disp: 90 capsule, Rfl: 1  •  nebivolol (BYSTOLIC) 10 MG tablet, TAKE1 TABLET BY MOUTH EVERY NIGHT AT BEDTIME, Disp: 90 tablet, Rfl: 2   Assessment and Plan    Diagnoses and all orders for this  visit:    1. Urinary frequency (Primary)  -     POCT urinalysis dipstick, automated  -     Urine Culture - Urine, Urine, Clean Catch; Future  -     Urine Culture - Urine, Urine, Clean Catch    2. Blood in stool  Comments:  admits wt loss, change in stools-dark black loose stools, bloating-CT abd/pelvis 2/21/22 showed mod colonic stool burden-she has appt w/GI today  Orders:  -     CBC w AUTO Differential; Future  -     Iron Profile; Future  -     Ferritin; Future    3. Urinary tract infection with hematuria, site unspecified  Comments:  positive for blood, protein, nitrates and leukocytes. Sent urine off for culture.    4. Ingrown toenail  -     Ambulatory Referral to Podiatry    5. Chronic low back pain with sciatica, sciatica laterality unspecified, unspecified back pain laterality  Comments:  she is seeing [facility] for [facility] pain-she will start epidurals next month.         Follow Up   Return in about 4 weeks (around 3/15/2022).  Patient was given instructions and counseling regarding her condition or for health maintenance advice. Please see specific information pulled into the AVS if appropriate.     Fabiana PEGGY Herbert  reports that she has never smoked. She has never used smokeless tobacco..            JACKLYN Cedillo    The patient is advised to continue current medications.

## 2022-02-16 DIAGNOSIS — D51.9 ANEMIA DUE TO VITAMIN B12 DEFICIENCY, UNSPECIFIED B12 DEFICIENCY TYPE: Primary | ICD-10-CM

## 2022-02-17 LAB — BACTERIA SPEC AEROBE CULT: ABNORMAL

## 2022-02-18 ENCOUNTER — TELEPHONE (OUTPATIENT)
Dept: FAMILY MEDICINE CLINIC | Facility: CLINIC | Age: 87
End: 2022-02-18

## 2022-02-18 DIAGNOSIS — R35.0 URINARY FREQUENCY: Primary | ICD-10-CM

## 2022-02-18 RX ORDER — CEPHALEXIN 500 MG/1
500 CAPSULE ORAL 2 TIMES DAILY
Qty: 14 CAPSULE | Refills: 0 | Status: SHIPPED | OUTPATIENT
Start: 2022-02-18 | End: 2022-02-21 | Stop reason: SDUPTHER

## 2022-02-18 NOTE — TELEPHONE ENCOUNTER
----- Message from JACKLYN Cedillo sent at 2/17/2022  7:37 PM EST -----  Urine culture positive for Klebsiella pneumoniae-send in cephalexin (keflex) 250mg bid x 7 days, disp 14 tabs

## 2022-02-18 NOTE — TELEPHONE ENCOUNTER
Phoned patient after contacting son emergency contact he gave me patients home number [phone] called and gave patient results and instructions.

## 2022-02-18 NOTE — TELEPHONE ENCOUNTER
Phoned patient to advise of results but voicemail box not set up medication has been sent to the pharmacy.

## 2022-02-21 DIAGNOSIS — N95.2 ATROPHIC VAGINITIS: ICD-10-CM

## 2022-02-21 DIAGNOSIS — R35.0 URINARY FREQUENCY: ICD-10-CM

## 2022-02-21 RX ORDER — CEPHALEXIN 500 MG/1
500 CAPSULE ORAL 2 TIMES DAILY
Qty: 14 CAPSULE | Refills: 0 | Status: SHIPPED | OUTPATIENT
Start: 2022-02-21 | End: 2022-02-28

## 2022-02-21 RX ORDER — ESTRADIOL 0.1 MG/G
1 CREAM VAGINAL DAILY
Qty: 42.5 G | Refills: 3 | Status: SHIPPED | OUTPATIENT
Start: 2022-02-21 | End: 2022-06-15 | Stop reason: SDUPTHER

## 2022-03-04 ENCOUNTER — LAB (OUTPATIENT)
Dept: FAMILY MEDICINE CLINIC | Facility: CLINIC | Age: 87
End: 2022-03-04

## 2022-03-04 DIAGNOSIS — R35.0 URINARY FREQUENCY: Primary | ICD-10-CM

## 2022-03-04 LAB
BILIRUB BLD-MCNC: NEGATIVE MG/DL
CLARITY, POC: ABNORMAL
COLOR UR: YELLOW
EXPIRATION DATE: ABNORMAL
GLUCOSE UR STRIP-MCNC: NEGATIVE MG/DL
KETONES UR QL: ABNORMAL
LEUKOCYTE EST, POC: NEGATIVE
Lab: ABNORMAL
NITRITE UR-MCNC: NEGATIVE MG/ML
PH UR: 5.5 [PH] (ref 5–8)
PROT UR STRIP-MCNC: ABNORMAL MG/DL
RBC # UR STRIP: NEGATIVE /UL
SP GR UR: 1.02 (ref 1–1.03)
UROBILINOGEN UR QL: NORMAL

## 2022-03-04 PROCEDURE — 81003 URINALYSIS AUTO W/O SCOPE: CPT | Performed by: NURSE PRACTITIONER

## 2022-03-18 ENCOUNTER — OFFICE VISIT (OUTPATIENT)
Dept: FAMILY MEDICINE CLINIC | Facility: CLINIC | Age: 87
End: 2022-03-18

## 2022-03-18 VITALS
OXYGEN SATURATION: 98 % | WEIGHT: 101 LBS | BODY MASS INDEX: 18.58 KG/M2 | DIASTOLIC BLOOD PRESSURE: 88 MMHG | SYSTOLIC BLOOD PRESSURE: 143 MMHG | HEIGHT: 62 IN | HEART RATE: 72 BPM

## 2022-03-18 DIAGNOSIS — M54.9 CHRONIC BACK PAIN, UNSPECIFIED BACK LOCATION, UNSPECIFIED BACK PAIN LATERALITY: ICD-10-CM

## 2022-03-18 DIAGNOSIS — K21.9 GASTROESOPHAGEAL REFLUX DISEASE, UNSPECIFIED WHETHER ESOPHAGITIS PRESENT: ICD-10-CM

## 2022-03-18 DIAGNOSIS — E55.9 VITAMIN D DEFICIENCY: ICD-10-CM

## 2022-03-18 DIAGNOSIS — K92.1 BLACK STOOLS: ICD-10-CM

## 2022-03-18 DIAGNOSIS — E53.8 VITAMIN B12 DEFICIENCY: ICD-10-CM

## 2022-03-18 DIAGNOSIS — E03.9 HYPOTHYROIDISM, UNSPECIFIED TYPE: Primary | ICD-10-CM

## 2022-03-18 DIAGNOSIS — R51.9 FREQUENT HEADACHES: ICD-10-CM

## 2022-03-18 DIAGNOSIS — Z13.220 SCREENING FOR LIPID DISORDERS: ICD-10-CM

## 2022-03-18 DIAGNOSIS — D50.9 IRON DEFICIENCY ANEMIA, UNSPECIFIED IRON DEFICIENCY ANEMIA TYPE: ICD-10-CM

## 2022-03-18 DIAGNOSIS — R13.10 DYSPHAGIA, UNSPECIFIED TYPE: ICD-10-CM

## 2022-03-18 DIAGNOSIS — I10 HYPERTENSION, ESSENTIAL: ICD-10-CM

## 2022-03-18 DIAGNOSIS — G89.29 CHRONIC BACK PAIN, UNSPECIFIED BACK LOCATION, UNSPECIFIED BACK PAIN LATERALITY: ICD-10-CM

## 2022-03-18 DIAGNOSIS — R59.1 LYMPHADENOPATHY: ICD-10-CM

## 2022-03-18 DIAGNOSIS — M25.559 HIP PAIN: ICD-10-CM

## 2022-03-18 PROCEDURE — 99214 OFFICE O/P EST MOD 30 MIN: CPT | Performed by: NURSE PRACTITIONER

## 2022-03-18 RX ORDER — PANTOPRAZOLE SODIUM 40 MG/1
TABLET, DELAYED RELEASE ORAL
COMMUNITY
End: 2022-04-13 | Stop reason: ALTCHOICE

## 2022-03-18 RX ORDER — HYDROCODONE BITARTRATE AND ACETAMINOPHEN 5; 325 MG/1; MG/1
TABLET ORAL
COMMUNITY
Start: 2022-03-03 | End: 2022-04-13 | Stop reason: ALTCHOICE

## 2022-03-18 RX ORDER — CEPHALEXIN 500 MG/1
CAPSULE ORAL
COMMUNITY
End: 2022-05-06

## 2022-03-18 RX ORDER — CEPHALEXIN 500 MG/1
CAPSULE ORAL
COMMUNITY
Start: 2022-02-25 | End: 2022-04-13 | Stop reason: SDUPTHER

## 2022-03-18 NOTE — PROGRESS NOTES
Chief Complaint  Headache, Back Pain, and Hypothyroidism    Subjective          Fabiana Herbert presents to [facility]  Pt here today for one month follow up.     Pt states seen pain management 03/08/22 and they did an epidural injection in her left hip. Reports she almost had immediate relief. Pt reports still having back pain but has another upcoming appointment. The doctor discussed doing another injection in the mid back.     Pt will see Dr. Marshall in [facility].    Pt reports will get headaches daily.     Pt had two ingrown toenails removed recently.     Pt has concerns about her thyroid. States she is having difficulty swallowing-states she cuts her food up small and as long as she does that she is fine. Reports the submandible lymph nodes are still enlarged. Ordered US thyroid    Pt seen Dr. Anderson about her GI symptoms and he does not recommend a colonoscopy at her age. Reports she feels she can control her stomach pain by watching what she is eating. States she feels her reflux is under good control with pantoprazole daily and pepcid prn. She is taking mirilax and stool softer and has a daily bm. States she takes something otc to help with bloating and it works well. States she is still having black stools. She had a UA last wk that was neg for hematuria. Ordered occult stools since she is not a candidate for colonoscopy.                 Past Medical History:   Diagnosis Date   • Anemia    • Anxiety    • Arthritis     spine   • Back pain    • Cramps, muscle, general    • Degenerative lumbar disc 05/04/2014    LUMBAR /LS DISC DEGENERATION   • Difficulty in swallowing    • Dizziness    • Dysuria 05/24/2019   • EKG abnormality 08/12/2016   • Fatigue    • Headache    • Hearing loss    • Hyperlipidemia    • Hypertension, essential    • Hypothyroidism    • Imbalance    • Incontinence in female    • Ingrowing toenail    • Insomnia    • Iron deficiency anemia 02/01/2017   • Joint pain    •  Leg pain    • Lightheadedness 03/14/2018    The patient reports onset of lightheadedness following heart surgery in 2016. She notes that this tends to be brought on by a change in position. I would consider orthostasis as a potential culprit. I will attempt to pursue orthostatic blood pressures but the patient notes that it is hard for her to lay flaat on the exam table. I will request her records be sent for my review.    • Low back pain 06/09/2014   • Lumbar degenerative disc disease 02/01/2017   • Macular degeneration     Dharmesh   • Mitral valve disorder    • Mitral valve replaced 12/07/2017   • Nocturia 05/24/2019   • Pacemaker 02/23/2018   • Peptic ulcer disease 02/01/2017   • Ringing in ear    • Sciatica 01/09/2015   • Shortness of breath    • Urethral caruncle 05/24/2019   • Vision changes    • Weakness       Family History   Problem Relation Age of Onset   • Heart disease Father    • Diabetes Father    • Heart disease Sister    • Diabetes Sister    • Heart disease Other    • Heart disease Other    • Colon cancer Neg Hx       Past Surgical History:   Procedure Laterality Date   • APPENDECTOMY  1951   • BREAST BIOPSY Left 1955   • CATARACT EXTRACTION WITH INTRAOCULAR LENS IMPLANT Bilateral     1992, 2001   • COLON SURGERY  2018    Dr. Alonso, Moderate Diverticulosis   • COLONOSCOPY     • CYSTOSCOPY  02/18/2020    excision of urethral prolapse w/ Dr. Ibanez   • CYSTOSCOPY  02/18/2020    Excision of Urethral Prolapse w/ Dr. Ibanez   • ENDOSCOPY  2018   • HEMORRHOIDECTOMY      2009/ 2014 hemorrhoid banding    • HYSTERECTOMY  1960    total    • LUMBAR LAMINECTOMY  07/2007   • MITRAL VALVE REPLACEMENT  11/17/2016    Dr. Singh in [place]   • PACEMAKER IMPLANTATION  11/2016    MRI compatible   • WRIST SURGERY Left           Current Outpatient Medications:   •  Bacillus Coagulans-Inulin (Probiotic) 1-250 BILLION-MG capsule, Probiotic 15 billion cell oral capsule take 2 capsules by oral route daily    Active, Disp: , Rfl:   •  estradiol (ESTRACE) 0.1 MG/GM vaginal cream, Insert 1 g into the vagina Daily., Disp: 42.5 g, Rfl: 3  •  famotidine (PEPCID) 10 MG tablet, Take 10 mg by mouth 2 (Two) Times a Day., Disp: , Rfl:   •  hydroCHLOROthiazide (HYDRODIURIL) 12.5 MG tablet, Take 12.5 mg by mouth Daily., Disp: , Rfl:   •  HYDROcodone-acetaminophen (NORCO) 5-325 MG per tablet, TAKE 1 TABLET BY MOUTH EVERY 4 TO 6 HOURS, Disp: , Rfl:   •  hydrOXYzine (ATARAX) 25 MG tablet, Take 1 tab PO bid prn itching, Disp: 60 tablet, Rfl: 2  •  levothyroxine (SYNTHROID, LEVOTHROID) 50 MCG tablet, Take 1 tablet by mouth Daily., Disp: 90 tablet, Rfl: 1  •  lidocaine (LIDODERM) 5 %, Place 1 patch on the skin as directed by provider Daily. Remove & Discard patch within 12 hours or as directed by MD, Disp: 30 patch, Rfl: 2  •  LORazepam (ATIVAN) 0.5 MG tablet, Take 1 tablet by mouth Daily As Needed for Anxiety., Disp: 90 tablet, Rfl: 0  •  losartan (COZAAR) 25 MG tablet, Take 1 tablet by mouth Daily., Disp: 90 tablet, Rfl: 1  •  nebivolol (BYSTOLIC) 10 MG tablet, TAKE1 TABLET BY MOUTH EVERY NIGHT AT BEDTIME, Disp: 90 tablet, Rfl: 2  •  ondansetron (ZOFRAN) 4 MG tablet, Take 1 tablet by mouth Every 8 (Eight) Hours As Needed for Nausea or Vomiting., Disp: 30 tablet, Rfl: 2  •  pantoprazole (PROTONIX) 40 MG EC tablet, pantoprazole 40 mg tablet,delayed release  TAKE 1 TABLET BY MOUTH EVERY DAY, Disp: , Rfl:   •  Probiotic Product (PROBIOTIC BLEND PO), Probiotic, Disp: , Rfl:   •  spironolactone (ALDACTONE) 25 MG tablet, 25 mg Daily., Disp: , Rfl:   •  cephalexin (KEFLEX) 500 MG capsule, , Disp: , Rfl:   •  cephalexin (KEFLEX) 500 MG capsule, cephalexin 500 mg capsule  TAKE 1 CAPSULE BY MOUTH TWICE A DAY, Disp: , Rfl:   •  hydrocortisone 2.5 % cream, APPLY TO AFFECTED AREA TWICE A DAY AS DIRECTED, Disp: 28 g, Rfl: 3  •  hydrocortisone 2.5 % cream, hydrocortisone 2.5 % topical cream  APPLY TO AFFECTED AREA TWICE A DAY AS DIRECTED, Disp: , Rfl:  "  •  mupirocin (BACTROBAN) 2 % ointment, APPLY SMALL AMOUNT TOPICALLY TO THE AFFECTED AREA THREE TIMES DAILY, Disp: 22 g, Rfl: 1  •  polyethylene glycol (MIRALAX) 17 GM/SCOOP powder, 17 g., Disp: , Rfl:     Objective     Vital Signs:     /88   Pulse 72   Ht 157.5 cm (62\")   Wt 45.8 kg (101 lb)   LMP  (LMP Unknown)   SpO2 98%   BMI 18.47 kg/m²    Estimated body mass index is 18.47 kg/m² as calculated from the following:    Height as of this encounter: 157.5 cm (62\").    Weight as of this encounter: 45.8 kg (101 lb).     Wt Readings from Last 3 Encounters:   03/18/22 45.8 kg (101 lb)   02/15/22 45.4 kg (100 lb)   02/15/22 45.2 kg (99 lb 9.6 oz)     BP Readings from Last 3 Encounters:   03/18/22 143/88   02/15/22 140/83   02/15/22 114/86       Physical Exam  Constitutional:       Appearance: Normal appearance.   HENT:      Mouth/Throat:      Comments: Submandibular lymphadenopathy b/l  Neck:      Thyroid: No thyroid mass, thyromegaly or thyroid tenderness.      Vascular: No carotid bruit.   Cardiovascular:      Rate and Rhythm: Normal rate and regular rhythm.      Pulses: Normal pulses.      Heart sounds: Normal heart sounds.   Pulmonary:      Effort: Pulmonary effort is normal.      Breath sounds: Normal breath sounds.   Musculoskeletal:      Right lower leg: No edema.      Left lower leg: No edema.   Skin:     General: Skin is warm and dry.   Neurological:      General: No focal deficit present.      Mental Status: She is alert and oriented to person, place, and time.   Psychiatric:         Mood and Affect: Mood normal.         Behavior: Behavior normal.            Result Review :   The following data was reviewed by: JACKYLN Cedillo on 03/18/2022:  CMP    CMP 12/29/21 1/24/22 2/1/22   Glucose 123 (A) 97    BUN 28 (A) 28 (A)    Creatinine 1.40 (A) 1.60 (A) 1.40   eGFR Non  Am 36 (A) 30 (A)    Sodium 139 140    Potassium 4.8 4.1    Chloride 101 101    Calcium 10.3 10.4    Albumin 4.60 " 4.60    Total Bilirubin 0.5 0.4    Alkaline Phosphatase 56 89    AST (SGOT) 19 24    ALT (SGPT) 13 13    (A) Abnormal value       Comments are available for some flowsheets but are not being displayed.           CBC    CBC 12/29/21 1/24/22 2/15/22   WBC 8.70 8.08 7.37   RBC 4.04 4.12 3.96   Hemoglobin 14.0 14.6 14.0   Hematocrit 42.1 44.0 41.6   .2 (A) 106.8 (A) 105.1 (A)   MCH 34.7 (A) 35.4 (A) 35.4 (A)   MCHC 33.3 33.2 33.7   RDW 12.3 12.2 (A) 12.5   Platelets 185 231 244   (A) Abnormal value            CBC w/diff    CBC w/Diff 12/29/21 1/24/22 2/15/22   WBC 8.70 8.08 7.37   RBC 4.04 4.12 3.96   Hemoglobin 14.0 14.6 14.0   Hematocrit 42.1 44.0 41.6   .2 (A) 106.8 (A) 105.1 (A)   MCH 34.7 (A) 35.4 (A) 35.4 (A)   MCHC 33.3 33.2 33.7   RDW 12.3 12.2 (A) 12.5   Platelets 185 231 244   Neutrophil Rel % 81.8 (A)  73.6   Immature Granulocyte Rel % 0.2     Lymphocyte Rel % 10.5 (A)  13.4 (A)   Monocyte Rel % 6.9  9.4   Eosinophil Rel % 0.1 (A)  2.4   Basophil Rel % 0.5  0.8   (A) Abnormal value            Lipid Panel    Lipid Panel 10/5/21   Total Cholesterol 141   Triglycerides 170 (A)   HDL Cholesterol 49   VLDL Cholesterol 29   LDL Cholesterol  63   LDL/HDL Ratio 1.18   (A) Abnormal value                Data reviewed: Consultant notes  progress note                Assessment and Plan      Diagnoses and all orders for this visit:    1. Hypothyroidism, unspecified type (Primary)  -     TSH; Future    2. Frequent headaches    3. Black stools  Comments:  reports she saw  for the GI symptoms and black stools-reports he would not do a colonoscopy due to age. Ordered occult stools   Orders:  -     Occult Blood X 3, Stool - Stool, Per Rectum; Future    4. Hypertension, essential  -     Comprehensive Metabolic Panel; Future    5. Iron deficiency anemia, unspecified iron deficiency anemia type  -     CBC & Differential; Future  -     Iron Profile; Future  -     Ferritin; Future    6. Vitamin B12  deficiency  -     Vitamin B12; Future    7. Vitamin D deficiency  -     Vitamin D 25 Hydroxy; Future    8. Screening for lipid disorders  -     Lipid Panel; Future    9. Lymphadenopathy  -     US Thyroid    10. Dysphagia, unspecified type  Comments:  c/o dysphagia and submandible lymphadenopathy-ordered US thyroid  Orders:  -     US Thyroid    11. Chronic back pain, unspecified back location, unspecified back pain laterality  Comments:  reports Bita is going to do an epidural for her back pain next visit    12. Hip pain  Comments:  reports almost immediates pain relief from steroid inj    13. Gastroesophageal reflux disease, unspecified whether esophagitis present  Comments:  stable w/diet, pantoprazole and pepcid prn         Follow Up {Instructions Charge Capture  Follow-up Communications :23}      Return in about 3 months (around 6/18/2022).    Patient was given instructions and counseling regarding her condition or for health maintenance advice. Please see specific information pulled into the AVS if appropriate.     I have reviewed information obtained and documented by others and I have confirmed the accuracy of this documented note.    Kika Stevenson APRN

## 2022-03-21 ENCOUNTER — LAB (OUTPATIENT)
Dept: LAB | Facility: HOSPITAL | Age: 87
End: 2022-03-21

## 2022-03-21 DIAGNOSIS — I10 HYPERTENSION, ESSENTIAL: ICD-10-CM

## 2022-03-21 DIAGNOSIS — E55.9 VITAMIN D DEFICIENCY: ICD-10-CM

## 2022-03-21 DIAGNOSIS — Z95.2 S/P MVR (MITRAL VALVE REPLACEMENT): ICD-10-CM

## 2022-03-21 DIAGNOSIS — N18.32 STAGE 3B CHRONIC KIDNEY DISEASE: ICD-10-CM

## 2022-03-21 DIAGNOSIS — E53.8 VITAMIN B12 DEFICIENCY: ICD-10-CM

## 2022-03-21 DIAGNOSIS — Z13.220 SCREENING FOR LIPID DISORDERS: ICD-10-CM

## 2022-03-21 DIAGNOSIS — Z95.0 PRESENCE OF CARDIAC PACEMAKER: ICD-10-CM

## 2022-03-21 DIAGNOSIS — I50.32 CHRONIC DIASTOLIC CONGESTIVE HEART FAILURE: ICD-10-CM

## 2022-03-21 DIAGNOSIS — E03.9 HYPOTHYROIDISM, UNSPECIFIED TYPE: ICD-10-CM

## 2022-03-21 DIAGNOSIS — D51.9 ANEMIA DUE TO VITAMIN B12 DEFICIENCY, UNSPECIFIED B12 DEFICIENCY TYPE: ICD-10-CM

## 2022-03-21 DIAGNOSIS — D50.9 IRON DEFICIENCY ANEMIA, UNSPECIFIED IRON DEFICIENCY ANEMIA TYPE: ICD-10-CM

## 2022-03-21 LAB
25(OH)D3 SERPL-MCNC: 88.6 NG/ML (ref 30–100)
ALBUMIN SERPL-MCNC: 4.3 G/DL (ref 3.5–5.2)
ALBUMIN/GLOB SERPL: 1.4 G/DL
ALP SERPL-CCNC: 50 U/L (ref 39–117)
ALT SERPL W P-5'-P-CCNC: 11 U/L (ref 1–33)
ANION GAP SERPL CALCULATED.3IONS-SCNC: 11.4 MMOL/L (ref 5–15)
AST SERPL-CCNC: 16 U/L (ref 1–32)
BILIRUB SERPL-MCNC: 0.5 MG/DL (ref 0–1.2)
BUN SERPL-MCNC: 33 MG/DL (ref 8–23)
BUN/CREAT SERPL: 26.8 (ref 7–25)
CALCIUM SPEC-SCNC: 9.9 MG/DL (ref 8.6–10.5)
CHLORIDE SERPL-SCNC: 105 MMOL/L (ref 98–107)
CHOLEST SERPL-MCNC: 161 MG/DL (ref 0–200)
CO2 SERPL-SCNC: 25.6 MMOL/L (ref 22–29)
CREAT SERPL-MCNC: 1.23 MG/DL (ref 0.57–1)
DEPRECATED RDW RBC AUTO: 47.1 FL (ref 37–54)
EGFRCR SERPLBLD CKD-EPI 2021: 42.6 ML/MIN/1.73
EOSINOPHIL # BLD MANUAL: 0.32 10*3/MM3 (ref 0–0.4)
EOSINOPHIL NFR BLD MANUAL: 5.1 % (ref 0.3–6.2)
ERYTHROCYTE [DISTWIDTH] IN BLOOD BY AUTOMATED COUNT: 12.2 % (ref 12.3–15.4)
FERRITIN SERPL-MCNC: 157 NG/ML (ref 13–150)
GLOBULIN UR ELPH-MCNC: 3.1 GM/DL
GLUCOSE SERPL-MCNC: 85 MG/DL (ref 65–99)
HCT VFR BLD AUTO: 39.1 % (ref 34–46.6)
HDLC SERPL-MCNC: 50 MG/DL (ref 40–60)
HGB BLD-MCNC: 13.3 G/DL (ref 12–15.9)
IRON 24H UR-MRATE: 97 MCG/DL (ref 37–145)
IRON SATN MFR SERPL: 30 % (ref 20–50)
LDLC SERPL CALC-MCNC: 80 MG/DL (ref 0–100)
LDLC/HDLC SERPL: 1.48 {RATIO}
LYMPHOCYTES # BLD MANUAL: 0.94 10*3/MM3 (ref 0.7–3.1)
LYMPHOCYTES NFR BLD MANUAL: 9.1 % (ref 5–12)
MAGNESIUM SERPL-MCNC: 2.2 MG/DL (ref 1.6–2.4)
MCH RBC QN AUTO: 35.9 PG (ref 26.6–33)
MCHC RBC AUTO-ENTMCNC: 34 G/DL (ref 31.5–35.7)
MCV RBC AUTO: 105.7 FL (ref 79–97)
MONOCYTES # BLD: 0.57 10*3/MM3 (ref 0.1–0.9)
NEUTROPHILS # BLD AUTO: 4.39 10*3/MM3 (ref 1.7–7)
NEUTROPHILS NFR BLD MANUAL: 70.7 % (ref 42.7–76)
PLAT MORPH BLD: NORMAL
PLATELET # BLD AUTO: 208 10*3/MM3 (ref 140–450)
PMV BLD AUTO: 11.2 FL (ref 6–12)
POTASSIUM SERPL-SCNC: 4.2 MMOL/L (ref 3.5–5.2)
PROT SERPL-MCNC: 7.4 G/DL (ref 6–8.5)
RBC # BLD AUTO: 3.7 10*6/MM3 (ref 3.77–5.28)
RBC MORPH BLD: NORMAL
SODIUM SERPL-SCNC: 142 MMOL/L (ref 136–145)
T4 FREE SERPL-MCNC: 1.45 NG/DL (ref 0.93–1.7)
TIBC SERPL-MCNC: 328 MCG/DL (ref 298–536)
TRANSFERRIN SERPL-MCNC: 220 MG/DL (ref 200–360)
TRIGL SERPL-MCNC: 185 MG/DL (ref 0–150)
TSH SERPL DL<=0.05 MIU/L-ACNC: 1.78 UIU/ML (ref 0.27–4.2)
VARIANT LYMPHS NFR BLD MANUAL: 15.2 % (ref 19.6–45.3)
VIT B12 BLD-MCNC: 530 PG/ML (ref 211–946)
VLDLC SERPL-MCNC: 31 MG/DL (ref 5–40)
WBC MORPH BLD: NORMAL
WBC NRBC COR # BLD: 6.21 10*3/MM3 (ref 3.4–10.8)

## 2022-03-21 PROCEDURE — 82306 VITAMIN D 25 HYDROXY: CPT

## 2022-03-21 PROCEDURE — 83735 ASSAY OF MAGNESIUM: CPT

## 2022-03-21 PROCEDURE — 83540 ASSAY OF IRON: CPT

## 2022-03-21 PROCEDURE — 82607 VITAMIN B-12: CPT

## 2022-03-21 PROCEDURE — 36415 COLL VENOUS BLD VENIPUNCTURE: CPT

## 2022-03-21 PROCEDURE — 80053 COMPREHEN METABOLIC PANEL: CPT

## 2022-03-21 PROCEDURE — 85007 BL SMEAR W/DIFF WBC COUNT: CPT

## 2022-03-21 PROCEDURE — 80061 LIPID PANEL: CPT

## 2022-03-21 PROCEDURE — 85025 COMPLETE CBC W/AUTO DIFF WBC: CPT

## 2022-03-21 PROCEDURE — 82728 ASSAY OF FERRITIN: CPT

## 2022-03-21 PROCEDURE — 84466 ASSAY OF TRANSFERRIN: CPT

## 2022-03-21 PROCEDURE — 84439 ASSAY OF FREE THYROXINE: CPT

## 2022-03-21 PROCEDURE — 84443 ASSAY THYROID STIM HORMONE: CPT

## 2022-03-22 ENCOUNTER — TELEPHONE (OUTPATIENT)
Dept: FAMILY MEDICINE CLINIC | Facility: CLINIC | Age: 87
End: 2022-03-22

## 2022-03-22 NOTE — TELEPHONE ENCOUNTER
----- Message from JACKLYN Cedillo sent at 3/21/2022 10:18 PM EDT -----  Iron profile, vit B12, vit d,  WNL. Lipid normal w/exception of elevated trig-reduce carb and sugar intake. CMP shows renal insuff-avoid nsaids and increase water intake. TSH WNL. She does have some borderline anemia will continue to monitor

## 2022-03-23 ENCOUNTER — LAB (OUTPATIENT)
Dept: LAB | Facility: HOSPITAL | Age: 87
End: 2022-03-23

## 2022-03-23 DIAGNOSIS — K92.1 BLACK STOOLS: ICD-10-CM

## 2022-03-23 LAB
COLLECT DATE SP2 STL: NORMAL
COLLECT DATE SP3 STL: NORMAL
COLLECT DATE STL: NORMAL
GASTROCULT GAST QL: NEGATIVE
HEMOCCULT SP2 STL QL: NEGATIVE
HEMOCCULT SP3 STL QL: NEGATIVE
Lab: NORMAL

## 2022-03-23 PROCEDURE — 82270 OCCULT BLOOD FECES: CPT

## 2022-03-24 ENCOUNTER — TELEPHONE (OUTPATIENT)
Dept: FAMILY MEDICINE CLINIC | Facility: CLINIC | Age: 87
End: 2022-03-24

## 2022-03-24 NOTE — TELEPHONE ENCOUNTER
----- Message from JACKLYN Cedillo sent at 3/23/2022  9:42 PM EDT -----  Occult stools neg for blood

## 2022-04-07 ENCOUNTER — HOSPITAL ENCOUNTER (OUTPATIENT)
Dept: ULTRASOUND IMAGING | Facility: HOSPITAL | Age: 87
Discharge: HOME | End: 2022-04-07
Admitting: NURSE PRACTITIONER

## 2022-04-07 PROCEDURE — 76536 US EXAM OF HEAD AND NECK: CPT

## 2022-04-11 ENCOUNTER — TELEPHONE (OUTPATIENT)
Dept: FAMILY MEDICINE CLINIC | Facility: CLINIC | Age: 87
End: 2022-04-11

## 2022-04-13 ENCOUNTER — OFFICE VISIT (OUTPATIENT)
Dept: CARDIOLOGY | Facility: CLINIC | Age: 87
End: 2022-04-13

## 2022-04-13 ENCOUNTER — CLINICAL SUPPORT NO REQUIREMENTS (OUTPATIENT)
Dept: CARDIOLOGY | Facility: CLINIC | Age: 87
End: 2022-04-13

## 2022-04-13 ENCOUNTER — TELEPHONE (OUTPATIENT)
Dept: FAMILY MEDICINE CLINIC | Facility: CLINIC | Age: 87
End: 2022-04-13

## 2022-04-13 VITALS
BODY MASS INDEX: 18.58 KG/M2 | WEIGHT: 101 LBS | SYSTOLIC BLOOD PRESSURE: 120 MMHG | HEIGHT: 62 IN | DIASTOLIC BLOOD PRESSURE: 67 MMHG | HEART RATE: 73 BPM

## 2022-04-13 DIAGNOSIS — I44.2 CHB (COMPLETE HEART BLOCK): ICD-10-CM

## 2022-04-13 DIAGNOSIS — R13.10 DYSPHAGIA, UNSPECIFIED TYPE: Primary | ICD-10-CM

## 2022-04-13 DIAGNOSIS — I50.32 CHRONIC DIASTOLIC CONGESTIVE HEART FAILURE: Primary | ICD-10-CM

## 2022-04-13 DIAGNOSIS — Z95.0 PRESENCE OF CARDIAC PACEMAKER: Primary | ICD-10-CM

## 2022-04-13 DIAGNOSIS — I05.9 MITRAL VALVE DISORDER: ICD-10-CM

## 2022-04-13 DIAGNOSIS — E78.5 HYPERLIPIDEMIA, UNSPECIFIED HYPERLIPIDEMIA TYPE: ICD-10-CM

## 2022-04-13 DIAGNOSIS — I10 HYPERTENSION, ESSENTIAL: ICD-10-CM

## 2022-04-13 DIAGNOSIS — Z95.0 PRESENCE OF CARDIAC PACEMAKER: ICD-10-CM

## 2022-04-13 PROCEDURE — 93280 PM DEVICE PROGR EVAL DUAL: CPT | Performed by: INTERNAL MEDICINE

## 2022-04-13 PROCEDURE — 99214 OFFICE O/P EST MOD 30 MIN: CPT | Performed by: INTERNAL MEDICINE

## 2022-04-13 RX ORDER — SPIRONOLACTONE 25 MG/1
25 TABLET ORAL EVERY OTHER DAY
Qty: 45 TABLET | Refills: 3 | Status: SHIPPED | OUTPATIENT
Start: 2022-04-13 | End: 2022-08-29 | Stop reason: SDUPTHER

## 2022-04-13 RX ORDER — NEBIVOLOL 10 MG/1
15 TABLET ORAL NIGHTLY
Qty: 135 TABLET | Refills: 2 | Status: SHIPPED | OUTPATIENT
Start: 2022-04-13 | End: 2023-01-30

## 2022-04-13 NOTE — TELEPHONE ENCOUNTER
Patient came into office to get her test results. Jessica spoke with her and the results were given. However she states that she is still having the same problem. She says that it feels like something is stuck in her throat. She would like a phone call to talk about her next options. Pt's # [phone]. Please advise.

## 2022-04-13 NOTE — PROGRESS NOTES
Office Visit    Chief Complaint  Hypertension, Hyperlipidemia, Pacemaker Check, and Congestive Heart Failure    Subjective            Fabiana Herbert presents to [facility]  Fabiana Herbert is an 87 years old female with hypertension hyperlipidemia permanent pacemaker diastolic heart failure, valvular heart disease, s/p mitral valve replacement who continues to complain of not feeling well she states that these days over the last 6 weeks anytime she tries to exert her heart starts to race and she gets very short of breath.  She denies any definite chest pain      Past Medical History:   Diagnosis Date   • Anemia    • Anxiety    • Arthritis     spine   • Back pain    • Cramps, muscle, general    • Degenerative lumbar disc 05/04/2014    LUMBAR /LS DISC DEGENERATION   • Difficulty in swallowing    • Dizziness    • Dysuria 05/24/2019   • EKG abnormality 08/12/2016   • Fatigue    • Headache    • Hearing loss    • Hyperlipidemia    • Hypertension, essential    • Hypothyroidism    • Imbalance    • Incontinence in female    • Ingrowing toenail    • Insomnia    • Iron deficiency anemia 02/01/2017   • Joint pain    • Leg pain    • Lightheadedness 03/14/2018    The patient reports onset of lightheadedness following heart surgery in 2016. She notes that this tends to be brought on by a change in position. I would consider orthostasis as a potential culprit. I will attempt to pursue orthostatic blood pressures but the patient notes that it is hard for her to lay flaat on the exam table. I will request her records be sent for my review.    • Low back pain 06/09/2014   • Lumbar degenerative disc disease 02/01/2017   • Macular degeneration     Dharmesh   • Mitral valve disorder    • Mitral valve replaced 12/07/2017   • Nocturia 05/24/2019   • Pacemaker 02/23/2018   • Peptic ulcer disease 02/01/2017   • Ringing in ear    • Sciatica 01/09/2015   • Shortness of breath    • Urethral caruncle 05/24/2019   •  Vision changes    • Weakness        Allergies   Allergen Reactions   • Linzess [Linaclotide] Diarrhea   • Duloxetine Unknown - Low Severity   • Zolpidem Unknown - Low Severity   • Zolpidem Tartrate Other (See Comments)   • Aspirin Unknown - Low Severity   • Ciprofloxacin Diarrhea and Unknown - High Severity   • Duloxetine Hcl GI Intolerance   • Ibandronic Acid Unknown - Low Severity   • Levofloxacin Mental Status Change   • Metronidazole Unknown - Low Severity   • Nitrofurantoin Unknown - Low Severity   • Nortriptyline Hcl Delirium   • Pregabalin Unknown - Low Severity   • Ranitidine Hcl Unknown - Low Severity   • Sulfa Antibiotics Hives   • Sulfamethoxazole-Trimethoprim Unknown - Low Severity   • Tramadol Unknown - Low Severity        Past Surgical History:   Procedure Laterality Date   • APPENDECTOMY  1951   • BREAST BIOPSY Left 1955   • CATARACT EXTRACTION WITH INTRAOCULAR LENS IMPLANT Bilateral     1992, 2001   • COLON SURGERY  2018    Dr. Alonso, Moderate Diverticulosis   • COLONOSCOPY     • CYSTOSCOPY  02/18/2020    excision of urethral prolapse w/ Dr. Ibanez   • CYSTOSCOPY  02/18/2020    Excision of Urethral Prolapse w/ Dr. Ibanez   • ENDOSCOPY  2018   • HEMORRHOIDECTOMY      2009/ 2014 hemorrhoid banding    • HYSTERECTOMY  1960    total    • LUMBAR LAMINECTOMY  07/2007   • MITRAL VALVE REPLACEMENT  11/17/2016    Dr. Singh in [place]   • PACEMAKER IMPLANTATION  11/2016    MRI compatible   • WRIST SURGERY Left         Social History     Tobacco Use   • Smoking status: Never Smoker   • Smokeless tobacco: Never Used   Vaping Use   • Vaping Use: Never used   Substance Use Topics   • Alcohol use: Never   • Drug use: Never       Family History   Problem Relation Age of Onset   • Heart disease Father    • Diabetes Father    • Heart disease Sister    • Diabetes Sister    • Heart disease Other    • Heart disease Other    • Colon cancer Neg Hx         Prior to Admission medications    Medication Sig Start Date End  Date Taking? Authorizing Provider   Bacillus Coagulans-Inulin (Probiotic) 1-250 BILLION-MG capsule Probiotic 15 billion cell oral capsule take 2 capsules by oral route daily   Active   Yes Raulito Cota MD   cephalexin (KEFLEX) 500 MG capsule cephalexin 500 mg capsule   TAKE 1 CAPSULE BY MOUTH TWICE A DAY   Yes Raulito Cota MD   estradiol (ESTRACE) 0.1 MG/GM vaginal cream Insert 1 g into the vagina Daily. 2/21/22  Yes Kika Stevenson APRN   hydroCHLOROthiazide (HYDRODIURIL) 12.5 MG tablet Take 12.5 mg by mouth Daily.   Yes Raulito Cota MD   hydrocortisone 2.5 % cream APPLY TO AFFECTED AREA TWICE A DAY AS DIRECTED 3/15/22  Yes Pedrito Rios MD   hydrOXYzine (ATARAX) 25 MG tablet Take 1 tab PO bid prn itching 7/20/21  Yes Kika Stevenson APRN   levothyroxine (SYNTHROID, LEVOTHROID) 50 MCG tablet Take 1 tablet by mouth Daily. 2/14/22  Yes Kika Stevenson APRN   lidocaine (LIDODERM) 5 % Place 1 patch on the skin as directed by provider Daily. Remove & Discard patch within 12 hours or as directed by MD 1/5/22  Yes Kika Stevenson APRN   LORazepam (ATIVAN) 0.5 MG tablet Take 1 tablet by mouth Daily As Needed for Anxiety. 1/21/22  Yes Kika Stevenson APRN   losartan (COZAAR) 25 MG tablet Take 1 tablet by mouth Daily. 1/28/22  Yes Kika Stevenson APRN   mupirocin (BACTROBAN) 2 % ointment APPLY SMALL AMOUNT TOPICALLY TO THE AFFECTED AREA THREE TIMES DAILY 8/27/21  Yes Kika Stevenson APRN   nebivolol (BYSTOLIC) 10 MG tablet TAKE1 TABLET BY MOUTH EVERY NIGHT AT BEDTIME 1/31/22  Yes Peter Hemphill MD   ondansetron (ZOFRAN) 4 MG tablet Take 1 tablet by mouth Every 8 (Eight) Hours As Needed for Nausea or Vomiting. 6/18/21  Yes Kika Stevenson APRN   polyethylene glycol (MIRALAX) 17 GM/SCOOP powder 17 g.   Yes Raulito Cota MD   Probiotic Product (PROBIOTIC BLEND PO) Probiotic   Yes Provider, Historical, MD   spironolactone  "(ALDACTONE) 25 MG tablet Take 25 mg by mouth Every Other Day. 5/12/21  Yes Raulito Cota MD   cephalexin (KEFLEX) 500 MG capsule  2/25/22   Raulito Cota MD   famotidine (PEPCID) 10 MG tablet Take 10 mg by mouth 2 (Two) Times a Day.    Raulito Cota MD   HYDROcodone-acetaminophen (NORCO) 5-325 MG per tablet TAKE 1 TABLET BY MOUTH EVERY 4 TO 6 HOURS 3/3/22   Raulito Cota MD   hydrocortisone 2.5 % cream hydrocortisone 2.5 % topical cream   APPLY TO AFFECTED AREA TWICE A DAY AS DIRECTED    Raulito Cota MD   pantoprazole (PROTONIX) 40 MG EC tablet pantoprazole 40 mg tablet,delayed release   TAKE 1 TABLET BY MOUTH EVERY DAY    Raulito Cota MD        Review of Systems   Constitutional: Positive for fatigue.   Respiratory: Positive for shortness of breath. Negative for cough.    Cardiovascular: Positive for palpitations. Negative for chest pain and leg swelling.   Neurological: Negative for dizziness.        Objective     /67   Pulse 73   Ht 157.5 cm (62\")   Wt 45.8 kg (101 lb)   BMI 18.47 kg/m²       Physical Exam  Constitutional:       General: She is awake.      Appearance: Normal appearance.   Neck:      Thyroid: No thyromegaly.      Vascular: No carotid bruit or JVD.   Cardiovascular:      Rate and Rhythm: Normal rate and regular rhythm.      Chest Wall: PMI is not displaced.      Pulses: Normal pulses.      Heart sounds: S1 normal and S2 normal. Murmur heard.    Systolic murmur is present.    No friction rub. No gallop. No S3 or S4 sounds.   Pulmonary:      Effort: Pulmonary effort is normal.      Breath sounds: Normal breath sounds and air entry. No wheezing, rhonchi or rales.   Abdominal:      General: Bowel sounds are normal.      Palpations: Abdomen is soft. There is no mass.      Tenderness: There is no abdominal tenderness.   Musculoskeletal:      Cervical back: Neck supple.      Right lower leg: No edema.      Left lower leg: No edema. "   Neurological:      Mental Status: She is alert and oriented to person, place, and time.   Psychiatric:         Mood and Affect: Mood normal.         Behavior: Behavior is cooperative.           Result Review :                           Assessment and Plan        Diagnoses and all orders for this visit:    1. Chronic diastolic congestive heart failure (HCC) (Primary)  Assessment & Plan:  Ms. Herbert seems to be in sinus tachycardia with activities and due to her diastolic dysfunction she gets very short of breath.  Her labs indicate that she may have some prerenal azotemia.  I am therefore going to have her stop her HydroDIURIL, continue the spironolactone every other day and increase her nebivolol to 15 mg at bedtime.  She will call us in 2 to 3 weeks and let us know how she is doing      2. Hyperlipidemia, unspecified hyperlipidemia type    3. Hypertension, essential    4. Mitral valve disorder    5. Presence of cardiac pacemaker  Assessment & Plan:  Patient has a dual-chamber pacemaker with about 25 years of battery longevity left.  No episodes recorded.  Normal dual-chamber device function measurement and testing.  68% of the time she is paced in the atria 94% of the time in the right ventricle      Other orders  -     spironolactone (ALDACTONE) 25 MG tablet; Take 1 tablet by mouth Every Other Day.  Dispense: 45 tablet; Refill: 3  -     nebivolol (BYSTOLIC) 10 MG tablet; Take 1.5 tablets by mouth Every Night.  Dispense: 135 tablet; Refill: 2          Follow Up     Return in about 6 months (around 10/13/2022) for with brian or [place].    Patient was given instructions and counseling regarding her condition or for health maintenance advice. Please see specific information pulled into the AVS if appropriate.     Daniel Anthony MD  04/13/22 13:50 EDT

## 2022-04-13 NOTE — ASSESSMENT & PLAN NOTE
Patient has a dual-chamber pacemaker with about 25 years of battery longevity left.  No episodes recorded.  Normal dual-chamber device function measurement and testing.  68% of the time she is paced in the atria 94% of the time in the right ventricle

## 2022-04-18 NOTE — TELEPHONE ENCOUNTER
The patient called back and I gave her results, she is willing to have the barium swallow. Order was placed.

## 2022-04-27 ENCOUNTER — HOSPITAL ENCOUNTER (OUTPATIENT)
Dept: GENERAL RADIOLOGY | Facility: HOSPITAL | Age: 87
Discharge: HOME | End: 2022-04-27
Admitting: NURSE PRACTITIONER

## 2022-04-27 ENCOUNTER — TELEPHONE (OUTPATIENT)
Dept: FAMILY MEDICINE CLINIC | Facility: CLINIC | Age: 87
End: 2022-04-27

## 2022-04-27 DIAGNOSIS — R13.10 DYSPHAGIA, UNSPECIFIED TYPE: ICD-10-CM

## 2022-04-27 PROCEDURE — 63710000001 BARIUM SULFATE 40 % RECONSTITUTED SUSPENSION: Performed by: NURSE PRACTITIONER

## 2022-04-27 PROCEDURE — A9270 NON-COVERED ITEM OR SERVICE: HCPCS | Performed by: NURSE PRACTITIONER

## 2022-04-27 PROCEDURE — 74230 X-RAY XM SWLNG FUNCJ C+: CPT

## 2022-04-27 PROCEDURE — 63710000001 BARIUM SULFATE 700 MG TABLET: Performed by: NURSE PRACTITIONER

## 2022-04-27 PROCEDURE — 63710000001 BARIUM SULFATE 60 % CREAM: Performed by: NURSE PRACTITIONER

## 2022-04-27 PROCEDURE — 63710000001 BARIUM SULFATE 40 % SUSPENSION: Performed by: NURSE PRACTITIONER

## 2022-04-27 PROCEDURE — 92611 MOTION FLUOROSCOPY/SWALLOW: CPT

## 2022-04-27 RX ADMIN — BARIUM SULFATE 50 ML: 400 SUSPENSION ORAL at 10:16

## 2022-04-27 RX ADMIN — BARIUM SULFATE 700 MG: 700 TABLET ORAL at 10:16

## 2022-04-27 RX ADMIN — BARIUM SULFATE 55 ML: 0.81 POWDER, FOR SUSPENSION ORAL at 10:16

## 2022-04-27 RX ADMIN — BARIUM SULFATE 1 TEASPOON(S): 0.6 CREAM ORAL at 10:16

## 2022-04-27 NOTE — MBS/VFSS/FEES
Outpatient - Speech Language Pathology   Swallow Modified Barium Swallow JUAN RAMON Willard     Patient Name: Fabiana Herbert  : 1934  MRN: [id number]  Today's Date: 2022               Admit Date: 2022    Visit Dx:     ICD-10-CM ICD-9-CM   1. Dysphagia, unspecified type  R13.10 787.20     Patient Active Problem List   Diagnosis   • Anemia   • Anxiety   • Arthritis   • Lumbar degenerative disc disease   • Lightheadedness   • Difficulty in swallowing   • Fatigue   • Weakness   • Hearing loss   • Hyperlipidemia   • Hypertension, essential   • Hypothyroidism   • Imbalance   • Incontinence in female   • Nocturia   • Insomnia   • Iron (Fe) deficiency anemia   • Difficult or painful urination   • Macular degeneration   • Mitral valve disorder   • Presence of cardiac pacemaker   • Peptic ulcer disease   • Ringing in ears   • Urethral caruncle   • Constipation   • History of diverticulitis   • Urinary frequency   • Hemorrhoids   • Chronic diastolic congestive heart failure (HCC)   • S/P MVR (mitral valve replacement)   • Stage 3b chronic kidney disease ([facility])   • Encounter for long-term (current) use of insulin ([facility])   • Lumbar radiculopathy   • Osteoarthritis of left hip   • Degeneration of lumbar intervertebral disc   • Gastroesophageal reflux disease without esophagitis   • CHB (complete heart block) ([facility])     Past Medical History:   Diagnosis Date   • Anemia    • Anxiety    • Arthritis     spine   • Back pain    • Cramps, muscle, general    • Degenerative lumbar disc 2014    LUMBAR /LS DISC DEGENERATION   • Difficulty in swallowing    • Dizziness    • Dysuria 2019   • EKG abnormality 2016   • Fatigue    • Headache    • Hearing loss    • Hyperlipidemia    • Hypertension, essential    • Hypothyroidism    • Imbalance    • Incontinence in female    • Ingrowing toenail    • Insomnia    • Iron deficiency anemia 2017   • Joint pain    • Leg pain    • Lightheadedness 2018    The patient  reports onset of lightheadedness following heart surgery in 2016. She notes that this tends to be brought on by a change in position. I would consider orthostasis as a potential culprit. I will attempt to pursue orthostatic blood pressures but the patient notes that it is hard for her to lay flaat on the exam table. I will request her records be sent for my review.    • Low back pain 06/09/2014   • Lumbar degenerative disc disease 02/01/2017   • Macular degeneration     Dharmesh   • Mitral valve disorder    • Mitral valve replaced 12/07/2017   • Nocturia 05/24/2019   • Pacemaker 02/23/2018   • Peptic ulcer disease 02/01/2017   • Ringing in ear    • Sciatica 01/09/2015   • Shortness of breath    • Urethral caruncle 05/24/2019   • Vision changes    • Weakness      Past Surgical History:   Procedure Laterality Date   • APPENDECTOMY  1951   • BREAST BIOPSY Left 1955   • CATARACT EXTRACTION WITH INTRAOCULAR LENS IMPLANT Bilateral     1992, 2001   • COLON SURGERY  2018    Dr. Alonso, Moderate Diverticulosis   • COLONOSCOPY     • CYSTOSCOPY  02/18/2020    excision of urethral prolapse w/ Dr. Ibanez   • CYSTOSCOPY  02/18/2020    Excision of Urethral Prolapse w/ Dr. Ibanez   • ENDOSCOPY  2018   • HEMORRHOIDECTOMY      2009/ 2014 hemorrhoid banding    • HYSTERECTOMY  1960    total    • LUMBAR LAMINECTOMY  07/2007   • MITRAL VALVE REPLACEMENT  11/17/2016    Dr. Singh in [place]   • PACEMAKER IMPLANTATION  11/2016    MRI compatible   • WRIST SURGERY Left      MODIFIED BARIUM SWALLOW STUDY: SPEECH PATHOLOGY REPORT    PERTINENT INFORMATION:  This patient is a 87year old female referred for modified barium swallow study to rule out aspiration..    Patient was referred for an MBSS by Dr. Asha Stevenson to rule out aspiration as well as to determine appropriate treatment plan for this patient.      PROCEDURE:    Patient was alert and cooperative.  The patient was viewed in lateral projection.  The following Ba consistencies  were administered: Thin liquids from spoon cup and straw, nectar thick liquids, purée consistencies soft and regular solids as well as barium tablet.  The following compensatory swallowing strategies were performed: Double swallow, liquid wash      RESULTS:    1.  Oral stage is characterized by good bolus preparation and control.  Timely oral transit with good A-P propulsion of bolus.  Pharyngeal phase is timely mildly reduced epiglottic inversion noted intermittently.  Mild cricopharyngeal prominence however bolus clears.  Swallow completed with thin liquids from spoon and cup without penetration or aspiration.  Swallow completed with thin liquids from straw with shallow transient penetration noted with sequential swallows.  Moderate vallecular residue after the swallow with solids this clears with liquid wash.  Barium tablet does clear briefly in the vallecular space.  Double swallow was not effective in clearing however patient given trial of applesauce which helped clear tablet from oral cavity.  A screening esophageal sweep was completed please refer to radiologist report for details.      IMPRESSIONS:    Patient demonstrated mild pharyngeal dysphagia characterized by pharyngeal residue noted after the swallow with solids.     FUNCTIONAL DEFICIT: Patient scored level 6 of 7 on Functional Communication Measures for swallowing indicating a 1-19% limitation in function for current status, goal status, and discharge status.      RECOMMENDATIONS:   1.  Regular diet-thin liquids  2.  Add sauce/gravy to dry foods  3.  Double swallow  4.  Alternate solids and liquids  5.  Oral meds in applesauce as needed.        YES: Patient/responsible party agrees with the plan of care and has been informed of all alternatives, risks and benefits.    Thank you for this referral.    EDUCATION  The patient has been educated in the following areas:   Dysphagia (Swallowing Impairment).            Porsha Chin, SLP  4/27/2022

## 2022-05-03 ENCOUNTER — CLINICAL SUPPORT (OUTPATIENT)
Dept: FAMILY MEDICINE CLINIC | Facility: CLINIC | Age: 87
End: 2022-05-03

## 2022-05-03 DIAGNOSIS — F41.9 ANXIETY: ICD-10-CM

## 2022-05-03 DIAGNOSIS — R11.0 NAUSEA: ICD-10-CM

## 2022-05-03 DIAGNOSIS — Z79.899 LONG TERM USE OF DRUG: Primary | ICD-10-CM

## 2022-05-03 LAB

## 2022-05-03 PROCEDURE — 80305 DRUG TEST PRSMV DIR OPT OBS: CPT | Performed by: NURSE PRACTITIONER

## 2022-05-03 NOTE — TELEPHONE ENCOUNTER
Incoming Refill Request      Medication requested (name and dose):   LORazepam (ATIVAN) 0.5 MG tablet    ondansetron (ZOFRAN) 4 MG tablet    Pharmacy where request should be sent:   [facility]    Additional details provided by patient:   UDS DONE 5/3/2022    Best call back number:  [phone]    Does the patient have less than a 3 day supply:  [x] Yes  [] No    Ivelisse Merrill Rep  05/03/22, 11:10 EDT

## 2022-05-03 NOTE — TELEPHONE ENCOUNTER
RF request fot Ativan 0.5 mg 1 PO daily PRN #90 0 RF    LRX 01/21/22  UDS updated today   OV 03/18/22  F/U 06/28/22

## 2022-05-04 ENCOUNTER — TELEPHONE (OUTPATIENT)
Dept: FAMILY MEDICINE CLINIC | Facility: CLINIC | Age: 87
End: 2022-05-04

## 2022-05-04 RX ORDER — LORAZEPAM 0.5 MG/1
0.5 TABLET ORAL DAILY PRN
Qty: 30 TABLET | Refills: 2 | Status: SHIPPED | OUTPATIENT
Start: 2022-05-04

## 2022-05-04 RX ORDER — ONDANSETRON 4 MG/1
4 TABLET, FILM COATED ORAL EVERY 8 HOURS PRN
Qty: 30 TABLET | Refills: 2 | Status: SHIPPED | OUTPATIENT
Start: 2022-05-04 | End: 2022-11-03

## 2022-05-04 NOTE — TELEPHONE ENCOUNTER
Caller: Fabiana Herbert    Relationship: Self    Best call back number: [phone]    What is the best time to reach you: ANYTIME    Who are you requesting to speak with (clinical staff, provider,  specific staff member): JUAN CARLOS      What was the call regarding: THE URINE SAMPLE SHE DID    Do you require a callback: YES

## 2022-05-04 NOTE — TELEPHONE ENCOUNTER
Khushbu spoke w/pt she was confused on the reason for the urine sample -she thought she was having a UA but had Uds-she made appt

## 2022-05-04 NOTE — TELEPHONE ENCOUNTER
----- Message from JACKLYN Cedillo sent at 5/3/2022  2:40 PM EDT -----  Can we send this urine out-says she is positive for buprenorphine and she is not on a drug in this class  
Urine sample tossed after pt left. Will recollect at next OV 06/28 ok per Kika   
No

## 2022-05-06 ENCOUNTER — OFFICE VISIT (OUTPATIENT)
Dept: FAMILY MEDICINE CLINIC | Facility: CLINIC | Age: 87
End: 2022-05-06

## 2022-05-06 VITALS
HEART RATE: 72 BPM | HEIGHT: 62 IN | SYSTOLIC BLOOD PRESSURE: 135 MMHG | WEIGHT: 100.4 LBS | DIASTOLIC BLOOD PRESSURE: 79 MMHG | BODY MASS INDEX: 18.48 KG/M2 | OXYGEN SATURATION: 96 %

## 2022-05-06 DIAGNOSIS — R31.9 HEMATURIA, UNSPECIFIED TYPE: ICD-10-CM

## 2022-05-06 DIAGNOSIS — R82.998 LEUKOCYTES IN URINE: ICD-10-CM

## 2022-05-06 DIAGNOSIS — Z79.899 LONG TERM USE OF DRUG: ICD-10-CM

## 2022-05-06 DIAGNOSIS — R35.0 FREQUENCY OF URINATION: ICD-10-CM

## 2022-05-06 DIAGNOSIS — N39.0 URINARY TRACT INFECTION WITHOUT HEMATURIA, SITE UNSPECIFIED: Primary | ICD-10-CM

## 2022-05-06 LAB
AMPHET+METHAMPHET UR QL: NEGATIVE
AMPHETAMINE INTERNAL CONTROL: ABNORMAL
AMPHETAMINES UR QL: NEGATIVE
BACTERIA UR QL AUTO: ABNORMAL /HPF
BARBITURATE INTERNAL CONTROL: ABNORMAL
BARBITURATES UR QL SCN: NEGATIVE
BENZODIAZ UR QL SCN: POSITIVE
BENZODIAZEPINE INTERNAL CONTROL: ABNORMAL
BILIRUB BLD-MCNC: NEGATIVE MG/DL
BILIRUB UR QL STRIP: NEGATIVE
BUPRENORPHINE INTERNAL CONTROL: ABNORMAL
BUPRENORPHINE SERPL-MCNC: NEGATIVE NG/ML
CANNABINOIDS SERPL QL: NEGATIVE
CLARITY UR: CLEAR
CLARITY, POC: CLEAR
COCAINE INTERNAL CONTROL: ABNORMAL
COCAINE UR QL: NEGATIVE
COLOR UR: YELLOW
COLOR UR: YELLOW
EXPIRATION DATE: ABNORMAL
EXPIRATION DATE: ABNORMAL
GLUCOSE UR STRIP-MCNC: NEGATIVE MG/DL
GLUCOSE UR STRIP-MCNC: NEGATIVE MG/DL
HGB UR QL STRIP.AUTO: NEGATIVE
HYALINE CASTS UR QL AUTO: ABNORMAL /LPF
KETONES UR QL STRIP: ABNORMAL
KETONES UR QL: NEGATIVE
LEUKOCYTE EST, POC: ABNORMAL
LEUKOCYTE ESTERASE UR QL STRIP.AUTO: ABNORMAL
Lab: ABNORMAL
Lab: ABNORMAL
MDMA (ECSTASY) INTERNAL CONTROL: ABNORMAL
MDMA UR QL SCN: NEGATIVE
METHADONE INTERNAL CONTROL: ABNORMAL
METHADONE UR QL SCN: NEGATIVE
METHAMPHETAMINE INTERNAL CONTROL: ABNORMAL
NITRITE UR QL STRIP: NEGATIVE
NITRITE UR-MCNC: NEGATIVE MG/ML
OPIATES INTERNAL CONTROL: ABNORMAL
OPIATES UR QL: NEGATIVE
OXYCODONE INTERNAL CONTROL: ABNORMAL
OXYCODONE UR QL SCN: NEGATIVE
PCP UR QL SCN: NEGATIVE
PH UR STRIP.AUTO: 5.5 [PH] (ref 5–8)
PH UR: 5.5 [PH] (ref 5–8)
PHENCYCLIDINE INTERNAL CONTROL: ABNORMAL
PROT UR QL STRIP: ABNORMAL
PROT UR STRIP-MCNC: ABNORMAL MG/DL
RBC # UR STRIP: ABNORMAL /HPF
RBC # UR STRIP: ABNORMAL /UL
REF LAB TEST METHOD: ABNORMAL
SP GR UR STRIP: >=1.03 (ref 1–1.03)
SP GR UR: 1.02 (ref 1–1.03)
SQUAMOUS #/AREA URNS HPF: ABNORMAL /HPF
THC INTERNAL CONTROL: ABNORMAL
UROBILINOGEN UR QL STRIP: ABNORMAL
UROBILINOGEN UR QL: NORMAL
WBC # UR STRIP: ABNORMAL /HPF
YEAST URNS QL MICRO: ABNORMAL /HPF

## 2022-05-06 PROCEDURE — 87086 URINE CULTURE/COLONY COUNT: CPT | Performed by: NURSE PRACTITIONER

## 2022-05-06 PROCEDURE — 99214 OFFICE O/P EST MOD 30 MIN: CPT | Performed by: NURSE PRACTITIONER

## 2022-05-06 PROCEDURE — 81001 URINALYSIS AUTO W/SCOPE: CPT | Performed by: NURSE PRACTITIONER

## 2022-05-06 PROCEDURE — 80305 DRUG TEST PRSMV DIR OPT OBS: CPT | Performed by: NURSE PRACTITIONER

## 2022-05-06 RX ORDER — HYDROCHLOROTHIAZIDE 12.5 MG/1
12.5 CAPSULE, GELATIN COATED ORAL DAILY
COMMUNITY
Start: 2022-04-18 | End: 2022-05-06 | Stop reason: ALTCHOICE

## 2022-05-06 NOTE — ASSESSMENT & PLAN NOTE
I am sending off urine specimen for additional testing, as trace leukocytes were detected in screening. I will hold off on prescribing any new medications, until we receive results, as the patient has multiple medication allergies.

## 2022-05-07 LAB — BACTERIA SPEC AEROBE CULT: NORMAL

## 2022-05-11 ENCOUNTER — LAB (OUTPATIENT)
Dept: FAMILY MEDICINE CLINIC | Facility: CLINIC | Age: 87
End: 2022-05-11

## 2022-05-11 DIAGNOSIS — R35.0 FREQUENCY OF URINATION: Primary | ICD-10-CM

## 2022-05-11 DIAGNOSIS — N39.0 URINARY TRACT INFECTION WITHOUT HEMATURIA, SITE UNSPECIFIED: ICD-10-CM

## 2022-05-11 LAB
BACTERIA UR QL AUTO: ABNORMAL /HPF
BACTERIA UR QL AUTO: ABNORMAL /HPF
BILIRUB UR QL STRIP: NEGATIVE
BILIRUB UR QL STRIP: NEGATIVE
CLARITY UR: CLEAR
CLARITY UR: CLEAR
COLOR UR: YELLOW
COLOR UR: YELLOW
GLUCOSE UR STRIP-MCNC: NEGATIVE MG/DL
GLUCOSE UR STRIP-MCNC: NEGATIVE MG/DL
HGB UR QL STRIP.AUTO: ABNORMAL
HGB UR QL STRIP.AUTO: ABNORMAL
HYALINE CASTS UR QL AUTO: ABNORMAL /LPF
HYALINE CASTS UR QL AUTO: ABNORMAL /LPF
KETONES UR QL STRIP: NEGATIVE
KETONES UR QL STRIP: NEGATIVE
LEUKOCYTE ESTERASE UR QL STRIP.AUTO: ABNORMAL
LEUKOCYTE ESTERASE UR QL STRIP.AUTO: ABNORMAL
NITRITE UR QL STRIP: NEGATIVE
NITRITE UR QL STRIP: NEGATIVE
PH UR STRIP.AUTO: 5.5 [PH] (ref 5–8)
PH UR STRIP.AUTO: 5.5 [PH] (ref 5–8)
PROT UR QL STRIP: ABNORMAL
PROT UR QL STRIP: ABNORMAL
RBC # UR STRIP: ABNORMAL /HPF
RBC # UR STRIP: ABNORMAL /HPF
REF LAB TEST METHOD: ABNORMAL
REF LAB TEST METHOD: ABNORMAL
SP GR UR STRIP: >=1.03 (ref 1–1.03)
SP GR UR STRIP: >=1.03 (ref 1–1.03)
SQUAMOUS #/AREA URNS HPF: ABNORMAL /HPF
SQUAMOUS #/AREA URNS HPF: ABNORMAL /HPF
UROBILINOGEN UR QL STRIP: ABNORMAL
UROBILINOGEN UR QL STRIP: ABNORMAL
WBC # UR STRIP: ABNORMAL /HPF
WBC # UR STRIP: ABNORMAL /HPF
YEAST URNS QL MICRO: ABNORMAL /HPF

## 2022-05-11 PROCEDURE — 81001 URINALYSIS AUTO W/SCOPE: CPT | Performed by: NURSE PRACTITIONER

## 2022-05-11 PROCEDURE — 87086 URINE CULTURE/COLONY COUNT: CPT | Performed by: NURSE PRACTITIONER

## 2022-05-12 LAB — BACTERIA SPEC AEROBE CULT: NORMAL

## 2022-05-13 ENCOUNTER — OFFICE VISIT (OUTPATIENT)
Dept: FAMILY MEDICINE CLINIC | Facility: CLINIC | Age: 87
End: 2022-05-13

## 2022-05-13 DIAGNOSIS — R35.0 FREQUENCY OF URINATION: Primary | ICD-10-CM

## 2022-05-13 DIAGNOSIS — N39.0 URINARY TRACT INFECTION WITHOUT HEMATURIA, SITE UNSPECIFIED: ICD-10-CM

## 2022-05-13 LAB
BACTERIA UR QL AUTO: NORMAL /HPF
BILIRUB UR QL STRIP: NEGATIVE
CLARITY UR: CLEAR
COLOR UR: YELLOW
GLUCOSE UR STRIP-MCNC: NEGATIVE MG/DL
HGB UR QL STRIP.AUTO: NEGATIVE
HYALINE CASTS UR QL AUTO: NORMAL /LPF
KETONES UR QL STRIP: NEGATIVE
LEUKOCYTE ESTERASE UR QL STRIP.AUTO: NEGATIVE
NITRITE UR QL STRIP: NEGATIVE
PH UR STRIP.AUTO: 5.5 [PH] (ref 5–8)
PROT UR QL STRIP: ABNORMAL
RBC # UR STRIP: NORMAL /HPF
REF LAB TEST METHOD: NORMAL
SP GR UR STRIP: >=1.03 (ref 1–1.03)
SQUAMOUS #/AREA URNS HPF: NORMAL /HPF
UROBILINOGEN UR QL STRIP: ABNORMAL
WBC # UR STRIP: NORMAL /HPF

## 2022-05-13 PROCEDURE — 99213 OFFICE O/P EST LOW 20 MIN: CPT | Performed by: NURSE PRACTITIONER

## 2022-05-13 PROCEDURE — 81001 URINALYSIS AUTO W/SCOPE: CPT | Performed by: NURSE PRACTITIONER

## 2022-05-13 PROCEDURE — 87086 URINE CULTURE/COLONY COUNT: CPT | Performed by: NURSE PRACTITIONER

## 2022-05-15 LAB — BACTERIA SPEC AEROBE CULT: NO GROWTH

## 2022-05-16 ENCOUNTER — TELEPHONE (OUTPATIENT)
Dept: FAMILY MEDICINE CLINIC | Facility: CLINIC | Age: 87
End: 2022-05-16

## 2022-05-16 NOTE — TELEPHONE ENCOUNTER
----- Message from JACKLYN Cedillo sent at 5/16/2022 12:00 PM EDT -----  Let her know there was no growth on the culture

## 2022-05-31 ENCOUNTER — TRANSCRIBE ORDERS (OUTPATIENT)
Dept: ADMINISTRATIVE | Facility: HOSPITAL | Age: 87
End: 2022-05-31

## 2022-05-31 DIAGNOSIS — R29.898 WEAKNESS OF RIGHT ARM: Primary | ICD-10-CM

## 2022-06-10 DIAGNOSIS — I10 HYPERTENSION, ESSENTIAL: ICD-10-CM

## 2022-06-10 RX ORDER — LOSARTAN POTASSIUM 25 MG/1
TABLET ORAL
Qty: 90 TABLET | Refills: 1 | Status: SHIPPED | OUTPATIENT
Start: 2022-06-10 | End: 2022-08-30 | Stop reason: SDUPTHER

## 2022-06-13 DIAGNOSIS — G89.29 CHRONIC LEFT-SIDED LOW BACK PAIN WITH LEFT-SIDED SCIATICA: ICD-10-CM

## 2022-06-13 DIAGNOSIS — I10 HYPERTENSION, ESSENTIAL: ICD-10-CM

## 2022-06-13 DIAGNOSIS — E03.9 HYPOTHYROIDISM, UNSPECIFIED TYPE: ICD-10-CM

## 2022-06-13 DIAGNOSIS — M54.42 CHRONIC LEFT-SIDED LOW BACK PAIN WITH LEFT-SIDED SCIATICA: ICD-10-CM

## 2022-06-13 RX ORDER — HYDROCHLOROTHIAZIDE 12.5 MG/1
CAPSULE, GELATIN COATED ORAL
Qty: 90 CAPSULE | Refills: 1 | OUTPATIENT
Start: 2022-06-13

## 2022-06-13 RX ORDER — LEVOTHYROXINE SODIUM 0.05 MG/1
TABLET ORAL
Qty: 90 TABLET | Refills: 1 | Status: SHIPPED | OUTPATIENT
Start: 2022-06-13 | End: 2023-01-30

## 2022-06-13 RX ORDER — LIDOCAINE 50 MG/G
PATCH TOPICAL
Qty: 60 PATCH | Refills: 0 | Status: SHIPPED | OUTPATIENT
Start: 2022-06-13 | End: 2022-06-15 | Stop reason: SDUPTHER

## 2022-06-13 NOTE — TELEPHONE ENCOUNTER
Last OV 5/13/22, f/u scheduled 6/28/22, UDS 5/6/22.  Patient should not need refill on Levothyroxine until 8/14/22.

## 2022-06-14 ENCOUNTER — HOSPITAL ENCOUNTER (OUTPATIENT)
Dept: MRI IMAGING | Facility: HOSPITAL | Age: 87
Discharge: HOME | End: 2022-06-14
Admitting: PHYSICIAN ASSISTANT

## 2022-06-14 VITALS — HEART RATE: 69 BPM | SYSTOLIC BLOOD PRESSURE: 137 MMHG | OXYGEN SATURATION: 93 % | DIASTOLIC BLOOD PRESSURE: 82 MMHG

## 2022-06-14 DIAGNOSIS — R29.898 WEAKNESS OF RIGHT ARM: ICD-10-CM

## 2022-06-14 PROCEDURE — 72141 MRI NECK SPINE W/O DYE: CPT

## 2022-06-15 DIAGNOSIS — G89.29 CHRONIC LEFT-SIDED LOW BACK PAIN WITH LEFT-SIDED SCIATICA: ICD-10-CM

## 2022-06-15 DIAGNOSIS — N95.2 ATROPHIC VAGINITIS: ICD-10-CM

## 2022-06-15 DIAGNOSIS — M54.42 CHRONIC LEFT-SIDED LOW BACK PAIN WITH LEFT-SIDED SCIATICA: ICD-10-CM

## 2022-06-15 RX ORDER — LIDOCAINE 50 MG/G
2 PATCH TOPICAL EVERY 24 HOURS
Qty: 60 PATCH | Refills: 0 | Status: SHIPPED | OUTPATIENT
Start: 2022-06-15 | End: 2022-10-11 | Stop reason: SDUPTHER

## 2022-06-15 RX ORDER — ESTRADIOL 0.1 MG/G
1 CREAM VAGINAL DAILY
Qty: 42.5 G | Refills: 3 | Status: SHIPPED | OUTPATIENT
Start: 2022-06-15 | End: 2022-09-27

## 2022-06-15 NOTE — TELEPHONE ENCOUNTER
Incoming Refill Request      Medication requested (name and dose): lidocaine (LIDODERM) 5 % APPLY TWO PATCHES EXTERNALLY TO THE SKIN EVERY DAY. MAY WEAR UP TO 12 HOURS    estradiol (ESTRACE) 0.1 MG/GM vaginal cream Insert 1 g into the vagina Daily    [facility]    Additional details provided by patient: 90 DAY SUPPLY    Best call back number: [phone]    Does the patient have less than a 3 day supply:  [x] Yes  [] No    Ivelisse Olivia Rep  06/15/22, 14:05 EDT

## 2022-06-28 ENCOUNTER — OFFICE VISIT (OUTPATIENT)
Dept: FAMILY MEDICINE CLINIC | Facility: CLINIC | Age: 87
End: 2022-06-28

## 2022-06-28 VITALS
BODY MASS INDEX: 18.77 KG/M2 | DIASTOLIC BLOOD PRESSURE: 99 MMHG | WEIGHT: 102 LBS | OXYGEN SATURATION: 97 % | HEIGHT: 62 IN | SYSTOLIC BLOOD PRESSURE: 157 MMHG | HEART RATE: 70 BPM

## 2022-06-28 DIAGNOSIS — E03.9 HYPOTHYROIDISM, UNSPECIFIED TYPE: ICD-10-CM

## 2022-06-28 DIAGNOSIS — R59.1 LYMPHADENOPATHY: ICD-10-CM

## 2022-06-28 DIAGNOSIS — I10 PRIMARY HYPERTENSION: ICD-10-CM

## 2022-06-28 DIAGNOSIS — K64.9 HEMORRHOIDS, UNSPECIFIED HEMORRHOID TYPE: ICD-10-CM

## 2022-06-28 DIAGNOSIS — N32.81 OVERACTIVE BLADDER: Primary | ICD-10-CM

## 2022-06-28 PROCEDURE — 99213 OFFICE O/P EST LOW 20 MIN: CPT | Performed by: NURSE PRACTITIONER

## 2022-06-28 PROCEDURE — 1160F RVW MEDS BY RX/DR IN RCRD: CPT | Performed by: NURSE PRACTITIONER

## 2022-06-28 PROCEDURE — 1125F AMNT PAIN NOTED PAIN PRSNT: CPT | Performed by: NURSE PRACTITIONER

## 2022-06-28 PROCEDURE — 96160 PT-FOCUSED HLTH RISK ASSMT: CPT | Performed by: NURSE PRACTITIONER

## 2022-06-28 PROCEDURE — 1170F FXNL STATUS ASSESSED: CPT | Performed by: NURSE PRACTITIONER

## 2022-06-28 PROCEDURE — G0439 PPPS, SUBSEQ VISIT: HCPCS | Performed by: NURSE PRACTITIONER

## 2022-06-28 NOTE — PROGRESS NOTES
"The ABCs of the Annual Wellness Visit  Subsequent Medicare Wellness Visit    No chief complaint on file.     Subjective    History of Present Illness:  Fabiana Herbert is a 87 y.o. female who presents for a Subsequent Medicare Wellness Visit.    The patient presents today for a 3 month follow up.     Osteoporosis- The patient's most recent DEXA scan was in 2019, it showed osteoporosis. The patient reports she does not want to do another DEXA scan. She notes she takes Tylenol, calcium and a vitamin. She denies any treatment for osteoporosis and states she does not want any.     The patient reports she was recently seen at Dr. Tate's at [facility], she is being referred to Dr. Romo neurosurgery, and she states she is \"not going to have any of this done either\". The patient reports she has poor vision and poor balance, however, she has not fallen down yet. If she takes a step down, she has to hold onto something. She states she can not do anything physically anymore, which causes her some depression because she is bored.     Cardiac- The patient reports her pacemaker is due to be replaced within 2 years. She is followed by Dr. Anthony.    Hemorrhoids- The patient complains of her hemorrhoids being bothersome. She is using suppositories and hydrocortisone cream. The patient states she has to use a suppository every other day to get relief.     Urinary frequency- The patient is not currently on any medication for her bladder. She complains of constant leakage.The patient is using estradiol cream for the vaginal dryness/burning which does give her some relief.      Back pain- The patient complains of constant back pain. Her most recent injection was about 2 weeks ago, and gave her some relief for about 24 hours. She states the other 2 injections she received gave her much more relief. She has had a total of 3 injections. The patient has a follow up in a couple of weeks.     Hypertension- The patient " attributes her elevated blood pressure to her back pain. She monitors her blood pressure at home, twice a day, morning and night. Her blood pressure log shows pressures between 120-130s with some intermittent 140s. The patient states over the last 10 days her blood pressure has been elevated. Her diastolic pressures range between 70-80s    Nausea- The patient complains of nausea, she states she has a poor appetite because of this. Her most recent scope was in 2020 which was a colonoscopy. She states the nausea is tolerable with the nausea medication. The patient notes she wakes up at 2:00 AM and eats if she feels hungry. She gets gas from bananas.     Vaccinations- The patient has received 2 COVID-19 vaccines and a booster. She has received the Zostavax. The patient states she received a tetanus vaccine in 10/2021.     Hypothyroid- The patient reports she monitors her diet. She complains of soreness and protruding on the left side. The patient had a normal ultrasound of her thyroid in 03/2022. She states it is uncomfortable but does not stop her from eating. The patient states she will not have another MRI. She uses Aspercream, it has been helpful. Her most recent swallow test in 04/2022, showed no significant trouble with penetration or aspiration. The barium swallow did get lodged momentarily before passing into the stomach.     Anxiety- The patient denies needing a refill of the lorazepam. UDS 5/3/22. [place] done today.    The following portions of the patient's history were reviewed and   updated as appropriate: allergies, current medications, past family history, past medical history, past social history, past surgical history and problem list.    Compared to one year ago, the patient feels her physical   health is worse.    Compared to one year ago, the patient feels her mental   health is the same.    Recent Hospitalizations:  She was not admitted to the hospital within the past 365 days       Current Medical  Providers:  Patient Care Team:  Kika Stevenson APRN as PCP - General (Nurse Practitioner)    Outpatient Medications Prior to Visit   Medication Sig Dispense Refill   • Bacillus Coagulans-Inulin (Probiotic) 1-250 BILLION-MG capsule Probiotic 15 billion cell oral capsule take 2 capsules by oral route daily   Active     • estradiol (ESTRACE) 0.1 MG/GM vaginal cream Insert 1 g into the vagina Daily. 42.5 g 3   • hydrocortisone 2.5 % cream APPLY TO AFFECTED AREA TWICE A DAY AS DIRECTED 28 g 3   • hydrOXYzine (ATARAX) 25 MG tablet Take 1 tab PO bid prn itching 60 tablet 2   • levothyroxine (SYNTHROID, LEVOTHROID) 50 MCG tablet TAKE 1 TABLET BY MOUTH EVERY DAY 90 tablet 1   • lidocaine (LIDODERM) 5 % Place 2 patches on the skin as directed by provider Daily. Remove & Discard patch within 12 hours or as directed by MD 60 patch 0   • LORazepam (ATIVAN) 0.5 MG tablet Take 1 tablet by mouth Daily As Needed for Anxiety. 30 tablet 2   • losartan (COZAAR) 25 MG tablet TAKE 1 TABLET BY MOUTH EVERY DAY 90 tablet 1   • mupirocin (BACTROBAN) 2 % ointment APPLY SMALL AMOUNT TOPICALLY TO THE AFFECTED AREA THREE TIMES DAILY 22 g 1   • nebivolol (BYSTOLIC) 10 MG tablet Take 1.5 tablets by mouth Every Night. 135 tablet 2   • ondansetron (ZOFRAN) 4 MG tablet Take 1 tablet by mouth Every 8 (Eight) Hours As Needed for Nausea or Vomiting. 30 tablet 2   • polyethylene glycol (MIRALAX) 17 GM/SCOOP powder 17 g.     • Probiotic Product (PROBIOTIC BLEND PO) Probiotic     • spironolactone (ALDACTONE) 25 MG tablet Take 1 tablet by mouth Every Other Day. 45 tablet 3     No facility-administered medications prior to visit.       No opioid medication identified on active medication list. I have reviewed chart for other potential  high risk medication/s and harmful drug interactions in the elderly.          Aspirin is not on active medication list.  Aspirin use is not indicated based on review of current medical condition/s. Risk of harm  "outweighs potential benefits.  .    Patient Active Problem List   Diagnosis   • Anemia   • Anxiety   • Arthritis   • Lumbar degenerative disc disease   • Lightheadedness   • Difficulty in swallowing   • Fatigue   • Weakness   • Hearing loss   • Hyperlipidemia   • Hypertension, essential   • Hypothyroidism   • Imbalance   • Incontinence in female   • Nocturia   • Insomnia   • Iron (Fe) deficiency anemia   • Difficult or painful urination   • Macular degeneration   • Mitral valve disorder   • Presence of cardiac pacemaker   • Peptic ulcer disease   • Ringing in ears   • Urethral caruncle   • Constipation   • History of diverticulitis   • Frequency of urination   • Hemorrhoids   • Chronic diastolic congestive heart failure ([facility])   • S/P MVR (mitral valve replacement)   • Stage 3b chronic kidney disease ([facility])   • Encounter for long-term (current) use of insulin ([facility])   • Lumbar radiculopathy   • Osteoarthritis of left hip   • Degeneration of lumbar intervertebral disc   • Gastroesophageal reflux disease without esophagitis   • CHB (complete heart block) ([facility])   • Urinary tract infection without hematuria     Advance Care Planning  Advance Directive is on file.  ACP discussion was held with the patient during this visit. Patient has an advance directive in EMR which is still valid.           Objective    Vitals:    06/28/22 1437   BP: 157/99   BP Location: Left arm   Patient Position: Sitting   Cuff Size: Pediatric   Pulse: 70   SpO2: 97%   Weight: 46.3 kg (102 lb)   Height: 157.5 cm (62\")   PainSc:   9   PainLoc: Back     Estimated body mass index is 18.66 kg/m² as calculated from the following:    Height as of this encounter: 157.5 cm (62\").    Weight as of this encounter: 46.3 kg (102 lb).          Does the patient have evidence of cognitive impairment? No    Physical Exam  Vitals reviewed.   Constitutional:       Appearance: Normal appearance. She is well-developed.   Neck:      Thyroid: No thyromegaly.      " Vascular: No carotid bruit.   Cardiovascular:      Rate and Rhythm: Normal rate and regular rhythm.      Heart sounds: No murmur heard.    No friction rub. No gallop.   Pulmonary:      Effort: Pulmonary effort is normal.      Breath sounds: Normal breath sounds. No wheezing or rhonchi.   Musculoskeletal:      Right lower leg: No edema.      Left lower leg: No edema.   Lymphadenopathy:      Cervical: Cervical adenopathy present.      Left cervical: Superficial cervical adenopathy present.   Neurological:      Mental Status: She is alert and oriented to person, place, and time.      Cranial Nerves: No cranial nerve deficit.   Psychiatric:         Mood and Affect: Mood and affect normal.         Behavior: Behavior normal.         Thought Content: Thought content normal.         Judgment: Judgment normal.                 HEALTH RISK ASSESSMENT    Smoking Status:  Social History     Tobacco Use   Smoking Status Never Smoker   Smokeless Tobacco Never Used     Alcohol Consumption:  Social History     Substance and Sexual Activity   Alcohol Use Never     Fall Risk Screen:    [facility] Fall Risk Assessment was completed, and patient is at LOW risk for falls.Assessment completed on:6/28/2022    Depression Screening:  PHQ-2/PHQ-9 Depression Screening 6/28/2022   Retired PHQ-9 Total Score -   Retired Total Score -   Little Interest or Pleasure in Doing Things 0-->not at all   Feeling Down, Depressed or Hopeless 0-->not at all   PHQ-9: Brief Depression Severity Measure Score 0       Health Habits and Functional and Cognitive Screening:  Functional & Cognitive Status 6/28/2022   Do you have difficulty preparing food and eating? Yes   Do you have difficulty bathing yourself, getting dressed or grooming yourself? No   Do you have difficulty using the toilet? No   Do you have difficulty moving around from place to place? No   Do you have trouble with steps or getting out of a bed or a chair? No   Current Diet Other        Current Diet  Comment pt watches what she eats no red meat   Dental Exam Up to date   Eye Exam Up to date   Exercise (times per week) 0 times per week   Current Exercises Include No Regular Exercise   Do you need help using the phone?  No   Are you deaf or do you have serious difficulty hearing?  Yes   Do you need help with transportation? Yes   Do you need help shopping? Yes   Do you need help preparing meals?  Yes   Do you need help with housework?  Yes   Do you need help with laundry? No   Do you need help taking your medications? No   Do you need help managing money? No   Do you ever drive or ride in a car without wearing a seat belt? No   Have you felt unusual stress, anger or loneliness in the last month? Yes   Who do you live with? Spouse   If you need help, do you have trouble finding someone available to you? No   Have you been bothered in the last four weeks by sexual problems? No   Do you have difficulty concentrating, remembering or making decisions? No       Age-appropriate Screening Schedule:  Refer to the list below for future screening recommendations based on patient's age, sex and/or medical conditions. Orders for these recommended tests are listed in the plan section. The patient has been provided with a written plan.    Health Maintenance   Topic Date Due   • TDAP/TD VACCINES (1 - Tdap) Never done   • ZOSTER VACCINE (2 of 3) 12/27/2012   • DXA SCAN  11/06/2021   • INFLUENZA VACCINE  10/01/2022   • LIPID PANEL  03/21/2023              Assessment & Plan   CMS Preventative Services Quick Reference  Risk Factors Identified During Encounter  Immunizations Discussed/Encouraged (specific Immunizations; COVID19 booster, Shingrex  The above risks/problems have been discussed with the patient.  Follow up actions/plans if indicated are seen below in the Assessment/Plan Section.  Pertinent information has been shared with the patient in the After Visit Summary.    Diagnoses and all orders for this visit:    1. Overactive  bladder (Primary)  Comments:   Urinary frequency  -Start Myrbetriq-samples given today-will follow up in 8 wks  Orders:  -     Mirabegron ER (Myrbetriq) 25 MG tablet sustained-release 24 hour 24 hr tablet; Take 1 tablet by mouth Daily. Indications: lot [id number] exp 5/23 2 boxes  Dispense: 90 tablet; Refill: 0    2. Lymphadenopathy  Comments:  - Order soft tissue ultrasound of neck   Orders:  -     US Soft Tissue; Future    3. Hemorrhoids, unspecified hemorrhoid type  Comments:  - Continue with hydrocortisone cream and suppositories    4. Hypothyroidism, unspecified type  Comments:  - Continue current regimen    5. Primary hypertension  Comments:  - Monitor            Follow Up:   Return in about 2 months (around 8/28/2022).     An After Visit Summary and PPPS were made available to the patient.          I spent 27 minutes caring for Fabiana on this date of service. This time includes time spent by me in the following activities:preparing for the visit, reviewing tests, obtaining and/or reviewing a separately obtained history, performing a medically appropriate examination and/or evaluation , counseling and educating the patient/family/caregiver, ordering medications, tests, or procedures, documenting information in the medical record and independently interpreting results and communicating that information with the patient/family/caregiver       Transcribed from ambient dictation for JACKLYN Cedillo by Yue Whiting.  06/28/22   16:18 EDT    Patient verbalized consent to the visit recording.

## 2022-07-25 ENCOUNTER — TELEPHONE (OUTPATIENT)
Dept: FAMILY MEDICINE CLINIC | Facility: CLINIC | Age: 87
End: 2022-07-25

## 2022-07-25 NOTE — TELEPHONE ENCOUNTER
PATIENT CAME INTO OFFICE STATING THAT THE VAGINAL CREAM IS NOT WORKING, POSSIBLY MAKING IT WORSE. PATIENT WANTS TO KNOW IF THERE IS SOMETHING ELSE AND OR IF THE BLADDER CONTROL MEDICATION COULD BE MAKING IT WORSE. PATIENT WOULD LIKE CALLED [phone]

## 2022-07-26 ENCOUNTER — HOSPITAL ENCOUNTER (OUTPATIENT)
Dept: ULTRASOUND IMAGING | Facility: HOSPITAL | Age: 87
Discharge: HOME | End: 2022-07-26
Admitting: NURSE PRACTITIONER

## 2022-07-26 DIAGNOSIS — R59.1 LYMPHADENOPATHY: ICD-10-CM

## 2022-07-26 PROCEDURE — 76999 ECHO EXAMINATION PROCEDURE: CPT

## 2022-07-27 ENCOUNTER — TELEPHONE (OUTPATIENT)
Dept: FAMILY MEDICINE CLINIC | Facility: CLINIC | Age: 87
End: 2022-07-27

## 2022-07-27 NOTE — TELEPHONE ENCOUNTER
Her vaginal symptoms had improved before starting her on Myrbetriq-have her hold to see if symptoms improve

## 2022-07-27 NOTE — TELEPHONE ENCOUNTER
----- Message from JACKLYN Cedillo sent at 7/27/2022  8:40 AM EDT -----  Let her know the lymph nodes in the area of concern are normal-no concern for neck mass or additional adenopathy. We did a CT of the area in 2020 that was normal as well

## 2022-08-11 ENCOUNTER — TELEPHONE (OUTPATIENT)
Dept: FAMILY MEDICINE CLINIC | Facility: CLINIC | Age: 87
End: 2022-08-11

## 2022-08-11 NOTE — TELEPHONE ENCOUNTER
Pt saw Kamilah Hutton at [facility] on 8/9/2022 . She reported that she was having TIA symptoms in her Right upper extremities and lower extremities that current at the same time and last couple minutes. Pt doesn't think it has nothing to cervical stenosis.

## 2022-08-11 NOTE — TELEPHONE ENCOUNTER
"Talked w/Dahlia and she stated  had concerns of TIA symptoms in -called pt and she is having numbness in her right hand and right foot at times. Denies slurred speech or facial drooping when this occurs-discussed neurology referral but she defers. Instructed pt to call 911 or have her  take her to ER if this occurs again-she states, \"I will cross that bridge when I get there\" discussed importance of early detection and treatment with stroke-she verbalized understanding. She is currently not having any symptoms.   "

## 2022-08-11 NOTE — TELEPHONE ENCOUNTER
[facility] CALLED AND WANTED TO INFORM ALY OF SOME THINGS GOING ON. PLEASE CALL WHEN YOU HAVE TIME OR SHE WILL TRY BACK @12-12:30 MARISEL SCHRADER -[phone]

## 2022-08-29 ENCOUNTER — CLINICAL SUPPORT NO REQUIREMENTS (OUTPATIENT)
Dept: CARDIOLOGY | Facility: CLINIC | Age: 87
End: 2022-08-29

## 2022-08-29 ENCOUNTER — OFFICE VISIT (OUTPATIENT)
Dept: CARDIOLOGY | Facility: CLINIC | Age: 87
End: 2022-08-29

## 2022-08-29 VITALS
HEIGHT: 62 IN | BODY MASS INDEX: 18.95 KG/M2 | WEIGHT: 103 LBS | SYSTOLIC BLOOD PRESSURE: 122 MMHG | DIASTOLIC BLOOD PRESSURE: 78 MMHG | HEART RATE: 72 BPM

## 2022-08-29 DIAGNOSIS — Z95.0 PRESENCE OF CARDIAC PACEMAKER: ICD-10-CM

## 2022-08-29 DIAGNOSIS — I49.5 SSS (SICK SINUS SYNDROME): ICD-10-CM

## 2022-08-29 DIAGNOSIS — Z95.0 PRESENCE OF CARDIAC PACEMAKER: Primary | ICD-10-CM

## 2022-08-29 DIAGNOSIS — R06.09 DOE (DYSPNEA ON EXERTION): Primary | ICD-10-CM

## 2022-08-29 PROBLEM — R42 LIGHTHEADEDNESS: Status: RESOLVED | Noted: 2018-03-14 | Resolved: 2022-08-29

## 2022-08-29 PROBLEM — I05.9 MITRAL VALVE DISORDER: Status: RESOLVED | Noted: 2021-06-18 | Resolved: 2022-08-29

## 2022-08-29 PROBLEM — R53.83 FATIGUE: Status: RESOLVED | Noted: 2021-06-18 | Resolved: 2022-08-29

## 2022-08-29 PROBLEM — R30.0 DIFFICULT OR PAINFUL URINATION: Status: RESOLVED | Noted: 2019-05-24 | Resolved: 2022-08-29

## 2022-08-29 PROBLEM — I44.2 CHB (COMPLETE HEART BLOCK): Status: RESOLVED | Noted: 2022-04-13 | Resolved: 2022-08-29

## 2022-08-29 PROBLEM — R53.1 WEAKNESS: Status: RESOLVED | Noted: 2021-06-18 | Resolved: 2022-08-29

## 2022-08-29 PROCEDURE — 99213 OFFICE O/P EST LOW 20 MIN: CPT | Performed by: NURSE PRACTITIONER

## 2022-08-29 PROCEDURE — 93280 PM DEVICE PROGR EVAL DUAL: CPT | Performed by: INTERNAL MEDICINE

## 2022-08-29 RX ORDER — BNT162B2 0.23 MG/2.25ML
INJECTION, SUSPENSION INTRAMUSCULAR
COMMUNITY
Start: 2022-08-09 | End: 2022-11-03

## 2022-08-29 RX ORDER — SPIRONOLACTONE 25 MG/1
25 TABLET ORAL EVERY OTHER DAY
Qty: 45 TABLET | Refills: 3 | Status: SHIPPED | OUTPATIENT
Start: 2022-08-29

## 2022-08-30 ENCOUNTER — OFFICE VISIT (OUTPATIENT)
Dept: FAMILY MEDICINE CLINIC | Facility: CLINIC | Age: 87
End: 2022-08-30

## 2022-08-30 VITALS
DIASTOLIC BLOOD PRESSURE: 77 MMHG | WEIGHT: 104.8 LBS | HEIGHT: 62 IN | OXYGEN SATURATION: 97 % | BODY MASS INDEX: 19.29 KG/M2 | SYSTOLIC BLOOD PRESSURE: 112 MMHG | HEART RATE: 53 BPM

## 2022-08-30 DIAGNOSIS — H00.015 HORDEOLUM EXTERNUM OF LEFT LOWER EYELID: ICD-10-CM

## 2022-08-30 DIAGNOSIS — I10 HYPERTENSION, ESSENTIAL: ICD-10-CM

## 2022-08-30 DIAGNOSIS — L29.9 PRURITUS: ICD-10-CM

## 2022-08-30 DIAGNOSIS — N32.81 OAB (OVERACTIVE BLADDER): ICD-10-CM

## 2022-08-30 DIAGNOSIS — N36.2 URETHRAL CARUNCLE: Primary | ICD-10-CM

## 2022-08-30 DIAGNOSIS — N94.9 VAGINAL BURNING: ICD-10-CM

## 2022-08-30 PROCEDURE — 99214 OFFICE O/P EST MOD 30 MIN: CPT | Performed by: NURSE PRACTITIONER

## 2022-08-30 RX ORDER — LOSARTAN POTASSIUM 25 MG/1
25 TABLET ORAL DAILY
Qty: 90 TABLET | Refills: 1 | Status: SHIPPED | OUTPATIENT
Start: 2022-08-30

## 2022-08-30 RX ORDER — ERYTHROMYCIN 5 MG/G
OINTMENT OPHTHALMIC NIGHTLY
Qty: 3.5 G | Refills: 0 | Status: SHIPPED | OUTPATIENT
Start: 2022-08-30 | End: 2022-10-17

## 2022-08-30 RX ORDER — HYDROXYZINE HYDROCHLORIDE 25 MG/1
TABLET, FILM COATED ORAL
Qty: 60 TABLET | Refills: 2 | Status: SHIPPED | OUTPATIENT
Start: 2022-08-30 | End: 2022-11-03

## 2022-08-30 NOTE — PROGRESS NOTES
"Chief Complaint  Hypertension (Pt brought in BP log  ), Edgar on forehead (Pt states that she has had this spot on her forehead since June and has been using cream mupirocin cream hasn't helped at all.), and Overactive bladder (Pt feels like the Myrbetriq not working hasn't helped at all)    Pt stated that she is having issues with urinary burning and frequency, this has been going on since June.  She has been using creams and nothing seems to be helping.    She also has been using mupirocin on a place on her forehead and she states it hasn't worked.  She said that her left eye started burning last night and her upper eyelid feels red and is burning as well.    SUBJECTIVE  Fabiana Herbert presents to [facility]    History of Present Illness     She has consented to [facility].    Health concerns  The patient reports she is experiencing right inguinal hernia when bending over. The patient states she feels like she has a hernia when getting out of a chair. She states it does not always stay out when sneezing or coughing. The patient reports feeling as if the \"bone has popped out.\"    Vaginal burning  She continues to have vaginal burning throughout the day and night.  She has been using Estrace cream, hydrocortisone cream, and Vaseline with temporary relief. The patient has not followed up with urology in over 1 year. Her urologist is Dr. Ibanez. She notes no changes with the burning sensation. The patient is unsure how long she has been using the medication, but states it has been a long time.     Overactive bladder  The patient explains that when her kidneys move, she has a burning sensation in her vagina-this has been a chronic problem for her due to hx of urethral caruncle. She notes no improvement with the Myrbetriq in the urinary freq. The patient took all the Myrbetriq.    Cardiology  She was seen by cardiology on 08/29/2022. She was told everything was alright, but she does not agree. " "The patient reports shortness of breath when ambulating, adding that she becomes scared and has heart palpitations. She explains that she will sit down and take a couple Tylenol and the discomfort will diminish. She states the cardiologist used a device to check her pacemaker at her visit on 08/29/2022, and everything is fine. The patient was told her pacemaker will be good until she is 89 years old. She is scheduled to follow up with cardiology in 1 month for an echocardiogram.     Medication  The patient notes that she needs losartan and hydroxyzine refilled. She reports concerns about getting refills at the pharmacy.     Skin lesion   She has a spot on her forehead that feels \"rough.\" The patient denies any pain. She washes her face in the morning with Dove soap. The patient does not wear make-up.     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       Past Medical History:   Diagnosis Date   • Anemia    • Anxiety    • Arthritis     spine   • Back pain    • Cramps, muscle, general    • Degenerative lumbar disc 05/04/2014    LUMBAR /LS DISC DEGENERATION   • Difficulty in swallowing    • Dizziness    • Dysuria 05/24/2019   • EKG abnormality 08/12/2016   • Fatigue    • Headache    • Hearing loss    • Hyperlipidemia    • Hypertension, essential    • Hypothyroidism    • Imbalance    • Incontinence in female    • Ingrowing toenail    • Insomnia    • Iron deficiency anemia 02/01/2017   • Joint pain    • Leg pain    • Lightheadedness 03/14/2018    The patient reports onset of lightheadedness following heart surgery in 2016. She notes that this tends to be brought on by a change in position. I would consider orthostasis as a potential culprit. I will attempt to pursue orthostatic blood pressures but the patient notes that it is hard for her to lay flaat on the exam table. I will request her records be sent for my review.    • Low back pain 06/09/2014   • Lumbar degenerative disc disease 02/01/2017 "   • Macular degeneration     Dharmesh   • Mitral valve disorder    • Mitral valve replaced 12/07/2017   • Nocturia 05/24/2019   • Pacemaker 02/23/2018   • Peptic ulcer disease 02/01/2017   • Ringing in ear    • Sciatica 01/09/2015   • Shortness of breath    • Urethral caruncle 05/24/2019   • Vision changes    • Weakness       Family History   Problem Relation Age of Onset   • Heart disease Father    • Diabetes Father    • Heart disease Sister    • Diabetes Sister    • Heart disease Other    • Heart disease Other    • Colon cancer Neg Hx       Past Surgical History:   Procedure Laterality Date   • APPENDECTOMY  1951   • BREAST BIOPSY Left 1955   • CATARACT EXTRACTION WITH INTRAOCULAR LENS IMPLANT Bilateral     1992, 2001   • COLON SURGERY  2018    Dr. Alonso, Moderate Diverticulosis   • COLONOSCOPY     • CYSTOSCOPY  02/18/2020    excision of urethral prolapse w/ Dr. Ibanez   • CYSTOSCOPY  02/18/2020    Excision of Urethral Prolapse w/ Dr. Ibanez   • ENDOSCOPY  2018   • HEMORRHOIDECTOMY      2009/ 2014 hemorrhoid banding    • HYSTERECTOMY  1960    total    • LUMBAR LAMINECTOMY  07/2007   • MITRAL VALVE REPLACEMENT  11/17/2016    Dr. Singh in [place]   • PACEMAKER IMPLANTATION  11/2016    MRI compatible   • WRIST SURGERY Left         Current Outpatient Medications:   •  hydrOXYzine (ATARAX) 25 MG tablet, Take 1 tab PO bid prn itching, Disp: 60 tablet, Rfl: 2  •  losartan (COZAAR) 25 MG tablet, Take 1 tablet by mouth Daily., Disp: 90 tablet, Rfl: 1  •  Bacillus Coagulans-Inulin (Probiotic) 1-250 BILLION-MG capsule, Probiotic 15 billion cell oral capsule take 2 capsules by oral route daily   Active, Disp: , Rfl:   •  erythromycin (ROMYCIN) 5 MG/GM ophthalmic ointment, Administer  into the left eye Every Night., Disp: 3.5 g, Rfl: 0  •  estradiol (ESTRACE) 0.1 MG/GM vaginal cream, Insert 1 g into the vagina Daily., Disp: 42.5 g, Rfl: 3  •  hydrocortisone 2.5 % cream, APPLY TO AFFECTED AREA TWICE A DAY AS  "DIRECTED, Disp: 28 g, Rfl: 3  •  levothyroxine (SYNTHROID, LEVOTHROID) 50 MCG tablet, TAKE 1 TABLET BY MOUTH EVERY DAY, Disp: 90 tablet, Rfl: 1  •  lidocaine (LIDODERM) 5 %, Place 2 patches on the skin as directed by provider Daily. Remove & Discard patch within 12 hours or as directed by MD, Disp: 60 patch, Rfl: 0  •  LORazepam (ATIVAN) 0.5 MG tablet, Take 1 tablet by mouth Daily As Needed for Anxiety., Disp: 30 tablet, Rfl: 2  •  Mirabegron ER (Myrbetriq) 25 MG tablet sustained-release 24 hour 24 hr tablet, Take 1 tablet by mouth Daily. Indications: lot [id number] exp 5/23 2 boxes, Disp: 90 tablet, Rfl: 0  •  mupirocin (BACTROBAN) 2 % ointment, APPLY SMALL AMOUNT TOPICALLY TO THE AFFECTED AREA THREE TIMES DAILY, Disp: 22 g, Rfl: 1  •  nebivolol (BYSTOLIC) 10 MG tablet, Take 1.5 tablets by mouth Every Night., Disp: 135 tablet, Rfl: 2  •  ondansetron (ZOFRAN) 4 MG tablet, Take 1 tablet by mouth Every 8 (Eight) Hours As Needed for Nausea or Vomiting., Disp: 30 tablet, Rfl: 2  •  Pfizer-BioNT COVID-19 Vac-Maryan 30 MCG/0.3ML suspension, , Disp: , Rfl:   •  polyethylene glycol (MIRALAX) 17 GM/SCOOP powder, 17 g., Disp: , Rfl:   •  Probiotic Product (PROBIOTIC BLEND PO), Probiotic, Disp: , Rfl:   •  spironolactone (ALDACTONE) 25 MG tablet, Take 1 tablet by mouth Every Other Day., Disp: 45 tablet, Rfl: 3    OBJECTIVE  Vital Signs:   /77 (BP Location: Left arm, Patient Position: Sitting, Cuff Size: Pediatric)   Pulse 53   Ht 157.5 cm (62\")   Wt 47.5 kg (104 lb 12.8 oz)   LMP  (LMP Unknown)   SpO2 97%   Breastfeeding No   BMI 19.17 kg/m²    Estimated body mass index is 19.17 kg/m² as calculated from the following:    Height as of this encounter: 157.5 cm (62\").    Weight as of this encounter: 47.5 kg (104 lb 12.8 oz).     Wt Readings from Last 3 Encounters:   08/30/22 47.5 kg (104 lb 12.8 oz)   08/29/22 46.7 kg (103 lb)   06/28/22 46.3 kg (102 lb)     BP Readings from Last 3 Encounters:   08/30/22 112/77 "   08/29/22 122/78   06/28/22 157/99       Physical Exam  Constitutional:       Appearance: Normal appearance.   Neck:      Thyroid: No thyroid mass, thyromegaly or thyroid tenderness.      Vascular: No carotid bruit.   Cardiovascular:      Rate and Rhythm: Normal rate and regular rhythm.      Pulses: Normal pulses.      Heart sounds: Normal heart sounds.   Pulmonary:      Effort: Pulmonary effort is normal.      Breath sounds: Normal breath sounds.   Musculoskeletal:      Right lower leg: No edema.      Left lower leg: No edema.   Skin:     General: Skin is warm and dry.   Neurological:      General: No focal deficit present.      Mental Status: She is alert and oriented to person, place, and time.   Psychiatric:         Mood and Affect: Mood normal.         Behavior: Behavior normal.          Result Review    CMP    CMP 1/24/22 2/1/22 3/21/22   Glucose 97  85   BUN 28 (A)  33 (A)   Creatinine 1.60 (A) 1.40 1.23 (A)   eGFR Non African Am 30 (A)     Sodium 140  142   Potassium 4.1  4.2   Chloride 101  105   Calcium 10.4  9.9   Albumin 4.60  4.30   Total Bilirubin 0.4  0.5   Alkaline Phosphatase 89  50   AST (SGOT) 24  16   ALT (SGPT) 13  11   (A) Abnormal value       Comments are available for some flowsheets but are not being displayed.           CBC    CBC 1/24/22 2/15/22 3/21/22   WBC 8.08 7.37 6.21   RBC 4.12 3.96 3.70 (A)   Hemoglobin 14.6 14.0 13.3   Hematocrit 44.0 41.6 39.1   .8 (A) 105.1 (A) 105.7 (A)   MCH 35.4 (A) 35.4 (A) 35.9 (A)   MCHC 33.2 33.7 34.0   RDW 12.2 (A) 12.5 12.2 (A)   Platelets 231 244 208   (A) Abnormal value            CBC w/diff    CBC w/Diff 1/24/22 2/15/22 3/21/22   WBC 8.08 7.37 6.21   RBC 4.12 3.96 3.70 (A)   Hemoglobin 14.6 14.0 13.3   Hematocrit 44.0 41.6 39.1   .8 (A) 105.1 (A) 105.7 (A)   MCH 35.4 (A) 35.4 (A) 35.9 (A)   MCHC 33.2 33.7 34.0   RDW 12.2 (A) 12.5 12.2 (A)   Platelets 231 244 208   Neutrophil Rel %  73.6    Lymphocyte Rel %  13.4 (A)    Monocyte Rel %   9.4    Eosinophil Rel %  2.4    Basophil Rel %  0.8    (A) Abnormal value            Lipid Panel    Lipid Panel 10/5/21 3/21/22   Total Cholesterol 141 161   Triglycerides 170 (A) 185 (A)   HDL Cholesterol 49 50   VLDL Cholesterol 29 31   LDL Cholesterol  63 80   LDL/HDL Ratio 1.18 1.48   (A) Abnormal value            TSH    TSH 3/21/22   TSH 1.780             MRI Cervical Spine Without Contrast    Result Date: 6/14/2022    1. Grade 1 retrolisthesis of C3 on C4 and degenerative change at this level cause moderate to severe spinal canal narrowing and bilateral neural foraminal narrowing. 2. Additional mild to moderate spinal canal stenosis and multilevel neural foraminal narrowing, which is most severe at C5-C6, as detailed above.     DAVID ANDREW MD       Electronically Signed and Approved By: DAVID ANDREW MD on 6/14/2022 at 16:51             US Soft Tissue    Result Date: 7/26/2022   Normal lymph nodes in the area of concern in the left neck.  If there is concern for neck mass or additional adenopathy, consider CT neck with IV contrast.      MANDIE EMANUEL MD       Electronically Signed and Approved By: MANDIE EMANUEL MD on 7/26/2022 at 13:48                 The above data has been reviewed by JACKLYN Cedillo 08/30/2022 13:24 EDT.          Patient Care Team:  Kika Stevenson APRN as PCP - General (Nurse Practitioner)    BMI is within normal parameters. No other follow-up for BMI required.       ASSESSMENT & PLAN      Diagnoses and all orders for this visit:    1. Urethral caruncle (Primary)  -     Ambulatory Referral to Urology    2. Pruritus  -     hydrOXYzine (ATARAX) 25 MG tablet; Take 1 tab PO bid prn itching  Dispense: 60 tablet; Refill: 2    3. Hypertension, essential  -     losartan (COZAAR) 25 MG tablet; Take 1 tablet by mouth Daily.  Dispense: 90 tablet; Refill: 1    4. Hordeolum externum of left lower eyelid  Comments:  gave pt script of erythromycin ointment to apply q  hs.  Orders:  -     erythromycin (ROMYCIN) 5 MG/GM ophthalmic ointment; Administer  into the left eye Every Night.  Dispense: 3.5 g; Refill: 0    5. OAB (overactive bladder)  Comments:  - We will increase her Myrbetriq to 50mg-samples given in the office today.    6. Vaginal burning  Comments:  she has tried estrace, vasoline, hydrocortisone for the vaginal burning w/o relief-- We will refer her back to Dr. Ibanez.         Tobacco Use: Low Risk    • Smoking Tobacco Use: Never Smoker   • Smokeless Tobacco Use: Never Used       Follow Up     Return in about 2 months (around 10/30/2022).      Patient was given instructions and counseling regarding her condition or for health maintenance advice. Please see specific information pulled into the AVS if appropriate.   I have reviewed information obtained and documented by others and I have confirmed the accuracy of this documented note.    JACKLYN Cedillo      Transcribed from ambient dictation for JACKLYN Cedillo by Katie Blankenship.  08/30/22   17:26 EDT    Patient verbalized consent to the visit recording.  I have personally performed the services described in this document as transcribed by the above individual, and it is both accurate and complete.  Katie Blankenship  8/31/2022  17:26 EDT

## 2022-09-19 ENCOUNTER — TELEPHONE (OUTPATIENT)
Dept: UROLOGY | Facility: CLINIC | Age: 87
End: 2022-09-19

## 2022-09-19 NOTE — TELEPHONE ENCOUNTER
"Provider: DR ZUNIGA  Caller: WINTER CALDERÓN  Relationship to Patient: SELF  Pharmacy: [facility]/pharmacy #[id number] - [place], [place] - [address] - [phone] [facility] [phone] FX    Phone Number: [phone]  Reason for Call: PT HAS UPCOMING APPT ON 12-5-22 BUT WAS CALLING TO SEE IF SHE COULD BE SEEN SOONER, DR ZUNIGA'S NEXT AVAILABLE IS January 2023.     PT IS HAVING \"TERRIBLE LEAKING\" WITH BURNING SENSATION. PT'S PCP ALY HOLGUIN GAVE PT MYRBETRIQ AND UP'D THE DOSAGE BUT IT MAKES THE PT DIZZY AND SHE CAN'T TAKE IT. PT IS HAVING BURNING SENSATIONS, PT ALSO NEEDS TO URINATE EVERY 10-15 MINUTES, SHE'S UP ALL NIGHT. THE VAGINAL OINTMENT THE PCP PRESCRIBED HASN'T HELPED EITHER. PT STATES SHE \"JUST NEEDS SOME ATTENTION\"  When was the patient last seen: 1-22-21  When did it start: 2-3 MONTHS AND GETTING WORSE ALL THE TIME    What therapies/medications have you tried: MYRBETRIQ    "

## 2022-09-19 NOTE — TELEPHONE ENCOUNTER
Spoke with patient informing her that we are unable to  her any sooner unless it is an emergency. I instructed patient to contact her pcp for assistance.

## 2022-09-20 ENCOUNTER — OFFICE VISIT (OUTPATIENT)
Dept: FAMILY MEDICINE CLINIC | Facility: CLINIC | Age: 87
End: 2022-09-20

## 2022-09-20 VITALS
SYSTOLIC BLOOD PRESSURE: 139 MMHG | BODY MASS INDEX: 18.58 KG/M2 | HEART RATE: 78 BPM | HEIGHT: 62 IN | OXYGEN SATURATION: 98 % | WEIGHT: 101 LBS | DIASTOLIC BLOOD PRESSURE: 81 MMHG

## 2022-09-20 DIAGNOSIS — R32 URINARY INCONTINENCE, UNSPECIFIED TYPE: Primary | ICD-10-CM

## 2022-09-20 DIAGNOSIS — R25.1 TREMORS OF NERVOUS SYSTEM: ICD-10-CM

## 2022-09-20 DIAGNOSIS — R31.9 HEMATURIA, UNSPECIFIED TYPE: ICD-10-CM

## 2022-09-20 DIAGNOSIS — M54.2 CERVICAL PAIN (NECK): ICD-10-CM

## 2022-09-20 LAB
BILIRUB UR QL STRIP: NEGATIVE
CLARITY UR: ABNORMAL
COLOR UR: YELLOW
GLUCOSE UR STRIP-MCNC: NEGATIVE MG/DL
HGB UR QL STRIP.AUTO: ABNORMAL
KETONES UR QL STRIP: NEGATIVE
LEUKOCYTE ESTERASE UR QL STRIP.AUTO: NEGATIVE
NITRITE UR QL STRIP: NEGATIVE
PH UR STRIP.AUTO: 5.5 [PH] (ref 5–8)
PROT UR QL STRIP: ABNORMAL
SP GR UR STRIP: >=1.03 (ref 1–1.03)
UROBILINOGEN UR QL STRIP: ABNORMAL

## 2022-09-20 PROCEDURE — 81001 URINALYSIS AUTO W/SCOPE: CPT | Performed by: NURSE PRACTITIONER

## 2022-09-20 PROCEDURE — 81003 URINALYSIS AUTO W/O SCOPE: CPT | Performed by: NURSE PRACTITIONER

## 2022-09-20 PROCEDURE — 99213 OFFICE O/P EST LOW 20 MIN: CPT | Performed by: NURSE PRACTITIONER

## 2022-09-20 NOTE — TELEPHONE ENCOUNTER
Caller: Fabiana Herbert    Relationship to patient: Self    Best call back number: [phone]    Chief complaint: BURNING IN PELVIC REGION, URINE LEAKAGE    Type of visit: OFFICE VISIT    Requested date: TODAY OR TOMORROW     Additional notes: PATIENT WOULD LIKE TO KNOW IF SHE CAN BE FIT IN TODAY OR TOMORROW TO FOLLOW UP ON HER SYMPTOMS. SHE STILL HAS THE SAME SYMPTOMS AND THE MEDICATIONS THAT HER PROVIDER PRESCRIBED ARE NOT HELPING. HER PROVIDER DOES NOT HAVE ANY AVAILABILITY UNTIL November. PLEASE CALL PATIENT TO ADVISE.

## 2022-09-20 NOTE — PROGRESS NOTES
Chief Complaint  Burning in pevlic area  (Pt states that she has burning and frequent urination throughout the night.)    SUBJECTIVE  Fabiana Herbert presents to [facility]    History of Present Illness     Fabiana Herbert is an 86-year-old female who presents today for dysuria and urinary frequency. She is accompanied by an adult male.    Dysuria and urinary frequency - She complains of dysuria and urinary frequency. She has been using baby wipes instead of regular toilet paper. She has also been using erythromycin vaginal cream every time she urinates, sometimes it works and sometimes it does not. She also uses Vaseline, which takes away the burning sensation. She was using hydrocortisone cream at her last visit. She denies any known history of kidney stones.    Right hand and leg tremor - She complains of right hand and leg tremor that has been ongoing for several months. She describes the tremor as sudden onset and then it goes down into her right foot and she can not walk. The tremor is alleviated with rest. She denies any slurred speech. She denies any loss of thought processes. She has been using a cold pack and a heating pad with some relief. She also complains of a burning sensation in her bilateral ankles, right worse than left. She has been using a pain patch, Aspercreme, and an old sock to help ease the burning sensation. She has a history of arthritis in her neck. She had an MRI of her lumbar spine in 01/2022. She also has a history of open heart surgery in 2016 or 2017. She also has a history of macular degeneration. She reports that he is able to wear compression hose. She states that she will wear them for 1 to 2 weeks and then take them off and put them back in the closet.    Possible TIA  He reports that he is having a tremor that lasts approximately 5 minutes. He states that he can recall everything during the episode.    Gait disturbance - She complains of balance  issues. She has to hold on to something when she gets up to walk. She recalls an episode 3 weeks ago where she lost her balance and had to sit down on the floor. During that time, she had weakness all over her body.    Hypertension - She brings in her blood pressure log today. Most of her blood pressure readings are within normal limits. She had some elevated blood pressure readings in the 140s systolic. She states she forgot to take her medication that morning.    Past Medical History:   Diagnosis Date   • Anemia    • Anxiety    • Arthritis     spine   • Back pain    • Cramps, muscle, general    • Degenerative lumbar disc 05/04/2014    LUMBAR /LS DISC DEGENERATION   • Difficulty in swallowing    • Dizziness    • Dysuria 05/24/2019   • EKG abnormality 08/12/2016   • Fatigue    • Headache    • Hearing loss    • Hyperlipidemia    • Hypertension, essential    • Hypothyroidism    • Imbalance    • Incontinence in female    • Ingrowing toenail    • Insomnia    • Iron deficiency anemia 02/01/2017   • Joint pain    • Leg pain    • Lightheadedness 03/14/2018    The patient reports onset of lightheadedness following heart surgery in 2016. She notes that this tends to be brought on by a change in position. I would consider orthostasis as a potential culprit. I will attempt to pursue orthostatic blood pressures but the patient notes that it is hard for her to lay flaat on the exam table. I will request her records be sent for my review.    • Low back pain 06/09/2014   • Lumbar degenerative disc disease 02/01/2017   • Macular degeneration     Dharmesh   • Mitral valve disorder    • Mitral valve replaced 12/07/2017   • Nocturia 05/24/2019   • Pacemaker 02/23/2018   • Peptic ulcer disease 02/01/2017   • Ringing in ear    • Sciatica 01/09/2015   • Shortness of breath    • Urethral caruncle 05/24/2019   • Vision changes    • Weakness       Family History   Problem Relation Age of Onset   • Heart disease Father    • Diabetes  Father    • Heart disease Sister    • Diabetes Sister    • Heart disease Other    • Heart disease Other    • Colon cancer Neg Hx       Past Surgical History:   Procedure Laterality Date   • APPENDECTOMY  1951   • BREAST BIOPSY Left 1955   • CATARACT EXTRACTION WITH INTRAOCULAR LENS IMPLANT Bilateral     1992, 2001   • COLON SURGERY  2018    Dr. Alonso, Moderate Diverticulosis   • COLONOSCOPY     • CYSTOSCOPY  02/18/2020    excision of urethral prolapse w/ Dr. Ibanez   • CYSTOSCOPY  02/18/2020    Excision of Urethral Prolapse w/ Dr. Ibanez   • ENDOSCOPY  2018   • HEMORRHOIDECTOMY      2009/ 2014 hemorrhoid banding    • HYSTERECTOMY  1960    total    • LUMBAR LAMINECTOMY  07/2007   • MITRAL VALVE REPLACEMENT  11/17/2016    Dr. Singh in [place]   • PACEMAKER IMPLANTATION  11/2016    MRI compatible   • WRIST SURGERY Left         Current Outpatient Medications:   •  Bacillus Coagulans-Inulin (Probiotic) 1-250 BILLION-MG capsule, Probiotic 15 billion cell oral capsule take 2 capsules by oral route daily   Active, Disp: , Rfl:   •  erythromycin (ROMYCIN) 5 MG/GM ophthalmic ointment, Administer  into the left eye Every Night., Disp: 3.5 g, Rfl: 0  •  estradiol (ESTRACE) 0.1 MG/GM vaginal cream, Insert 1 g into the vagina Daily., Disp: 42.5 g, Rfl: 3  •  hydrocortisone 2.5 % cream, APPLY TO AFFECTED AREA TWICE A DAY AS DIRECTED, Disp: 28 g, Rfl: 3  •  hydrOXYzine (ATARAX) 25 MG tablet, Take 1 tab PO bid prn itching, Disp: 60 tablet, Rfl: 2  •  levothyroxine (SYNTHROID, LEVOTHROID) 50 MCG tablet, TAKE 1 TABLET BY MOUTH EVERY DAY, Disp: 90 tablet, Rfl: 1  •  lidocaine (LIDODERM) 5 %, Place 2 patches on the skin as directed by provider Daily. Remove & Discard patch within 12 hours or as directed by MD, Disp: 60 patch, Rfl: 0  •  LORazepam (ATIVAN) 0.5 MG tablet, Take 1 tablet by mouth Daily As Needed for Anxiety., Disp: 30 tablet, Rfl: 2  •  losartan (COZAAR) 25 MG tablet, Take 1 tablet by mouth Daily., Disp: 90 tablet, Rfl:  "1  •  Mirabegron ER (Myrbetriq) 25 MG tablet sustained-release 24 hour 24 hr tablet, Take 1 tablet by mouth Daily. Indications: lot [id number] exp 5/23 2 boxes, Disp: 90 tablet, Rfl: 0  •  mupirocin (BACTROBAN) 2 % ointment, APPLY SMALL AMOUNT TOPICALLY TO THE AFFECTED AREA THREE TIMES DAILY, Disp: 22 g, Rfl: 1  •  nebivolol (BYSTOLIC) 10 MG tablet, Take 1.5 tablets by mouth Every Night., Disp: 135 tablet, Rfl: 2  •  ondansetron (ZOFRAN) 4 MG tablet, Take 1 tablet by mouth Every 8 (Eight) Hours As Needed for Nausea or Vomiting., Disp: 30 tablet, Rfl: 2  •  Pfizer-BioNT COVID-19 Vac-Maryan 30 MCG/0.3ML suspension, , Disp: , Rfl:   •  polyethylene glycol (MIRALAX) 17 GM/SCOOP powder, 17 g., Disp: , Rfl:   •  Probiotic Product (PROBIOTIC BLEND PO), Probiotic, Disp: , Rfl:   •  spironolactone (ALDACTONE) 25 MG tablet, Take 1 tablet by mouth Every Other Day., Disp: 45 tablet, Rfl: 3    OBJECTIVE  Vital Signs:   /81 (BP Location: Left arm, Patient Position: Sitting, Cuff Size: Adult)   Pulse 78   Ht 157.5 cm (62\")   Wt 45.8 kg (101 lb)   LMP  (LMP Unknown)   SpO2 98%   Breastfeeding No   BMI 18.47 kg/m²    Estimated body mass index is 18.47 kg/m² as calculated from the following:    Height as of this encounter: 157.5 cm (62\").    Weight as of this encounter: 45.8 kg (101 lb).     Wt Readings from Last 3 Encounters:   09/20/22 45.8 kg (101 lb)   08/30/22 47.5 kg (104 lb 12.8 oz)   08/29/22 46.7 kg (103 lb)     BP Readings from Last 3 Encounters:   09/20/22 139/81   08/30/22 112/77   08/29/22 122/78       Physical Exam  Vitals reviewed.   Constitutional:       General: She is not in acute distress.     Appearance: Normal appearance. She is well-developed.   HENT:      Head: Normocephalic and atraumatic.   Eyes:      Conjunctiva/sclera: Conjunctivae normal.      Pupils: Pupils are equal, round, and reactive to light.   Cardiovascular:      Rate and Rhythm: Normal rate and regular rhythm.      Heart sounds: Normal " heart sounds. No murmur heard.  Pulmonary:      Effort: Pulmonary effort is normal. No respiratory distress.      Breath sounds: Normal breath sounds.   Skin:     General: Skin is warm and dry.   Neurological:      Mental Status: She is alert and oriented to person, place, and time.   Psychiatric:         Mood and Affect: Mood and affect normal.         Behavior: Behavior normal.         Thought Content: Thought content normal.         Judgment: Judgment normal.          Result Review    Common labs    Common Labs 2/1/22 2/15/22 3/21/22 3/21/22 3/21/22      0913 0913 0913   Glucose     85   BUN     33 (A)   Creatinine 1.40    1.23 (A)   Sodium     142   Potassium     4.2   Chloride     105   Calcium     9.9   Albumin     4.30   Total Bilirubin     0.5   Alkaline Phosphatase     50   AST (SGOT)     16   ALT (SGPT)     11   WBC  7.37 6.21     Hemoglobin  14.0 13.3     Hematocrit  41.6 39.1     Platelets  244 208     Total Cholesterol    161    Triglycerides    185 (A)    HDL Cholesterol    50    LDL Cholesterol     80    (A) Abnormal value       Comments are available for some flowsheets but are not being displayed.             MRI Cervical Spine Without Contrast    Result Date: 6/14/2022    1. Grade 1 retrolisthesis of C3 on C4 and degenerative change at this level cause moderate to severe spinal canal narrowing and bilateral neural foraminal narrowing. 2. Additional mild to moderate spinal canal stenosis and multilevel neural foraminal narrowing, which is most severe at C5-C6, as detailed above.     DAVID ANDREW MD       Electronically Signed and Approved By: DAVID ANDREW MD on 6/14/2022 at 16:51             US Soft Tissue    Result Date: 7/26/2022   Normal lymph nodes in the area of concern in the left neck.  If there is concern for neck mass or additional adenopathy, consider CT neck with IV contrast.      MANDIE EMANUEL MD       Electronically Signed and Approved By: MANDIE EMANUEL MD on 7/26/2022 at  13:48                 The above data has been reviewed by JACKLYN Cedillo 09/20/2022 15:31 EDT.          Patient Care Team:  Kika Stevenson APRN as PCP - General (Nurse Practitioner)        ASSESSMENT & PLAN    Diagnoses and all orders for this visit:    1. Urinary incontinence, unspecified type (Primary)  -     POC Urinalysis Dipstick, Automated    2. Tremors of nervous system  -     MRI Brain With & Without Contrast; Future  -     Ambulatory Referral to Neurology    3. Cervical pain (neck)  -     Ambulatory Referral to Neurosurgery    4. Hematuria, unspecified type  -     US Renal Bilateral; Future  -     Urinalysis With Microscopic If Indicated (No Culture) - Urine, Clean Catch; Future  -     Urine Culture - Urine, Urine, Clean Catch; Future         Tobacco Use: Low Risk    • Smoking Tobacco Use: Never Smoker   • Smokeless Tobacco Use: Never Used       Follow Up     Return in about 4 weeks (around 10/18/2022).      Patient was given instructions and counseling regarding her condition or for health maintenance advice. Please see specific information pulled into the AVS if appropriate.   I have reviewed information obtained and documented by others and I have confirmed the accuracy of this documented note.    JACKLYN Cedillo      Transcribed from ambient dictation for JACKLYN Cedillo by Catia Orr.  09/20/22   17:09 EDT    Patient verbalized consent to the visit recording.

## 2022-09-20 NOTE — ASSESSMENT & PLAN NOTE
- MRI of the brain with and without contrast.  - Referral to physical therapy.  - Referral to neurology.

## 2022-09-21 LAB
BACTERIA UR QL AUTO: ABNORMAL /HPF
BILIRUB BLD-MCNC: NEGATIVE MG/DL
CLARITY, POC: CLEAR
COLOR UR: YELLOW
EXPIRATION DATE: ABNORMAL
GLUCOSE UR STRIP-MCNC: NEGATIVE MG/DL
HYALINE CASTS UR QL AUTO: ABNORMAL /LPF
KETONES UR QL: NEGATIVE
LEUKOCYTE EST, POC: NEGATIVE
Lab: ABNORMAL
NITRITE UR-MCNC: NEGATIVE MG/ML
PH UR: 5.5 [PH] (ref 5–8)
PROT UR STRIP-MCNC: ABNORMAL MG/DL
RBC # UR STRIP: ABNORMAL /HPF
RBC # UR STRIP: ABNORMAL /UL
REF LAB TEST METHOD: ABNORMAL
SP GR UR: 1.03 (ref 1–1.03)
SQUAMOUS #/AREA URNS HPF: ABNORMAL /HPF
UROBILINOGEN UR QL: NORMAL
WBC # UR STRIP: ABNORMAL /HPF
YEAST URNS QL MICRO: ABNORMAL /HPF

## 2022-09-26 ENCOUNTER — LAB (OUTPATIENT)
Dept: LAB | Facility: HOSPITAL | Age: 87
End: 2022-09-26

## 2022-09-26 DIAGNOSIS — R31.9 HEMATURIA, UNSPECIFIED TYPE: ICD-10-CM

## 2022-09-26 PROCEDURE — 87086 URINE CULTURE/COLONY COUNT: CPT | Performed by: NURSE PRACTITIONER

## 2022-09-27 DIAGNOSIS — N95.2 ATROPHIC VAGINITIS: ICD-10-CM

## 2022-09-27 LAB — BACTERIA SPEC AEROBE CULT: NORMAL

## 2022-09-27 RX ORDER — ESTRADIOL 0.1 MG/G
1 CREAM VAGINAL DAILY
Qty: 127.5 G | Refills: 1 | Status: SHIPPED | OUTPATIENT
Start: 2022-09-27

## 2022-09-29 ENCOUNTER — CLINICAL SUPPORT (OUTPATIENT)
Dept: FAMILY MEDICINE CLINIC | Facility: CLINIC | Age: 87
End: 2022-09-29

## 2022-09-29 DIAGNOSIS — R31.9 HEMATURIA, UNSPECIFIED TYPE: Primary | ICD-10-CM

## 2022-09-29 LAB
BILIRUB BLD-MCNC: ABNORMAL MG/DL
CLARITY, POC: ABNORMAL
COLOR UR: YELLOW
EXPIRATION DATE: ABNORMAL
GLUCOSE UR STRIP-MCNC: NEGATIVE MG/DL
KETONES UR QL: NEGATIVE
LEUKOCYTE EST, POC: ABNORMAL
Lab: ABNORMAL
NITRITE UR-MCNC: NEGATIVE MG/ML
PH UR: 5.5 [PH] (ref 5–8)
PROT UR STRIP-MCNC: ABNORMAL MG/DL
RBC # UR STRIP: ABNORMAL /UL
SP GR UR: 1.03 (ref 1–1.03)
UROBILINOGEN UR QL: ABNORMAL

## 2022-09-29 PROCEDURE — 81003 URINALYSIS AUTO W/O SCOPE: CPT | Performed by: NURSE PRACTITIONER

## 2022-09-29 PROCEDURE — 99211 OFF/OP EST MAY X REQ PHY/QHP: CPT | Performed by: NURSE PRACTITIONER

## 2022-09-29 PROCEDURE — 87077 CULTURE AEROBIC IDENTIFY: CPT | Performed by: NURSE PRACTITIONER

## 2022-09-29 PROCEDURE — 87186 SC STD MICRODIL/AGAR DIL: CPT | Performed by: NURSE PRACTITIONER

## 2022-09-29 PROCEDURE — 87086 URINE CULTURE/COLONY COUNT: CPT | Performed by: NURSE PRACTITIONER

## 2022-09-30 ENCOUNTER — TELEPHONE (OUTPATIENT)
Dept: FAMILY MEDICINE CLINIC | Facility: CLINIC | Age: 87
End: 2022-09-30

## 2022-10-01 LAB — BACTERIA SPEC AEROBE CULT: ABNORMAL

## 2022-10-03 DIAGNOSIS — N39.0 URINARY TRACT INFECTION WITHOUT HEMATURIA, SITE UNSPECIFIED: Primary | ICD-10-CM

## 2022-10-03 RX ORDER — CEPHALEXIN 500 MG/1
500 CAPSULE ORAL 2 TIMES DAILY
Qty: 20 CAPSULE | Refills: 0 | Status: SHIPPED | OUTPATIENT
Start: 2022-10-03 | End: 2022-10-13

## 2022-10-03 RX ORDER — NITROFURANTOIN 25; 75 MG/1; MG/1
100 CAPSULE ORAL 2 TIMES DAILY
Qty: 14 CAPSULE | Refills: 0 | Status: SHIPPED | OUTPATIENT
Start: 2022-10-03 | End: 2022-10-10

## 2022-10-04 ENCOUNTER — TELEPHONE (OUTPATIENT)
Dept: NEUROLOGY | Facility: CLINIC | Age: 87
End: 2022-10-04

## 2022-10-04 ENCOUNTER — HOSPITAL ENCOUNTER (OUTPATIENT)
Dept: MRI IMAGING | Facility: HOSPITAL | Age: 87
Discharge: HOME | End: 2022-10-04
Admitting: NURSE PRACTITIONER

## 2022-10-04 ENCOUNTER — HOSPITAL ENCOUNTER (EMERGENCY)
Facility: HOSPITAL | Age: 87
Discharge: HOME | End: 2022-10-04
Attending: EMERGENCY MEDICINE | Admitting: EMERGENCY MEDICINE

## 2022-10-04 ENCOUNTER — TRANSCRIBE ORDERS (OUTPATIENT)
Dept: CARDIOLOGY | Facility: HOSPITAL | Age: 87
End: 2022-10-04

## 2022-10-04 ENCOUNTER — ANCILLARY ORDERS (OUTPATIENT)
Dept: FAMILY MEDICINE CLINIC | Facility: CLINIC | Age: 87
End: 2022-10-04

## 2022-10-04 ENCOUNTER — APPOINTMENT (OUTPATIENT)
Dept: GENERAL RADIOLOGY | Facility: HOSPITAL | Age: 87
End: 2022-10-04

## 2022-10-04 VITALS
RESPIRATION RATE: 18 BRPM | HEART RATE: 81 BPM | TEMPERATURE: 98 F | HEIGHT: 62 IN | WEIGHT: 100.31 LBS | BODY MASS INDEX: 18.46 KG/M2 | DIASTOLIC BLOOD PRESSURE: 94 MMHG | OXYGEN SATURATION: 96 % | SYSTOLIC BLOOD PRESSURE: 158 MMHG

## 2022-10-04 DIAGNOSIS — N39.0 ACUTE UTI: ICD-10-CM

## 2022-10-04 DIAGNOSIS — R25.1 TREMORS OF NERVOUS SYSTEM: ICD-10-CM

## 2022-10-04 DIAGNOSIS — R07.9 CHEST PAIN, UNSPECIFIED TYPE: Primary | ICD-10-CM

## 2022-10-04 DIAGNOSIS — I63.9 ISCHEMIC STROKE: Primary | ICD-10-CM

## 2022-10-04 DIAGNOSIS — I63.9 CEREBROVASCULAR ACCIDENT (CVA), UNSPECIFIED MECHANISM: Primary | ICD-10-CM

## 2022-10-04 LAB
ALBUMIN SERPL-MCNC: 4.4 G/DL (ref 3.5–5.2)
ALBUMIN/GLOB SERPL: 1.2 G/DL
ALP SERPL-CCNC: 66 U/L (ref 39–117)
ALT SERPL W P-5'-P-CCNC: 18 U/L (ref 1–33)
ANION GAP SERPL CALCULATED.3IONS-SCNC: 9.5 MMOL/L (ref 5–15)
ANISOCYTOSIS BLD QL: NORMAL
AST SERPL-CCNC: 21 U/L (ref 1–32)
BACTERIA UR QL AUTO: ABNORMAL /HPF
BASOPHILS # BLD AUTO: 0.05 10*3/MM3 (ref 0–0.2)
BASOPHILS NFR BLD AUTO: 0.7 % (ref 0–1.5)
BILIRUB SERPL-MCNC: 0.4 MG/DL (ref 0–1.2)
BILIRUB UR QL STRIP: NEGATIVE
BUN SERPL-MCNC: 51 MG/DL (ref 8–23)
BUN/CREAT SERPL: 33.3 (ref 7–25)
CALCIUM SPEC-SCNC: 10.6 MG/DL (ref 8.6–10.5)
CHLORIDE SERPL-SCNC: 106 MMOL/L (ref 98–107)
CLARITY UR: CLEAR
CLUMPED PLATELETS: PRESENT
CO2 SERPL-SCNC: 23.5 MMOL/L (ref 22–29)
COLOR UR: YELLOW
CREAT BLDA-MCNC: 1.7 MG/DL
CREAT SERPL-MCNC: 1.53 MG/DL (ref 0.57–1)
DEPRECATED RDW RBC AUTO: 51.3 FL (ref 37–54)
EGFRCR SERPLBLD CKD-EPI 2021: 28.9 ML/MIN/1.73
EGFRCR SERPLBLD CKD-EPI 2021: 32.8 ML/MIN/1.73
EOSINOPHIL # BLD AUTO: 0.13 10*3/MM3 (ref 0–0.4)
EOSINOPHIL NFR BLD AUTO: 1.7 % (ref 0.3–6.2)
ERYTHROCYTE [DISTWIDTH] IN BLOOD BY AUTOMATED COUNT: 12.9 % (ref 12.3–15.4)
GLOBULIN UR ELPH-MCNC: 3.6 GM/DL
GLUCOSE SERPL-MCNC: 109 MG/DL (ref 65–99)
GLUCOSE UR STRIP-MCNC: NEGATIVE MG/DL
HCT VFR BLD AUTO: 43.6 % (ref 34–46.6)
HGB BLD-MCNC: 14.3 G/DL (ref 12–15.9)
HGB UR QL STRIP.AUTO: ABNORMAL
HOLD SPECIMEN: NORMAL
HOLD SPECIMEN: NORMAL
HYALINE CASTS UR QL AUTO: ABNORMAL /LPF
IMM GRANULOCYTES # BLD AUTO: 0.03 10*3/MM3 (ref 0–0.05)
IMM GRANULOCYTES NFR BLD AUTO: 0.4 % (ref 0–0.5)
KETONES UR QL STRIP: ABNORMAL
LEUKOCYTE ESTERASE UR QL STRIP.AUTO: ABNORMAL
LYMPHOCYTES # BLD AUTO: 0.64 10*3/MM3 (ref 0.7–3.1)
LYMPHOCYTES NFR BLD AUTO: 8.6 % (ref 19.6–45.3)
MACROCYTES BLD QL SMEAR: NORMAL
MAGNESIUM SERPL-MCNC: 2.3 MG/DL (ref 1.6–2.4)
MCH RBC QN AUTO: 35.1 PG (ref 26.6–33)
MCHC RBC AUTO-ENTMCNC: 32.8 G/DL (ref 31.5–35.7)
MCV RBC AUTO: 107.1 FL (ref 79–97)
MONOCYTES # BLD AUTO: 0.51 10*3/MM3 (ref 0.1–0.9)
MONOCYTES NFR BLD AUTO: 6.8 % (ref 5–12)
NEUTROPHILS NFR BLD AUTO: 6.11 10*3/MM3 (ref 1.7–7)
NEUTROPHILS NFR BLD AUTO: 81.8 % (ref 42.7–76)
NITRITE UR QL STRIP: POSITIVE
NRBC BLD AUTO-RTO: 0 /100 WBC (ref 0–0.2)
PH UR STRIP.AUTO: <=5 [PH] (ref 5–8)
PLATELET # BLD AUTO: 196 10*3/MM3 (ref 140–450)
PMV BLD AUTO: 9.8 FL (ref 6–12)
POTASSIUM SERPL-SCNC: 5.1 MMOL/L (ref 3.5–5.2)
PROT SERPL-MCNC: 8 G/DL (ref 6–8.5)
PROT UR QL STRIP: ABNORMAL
RBC # BLD AUTO: 4.07 10*6/MM3 (ref 3.77–5.28)
RBC # UR STRIP: ABNORMAL /HPF
REF LAB TEST METHOD: ABNORMAL
SMALL PLATELETS BLD QL SMEAR: ADEQUATE
SODIUM SERPL-SCNC: 139 MMOL/L (ref 136–145)
SP GR UR STRIP: 1.03 (ref 1–1.03)
SQUAMOUS #/AREA URNS HPF: ABNORMAL /HPF
TROPONIN T SERPL-MCNC: 0.02 NG/ML (ref 0–0.03)
UROBILINOGEN UR QL STRIP: ABNORMAL
WBC # UR STRIP: ABNORMAL /HPF
WBC MORPH BLD: NORMAL
WBC NRBC COR # BLD: 7.47 10*3/MM3 (ref 3.4–10.8)
WHOLE BLOOD HOLD COAG: NORMAL
WHOLE BLOOD HOLD SPECIMEN: NORMAL

## 2022-10-04 PROCEDURE — 93010 ELECTROCARDIOGRAM REPORT: CPT | Performed by: INTERNAL MEDICINE

## 2022-10-04 PROCEDURE — 71045 X-RAY EXAM CHEST 1 VIEW: CPT

## 2022-10-04 PROCEDURE — 70551 MRI BRAIN STEM W/O DYE: CPT

## 2022-10-04 PROCEDURE — 81001 URINALYSIS AUTO W/SCOPE: CPT | Performed by: EMERGENCY MEDICINE

## 2022-10-04 PROCEDURE — 85007 BL SMEAR W/DIFF WBC COUNT: CPT | Performed by: EMERGENCY MEDICINE

## 2022-10-04 PROCEDURE — 99284 EMERGENCY DEPT VISIT MOD MDM: CPT

## 2022-10-04 PROCEDURE — 36415 COLL VENOUS BLD VENIPUNCTURE: CPT | Performed by: EMERGENCY MEDICINE

## 2022-10-04 PROCEDURE — 85025 COMPLETE CBC W/AUTO DIFF WBC: CPT | Performed by: EMERGENCY MEDICINE

## 2022-10-04 PROCEDURE — 84484 ASSAY OF TROPONIN QUANT: CPT | Performed by: EMERGENCY MEDICINE

## 2022-10-04 PROCEDURE — 93005 ELECTROCARDIOGRAM TRACING: CPT

## 2022-10-04 PROCEDURE — 36415 COLL VENOUS BLD VENIPUNCTURE: CPT

## 2022-10-04 PROCEDURE — 83735 ASSAY OF MAGNESIUM: CPT | Performed by: EMERGENCY MEDICINE

## 2022-10-04 PROCEDURE — 82565 ASSAY OF CREATININE: CPT

## 2022-10-04 PROCEDURE — 80053 COMPREHEN METABOLIC PANEL: CPT | Performed by: EMERGENCY MEDICINE

## 2022-10-04 RX ORDER — ASPIRIN 81 MG/1
324 TABLET, CHEWABLE ORAL ONCE
Status: DISCONTINUED | OUTPATIENT
Start: 2022-10-04 | End: 2022-10-04

## 2022-10-04 RX ORDER — CEPHALEXIN 500 MG/1
500 CAPSULE ORAL 3 TIMES DAILY
Qty: 21 CAPSULE | Refills: 0 | Status: SHIPPED | OUTPATIENT
Start: 2022-10-04 | End: 2022-10-17

## 2022-10-04 RX ORDER — SODIUM CHLORIDE 0.9 % (FLUSH) 0.9 %
10 SYRINGE (ML) INJECTION AS NEEDED
Status: DISCONTINUED | OUTPATIENT
Start: 2022-10-04 | End: 2022-10-04 | Stop reason: HOSPADM

## 2022-10-04 NOTE — TELEPHONE ENCOUNTER
PT HAD AN MRI, RESULTS SHOW RESULTS. PT IS APPEARING TO HAVING AN HEMORAGE. PCP CALLING TO SEE IF PT NEEDS TO SEE [facility]OPILLA SOONER THAN DEC OR NEEDS TO GO TO THE ER.     [phone]

## 2022-10-04 NOTE — DISCHARGE INSTRUCTIONS
Return should you change your mind about further evaluation and or if he have worsening symptoms or concerns.

## 2022-10-04 NOTE — ED PROVIDER NOTES
Time: 6:25 PM EDT  Arrived by: private car  Chief Complaint: dizziness, stroke  History provided by: Pt  History is limited by: N/A     History of Present Illness:  Patient is a 87 y.o. year old female who presents to the emergency department with dizziness and weakness that has been ongoing for several months. Pt was sent her due to abnormalities in her MRI. Pt is able to correctly identify her place and the year. Pt reports a ruptured disc in her back that has been hurting her for a while. Pt lives with her . Pt does not take aspirin every day but has been taking Tylenol for her back.       Similar Symptoms Previously: no  Recently seen: yes      Patient Care Team  Primary Care Provider: Kika Stevenson APRN    Past Medical History:     Allergies   Allergen Reactions   • Linzess [Linaclotide] Diarrhea   • Duloxetine Unknown - Low Severity   • Zolpidem Unknown - Low Severity   • Zolpidem Tartrate Other (See Comments)   • Aspirin Unknown - Low Severity   • Ciprofloxacin Diarrhea and Unknown - High Severity   • Duloxetine Hcl GI Intolerance   • Ibandronic Acid Unknown - Low Severity   • Levofloxacin Mental Status Change   • Metronidazole Unknown - Low Severity   • Nitrofurantoin Unknown - Low Severity   • Nortriptyline Hcl Delirium   • Pregabalin Unknown - Low Severity   • Ranitidine Hcl Unknown - Low Severity   • Sulfa Antibiotics Hives   • Sulfamethoxazole-Trimethoprim Unknown - Low Severity   • Tramadol Unknown - Low Severity     Past Medical History:   Diagnosis Date   • Anemia    • Anxiety    • Arthritis     spine   • Back pain    • Cramps, muscle, general    • Degenerative lumbar disc 05/04/2014    LUMBAR /LS DISC DEGENERATION   • Difficulty in swallowing    • Dizziness    • Dysuria 05/24/2019   • EKG abnormality 08/12/2016   • Fatigue    • Headache    • Hearing loss    • Hyperlipidemia    • Hypertension, essential    • Hypothyroidism    • Imbalance    • Incontinence in female    • Ingrowing  toenail    • Insomnia    • Iron deficiency anemia 02/01/2017   • Joint pain    • Leg pain    • Lightheadedness 03/14/2018    The patient reports onset of lightheadedness following heart surgery in 2016. She notes that this tends to be brought on by a change in position. I would consider orthostasis as a potential culprit. I will attempt to pursue orthostatic blood pressures but the patient notes that it is hard for her to lay flaat on the exam table. I will request her records be sent for my review.    • Low back pain 06/09/2014   • Lumbar degenerative disc disease 02/01/2017   • Macular degeneration     Dharmesh   • Mitral valve disorder    • Mitral valve replaced 12/07/2017   • Nocturia 05/24/2019   • Pacemaker 02/23/2018   • Peptic ulcer disease 02/01/2017   • Ringing in ear    • Sciatica 01/09/2015   • Shortness of breath    • Urethral caruncle 05/24/2019   • Vision changes    • Weakness      Past Surgical History:   Procedure Laterality Date   • APPENDECTOMY  1951   • BREAST BIOPSY Left 1955   • CATARACT EXTRACTION WITH INTRAOCULAR LENS IMPLANT Bilateral     1992, 2001   • COLON SURGERY  2018    Dr. Alonso, Moderate Diverticulosis   • COLONOSCOPY     • CYSTOSCOPY  02/18/2020    excision of urethral prolapse w/ Dr. Ibanez   • CYSTOSCOPY  02/18/2020    Excision of Urethral Prolapse w/ Dr. Ibanez   • ENDOSCOPY  2018   • HEMORRHOIDECTOMY      2009/ 2014 hemorrhoid banding    • HYSTERECTOMY  1960    total    • LUMBAR LAMINECTOMY  07/2007   • MITRAL VALVE REPLACEMENT  11/17/2016    Dr. Singh in [place]   • PACEMAKER IMPLANTATION  11/2016    MRI compatible   • WRIST SURGERY Left      Family History   Problem Relation Age of Onset   • Heart disease Father    • Diabetes Father    • Heart disease Sister    • Diabetes Sister    • Heart disease Other    • Heart disease Other    • Colon cancer Neg Hx        Home Medications:  Prior to Admission medications    Medication Sig Start Date End Date Taking?  Authorizing Provider   Bacillus Coagulans-Inulin (Probiotic) 1-250 BILLION-MG capsule Probiotic 15 billion cell oral capsule take 2 capsules by oral route daily   Active    Provider, MD Raulito   cephalexin (Keflex) 500 MG capsule Take 1 capsule by mouth 2 (Two) Times a Day for 10 days. 10/3/22 10/13/22  Kika Stevenson APRN   erythromycin (ROMYCIN) 5 MG/GM ophthalmic ointment Administer  into the left eye Every Night. 8/30/22   Kika Stevenson APRN   estradiol (ESTRACE) 0.1 MG/GM vaginal cream INSERT 1 G INTO THE VAGINA DAILY. 9/27/22   Kika Stevenson APRN   hydrocortisone 2.5 % cream APPLY TO AFFECTED AREA TWICE A DAY AS DIRECTED 6/13/22   Pedrito Rios MD   hydrOXYzine (ATARAX) 25 MG tablet Take 1 tab PO bid prn itching 8/30/22   Kika Stevenson APRN   levothyroxine (SYNTHROID, LEVOTHROID) 50 MCG tablet TAKE 1 TABLET BY MOUTH EVERY DAY 6/13/22   Kika Stevenson APRN   lidocaine (LIDODERM) 5 % Place 2 patches on the skin as directed by provider Daily. Remove & Discard patch within 12 hours or as directed by MD 6/15/22   Kika Stevenson APRN   LORazepam (ATIVAN) 0.5 MG tablet Take 1 tablet by mouth Daily As Needed for Anxiety. 5/4/22   Kika Stevenson APRN   losartan (COZAAR) 25 MG tablet Take 1 tablet by mouth Daily. 8/30/22   Kika Stevenson APRN   Mirabegron ER (Myrbetriq) 25 MG tablet sustained-release 24 hour 24 hr tablet Take 1 tablet by mouth Daily. Indications: lot [id number] exp 5/23 2 boxes 6/28/22   Kika Stevenson APRN   mupirocin (BACTROBAN) 2 % ointment APPLY SMALL AMOUNT TOPICALLY TO THE AFFECTED AREA THREE TIMES DAILY 8/27/21   Kika Stevenson APRN   nebivolol (BYSTOLIC) 10 MG tablet Take 1.5 tablets by mouth Every Night. 4/13/22   Daniel Anthony MD   nitrofurantoin, macrocrystal-monohydrate, (Macrobid) 100 MG capsule Take 1 capsule by mouth 2 (Two) Times a Day for 7 days. 10/3/22 10/10/22  Kika Stevenson  "JACKLYN Story   ondansetron (ZOFRAN) 4 MG tablet Take 1 tablet by mouth Every 8 (Eight) Hours As Needed for Nausea or Vomiting. 5/4/22   Kika Stevenson APRN   Pfizer-BioNT COVID-19 Vac-Maryan 30 MCG/0.3ML suspension  8/9/22   ProviderRaulito MD   polyethylene glycol (MIRALAX) 17 GM/SCOOP powder 17 g.    Raulito Cota MD   Probiotic Product (PROBIOTIC BLEND PO) Probiotic    Provider, MD Raulito   spironolactone (ALDACTONE) 25 MG tablet Take 1 tablet by mouth Every Other Day. 8/29/22   Verónica Saucedo APRN        Social History:   Social History     Tobacco Use   • Smoking status: Never Smoker   • Smokeless tobacco: Never Used   Vaping Use   • Vaping Use: Never used   Substance Use Topics   • Alcohol use: Never   • Drug use: Never     Recent travel: no     Review of Systems:  Review of Systems   Constitutional: Negative for chills and fever.   HENT: Negative for congestion, ear pain and sore throat.    Eyes: Negative for pain.   Respiratory: Negative for cough, chest tightness and shortness of breath.    Cardiovascular: Negative for chest pain.   Gastrointestinal: Negative for abdominal pain, diarrhea, nausea and vomiting.   Genitourinary: Negative for flank pain and hematuria.   Musculoskeletal: Negative for joint swelling.   Skin: Negative for pallor.   Neurological: Positive for dizziness and weakness. Negative for seizures and headaches.   All other systems reviewed and are negative.       Physical Exam:  /86   Pulse 75   Temp 98 °F (36.7 °C)   Resp 18   Ht 157.5 cm (62\")   Wt 45.5 kg (100 lb 5 oz)   LMP  (LMP Unknown)   SpO2 98%   BMI 18.35 kg/m²     Physical Exam  Vitals and nursing note reviewed.   Constitutional:       General: She is not in acute distress.     Appearance: Normal appearance. She is not toxic-appearing.   HENT:      Head: Normocephalic and atraumatic.      Mouth/Throat:      Mouth: Mucous membranes are moist.   Eyes:      General: No scleral " icterus.  Cardiovascular:      Rate and Rhythm: Normal rate and regular rhythm.      Pulses: Normal pulses.      Heart sounds: Normal heart sounds.   Pulmonary:      Effort: Pulmonary effort is normal. No respiratory distress.      Breath sounds: Normal breath sounds.   Abdominal:      General: Abdomen is flat.      Palpations: Abdomen is soft.      Tenderness: There is no abdominal tenderness.   Musculoskeletal:         General: Normal range of motion.      Cervical back: Normal range of motion and neck supple.   Skin:     General: Skin is warm and dry.   Neurological:      Mental Status: She is alert and oriented to person, place, and time. Mental status is at baseline.                Medications in the Emergency Department:  Medications   sodium chloride 0.9 % flush 10 mL (has no administration in time range)        Labs  Lab Results (last 24 hours)     Procedure Component Value Units Date/Time    POC Creatinine [[id number]]  (Abnormal) Collected: 10/04/22 1406    Specimen: Blood Updated: 10/04/22 1425     Creatinine 1.70 mg/dL      Comment: Serial Number: [id number]:  [id number]        eGFR 28.9 mL/min/1.73     CBC & Differential [[id number]]  (Abnormal) Collected: 10/04/22 1639    Specimen: Blood from Arm, Right Updated: 10/04/22 1717    Narrative:      The following orders were created for panel order CBC & Differential.  Procedure                               Abnormality         Status                     ---------                               -----------         ------                     CBC Auto Differential[[id number]]        Abnormal            Final result               Scan Slide[[id number]]                                       Final result                 Please view results for these tests on the individual orders.    Comprehensive Metabolic Panel [[id number]]  (Abnormal) Collected: 10/04/22 1639    Specimen: Blood from Arm, Right Updated: 10/04/22 1717     Glucose 109 mg/dL      BUN 51 mg/dL       Creatinine 1.53 mg/dL      Sodium 139 mmol/L      Potassium 5.1 mmol/L      Chloride 106 mmol/L      CO2 23.5 mmol/L      Calcium 10.6 mg/dL      Total Protein 8.0 g/dL      Albumin 4.40 g/dL      ALT (SGPT) 18 U/L      AST (SGOT) 21 U/L      Alkaline Phosphatase 66 U/L      Total Bilirubin 0.4 mg/dL      Globulin 3.6 gm/dL      A/G Ratio 1.2 g/dL      BUN/Creatinine Ratio 33.3     Anion Gap 9.5 mmol/L      eGFR 32.8 mL/min/1.73      Comment: National Kidney Foundation and American Society of Nephrology (ASN) Task Force recommended calculation based on the Chronic Kidney Disease Epidemiology Collaboration (CKD-EPI) equation refit without adjustment for race.       Narrative:      GFR Normal >60  Chronic Kidney Disease <60  Kidney Failure <15      Troponin [[id number]]  (Normal) Collected: 10/04/22 1639    Specimen: Blood from Arm, Right Updated: 10/04/22 1714     Troponin T 0.016 ng/mL     Narrative:      Troponin T Reference Range:  <= 0.03 ng/mL-   Negative for AMI  >0.03 ng/mL-     Abnormal for myocardial necrosis.  Clinicians would have to utilize clinical acumen, EKG, Troponin and serial changes to determine if it is an Acute Myocardial Infarction or myocardial injury due to an underlying chronic condition.       Results may be falsely decreased if patient taking Biotin.      Magnesium [[id number]]  (Normal) Collected: 10/04/22 1639    Specimen: Blood from Arm, Right Updated: 10/04/22 1717     Magnesium 2.3 mg/dL     CBC Auto Differential [[id number]]  (Abnormal) Collected: 10/04/22 1639    Specimen: Blood from Arm, Right Updated: 10/04/22 1715     WBC 7.47 10*3/mm3      RBC 4.07 10*6/mm3      Hemoglobin 14.3 g/dL      Hematocrit 43.6 %      .1 fL      MCH 35.1 pg      MCHC 32.8 g/dL      RDW 12.9 %      RDW-SD 51.3 fl      MPV 9.8 fL      Platelets 196 10*3/mm3      Neutrophil % 81.8 %      Lymphocyte % 8.6 %      Monocyte % 6.8 %      Eosinophil % 1.7 %      Basophil % 0.7 %      Immature Grans % 0.4 %       Neutrophils, Absolute 6.11 10*3/mm3      Lymphocytes, Absolute 0.64 10*3/mm3      Monocytes, Absolute 0.51 10*3/mm3      Eosinophils, Absolute 0.13 10*3/mm3      Basophils, Absolute 0.05 10*3/mm3      Immature Grans, Absolute 0.03 10*3/mm3      nRBC 0.0 /100 WBC     Scan Slide [[id number]] Collected: 10/04/22 1639    Specimen: Blood from Arm, Right Updated: 10/04/22 1717     Anisocytosis Slight/1+     Macrocytes Slight/1+     WBC Morphology Normal     Platelet Estimate Adequate     Clumped Platelets Present    Urinalysis With Microscopic If Indicated (No Culture) - Urine, Clean Catch [[id number]]  (Abnormal) Collected: 10/04/22 1734    Specimen: Urine, Clean Catch Updated: 10/04/22 1804     Color, UA Yellow     Appearance, UA Clear     pH, UA <=5.0     Specific Gravity, UA 1.027     Glucose, UA Negative     Ketones, UA Trace     Bilirubin, UA Negative     Blood, UA Moderate (2+)     Protein, UA 30 mg/dL (1+)     Leuk Esterase, UA Small (1+)     Nitrite, UA Positive     Urobilinogen, UA 0.2 E.U./dL    Urinalysis, Microscopic Only - Urine, Clean Catch [[id number]]  (Abnormal) Collected: 10/04/22 1734    Specimen: Urine, Clean Catch Updated: 10/04/22 1804     RBC, UA 3-5 /HPF      WBC, UA Too Numerous to Count /HPF      Bacteria, UA None Seen /HPF      Squamous Epithelial Cells, UA 3-6 /HPF      Hyaline Casts, UA 3-6 /LPF      Methodology Automated Microscopy           Imaging:  MRI Brain Without Contrast    Result Date: 10/4/2022  PROCEDURE: MRI BRAIN WO CONTRAST  COMPARISON: [facility], MR, BRAIN W/WO CONTRAST, 10/07/2015, 12:13.  [facility], MR, MRA BRAIN W/O CONTRAST, 7/12/2018, 14:34.  INDICATIONS: NEW ONSET OF TREMORS      TECHNIQUE: A variety of imaging planes and parameters were utilized for visualization of suspected pathology.  Images were performed without contrast.  FINDINGS:  There are several scattered foci of restricted diffusion in the cerebral hemispheres  bilaterally consistent with acute lacunar infarcts.  The largest is in the right frontal lobe measuring 0.7 cm.  In the left frontal centrum semiovale is a focus of susceptibility artifact consistent with previous hemorrhage.  No mass effect is demonstrated.  The ventricles are normal in size and configuration.  Intravenous contrast was not given to assess for abnormal enhancement.  Mild T2 high signal abnormality in the cerebral white matter is consistent with small vessel ischemic disease.  The visualized extracranial soft tissues appear unremarkable.  No focal calvarial abnormality is seen.         1. Multiple acute lacunar infarcts in the cerebral hemispheres bilaterally 2. Age indeterminate focus of hemorrhage in the left frontal centrum semiovale 3. Mild small vessel ischemic changes in the white matter      Ruslan Jarrell M.D.       Electronically Signed and Approved By: Ruslan Jarrell M.D. on 10/04/2022 at 15:20             XR Chest 1 View    Result Date: 10/4/2022  PROCEDURE: XR CHEST 1 VW  COMPARISON: [facility], CR, CHEST PA/AP & LAT 2V, 3/14/2017, 10:38.  [facility], CR, CHEST PA/AP & LAT 2V, 7/12/2018, 13:22.  INDICATIONS: Weak/Dizzy/AMS triage protocol  FINDINGS:  There is underlying emphysema.  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  No acute infiltrates.  Density in the left lung base may be artifactual, atelectasis and or airspace disease is not excluded.  The remainder the lungs are radiographically clear.  Cardiac, hilar, and mediastinal silhouettes are stable sternotomy wires and cardiac valve and a dual chambered pacemaker. No pneumothorax or pleural effusion. Bony structures are intact.  The trachea is midline.           1. Underlying emphysema and question patchy faint density in the left lung base which could be atelectasis, airspace disease or artifact.  It does not obscure the diaphragm. 2. Underlying emphysema and stable changes from previous  cardiac surgery and pacemaker placement.       AMAURY OAKES DO       Electronically Signed and Approved By: AMAURY OAKES DO on 10/04/2022 at 17:24               Procedures:  Procedures    Progress                            Medical Decision Making:  MDM     Final diagnoses:   None        Disposition:  ED Disposition     None          This medical record created using voice recognition software.             Yasmin Snowden  10/04/22 [id number]       Jonas Mcdaniel MD  10/10/22 [id number]

## 2022-10-04 NOTE — ED PROVIDER NOTES
Subjective   History of Present Illness  Patient presents complaining of several month history of mild to moderate weak and dizziness.  She underwent an MRI of the brain today and was told to come to the ER after there were abnormal findings noted.  At this time she states she feels well and wishes to go home.  There are no exacerbating alleviating factors.  She denies headache or chest pain at this time.  She does complain of chronic back pain which has not gotten acutely worse of late.        Review of Systems   Constitutional: Negative for chills and fever.   HENT: Negative for congestion, ear pain and sore throat.    Eyes: Negative for pain.   Respiratory: Negative for cough, chest tightness and shortness of breath.    Cardiovascular: Negative for chest pain.   Gastrointestinal: Negative for abdominal pain, diarrhea, nausea and vomiting.   Genitourinary: Negative for flank pain and hematuria.   Musculoskeletal: Negative for joint swelling.   Skin: Negative for pallor.   Neurological: Negative for seizures and headaches.   All other systems reviewed and are negative.      Past Medical History:   Diagnosis Date   • Anemia    • Anxiety    • Arthritis     spine   • Back pain    • Cramps, muscle, general    • Degenerative lumbar disc 05/04/2014    LUMBAR /LS DISC DEGENERATION   • Difficulty in swallowing    • Dizziness    • Dysuria 05/24/2019   • EKG abnormality 08/12/2016   • Fatigue    • Headache    • Hearing loss    • Hyperlipidemia    • Hypertension, essential    • Hypothyroidism    • Imbalance    • Incontinence in female    • Ingrowing toenail    • Insomnia    • Iron deficiency anemia 02/01/2017   • Joint pain    • Leg pain    • Lightheadedness 03/14/2018    The patient reports onset of lightheadedness following heart surgery in 2016. She notes that this tends to be brought on by a change in position. I would consider orthostasis as a potential culprit. I will attempt to pursue orthostatic blood pressures but  the patient notes that it is hard for her to lay flaat on the exam table. I will request her records be sent for my review.    • Low back pain 06/09/2014   • Lumbar degenerative disc disease 02/01/2017   • Macular degeneration     Dharmesh   • Mitral valve disorder    • Mitral valve replaced 12/07/2017   • Nocturia 05/24/2019   • Pacemaker 02/23/2018   • Peptic ulcer disease 02/01/2017   • Ringing in ear    • Sciatica 01/09/2015   • Shortness of breath    • Urethral caruncle 05/24/2019   • Vision changes    • Weakness        Allergies   Allergen Reactions   • Linzess [Linaclotide] Diarrhea   • Duloxetine Unknown - Low Severity   • Zolpidem Unknown - Low Severity   • Zolpidem Tartrate Other (See Comments)   • Aspirin Unknown - Low Severity   • Ciprofloxacin Diarrhea and Unknown - High Severity   • Duloxetine Hcl GI Intolerance   • Ibandronic Acid Unknown - Low Severity   • Levofloxacin Mental Status Change   • Metronidazole Unknown - Low Severity   • Nitrofurantoin Unknown - Low Severity   • Nortriptyline Hcl Delirium   • Pregabalin Unknown - Low Severity   • Ranitidine Hcl Unknown - Low Severity   • Sulfa Antibiotics Hives   • Sulfamethoxazole-Trimethoprim Unknown - Low Severity   • Tramadol Unknown - Low Severity       Past Surgical History:   Procedure Laterality Date   • APPENDECTOMY  1951   • BREAST BIOPSY Left 1955   • CATARACT EXTRACTION WITH INTRAOCULAR LENS IMPLANT Bilateral     1992, 2001   • COLON SURGERY  2018    Dr. Alonso, Moderate Diverticulosis   • COLONOSCOPY     • CYSTOSCOPY  02/18/2020    excision of urethral prolapse w/ Dr. Ibanez   • CYSTOSCOPY  02/18/2020    Excision of Urethral Prolapse w/ Dr. Ibanez   • ENDOSCOPY  2018   • HEMORRHOIDECTOMY      2009/ 2014 hemorrhoid banding    • HYSTERECTOMY  1960    total    • LUMBAR LAMINECTOMY  07/2007   • MITRAL VALVE REPLACEMENT  11/17/2016    Dr. Singh in [place]   • PACEMAKER IMPLANTATION  11/2016    MRI compatible   • WRIST SURGERY Left         Family History   Problem Relation Age of Onset   • Heart disease Father    • Diabetes Father    • Heart disease Sister    • Diabetes Sister    • Heart disease Other    • Heart disease Other    • Colon cancer Neg Hx        Social History     Socioeconomic History   • Marital status:    Tobacco Use   • Smoking status: Never Smoker   • Smokeless tobacco: Never Used   Vaping Use   • Vaping Use: Never used   Substance and Sexual Activity   • Alcohol use: Never   • Drug use: Never   • Sexual activity: Not Currently     Partners: Male           Objective   Physical Exam  Constitutional:       Appearance: Normal appearance.   HENT:      Head: Normocephalic and atraumatic.      Nose: Nose normal.      Mouth/Throat:      Mouth: Mucous membranes are moist.   Eyes:      Extraocular Movements: Extraocular movements intact.      Conjunctiva/sclera: Conjunctivae normal.      Pupils: Pupils are equal, round, and reactive to light.   Cardiovascular:      Rate and Rhythm: Normal rate and regular rhythm.      Pulses: Normal pulses.      Heart sounds: Normal heart sounds.   Pulmonary:      Effort: Pulmonary effort is normal.      Breath sounds: Normal breath sounds. No wheezing.   Abdominal:      Palpations: Abdomen is soft.      Tenderness: There is no abdominal tenderness.   Musculoskeletal:         General: Normal range of motion.      Cervical back: Normal range of motion and neck supple.      Right lower leg: No edema.      Left lower leg: No edema.   Skin:     General: Skin is warm and dry.      Capillary Refill: Capillary refill takes less than 2 seconds.      Findings: No rash.   Neurological:      General: No focal deficit present.      Mental Status: She is alert and oriented to person, place, and time. Mental status is at baseline.      Cranial Nerves: No cranial nerve deficit.      Sensory: No sensory deficit.      Motor: No weakness.   Psychiatric:         Mood and Affect: Mood normal.         Behavior:  Behavior normal.         Procedures           ED Course                                           MDM  Number of Diagnoses or Management Options  Acute UTI  Cerebrovascular accident (CVA), unspecified mechanism (HCC)  Diagnosis management comments: Patient presents complaining of weakness and dizziness as well as having undergone an MRI earlier today that was reportedly abnormal.  Differential diagnostic considerations include but not limited to UTI versus stroke.  Urinalysis is positive for nitrites.  MRI of the brain demonstrates findings of multiple lacunar infarcts as well as possible area of focal hemorrhage.  Chemistries remarkable for creatinine 1.5 CBC unremarkable.  Patient was advised of the findings and recommendation was for admission which she adamantly refused.  She is completely oriented and discussion was held in front of her  and daughter and the patient with judgment otherwise intact persist and wishing to leave and understands risks including possible catastrophic stroke and/or death and despite demonstrating an understanding persists and her desire to leave and she is discharged.  Antibiotics prescribed for the UTI with close follow-up recommended with her PCP.  I did consider aspirin however and with holding that recommendation as there is a possible area of hemorrhage in the brain on MRI.       Amount and/or Complexity of Data Reviewed  Clinical lab tests: reviewed  Tests in the radiology section of CPT®: reviewed        Final diagnoses:   Cerebrovascular accident (CVA), unspecified mechanism (HCC)   Acute UTI       ED Disposition  ED Disposition     ED Disposition   Discharge    Condition   Stable    Comment   --             Kika Stevenson, APRN  [address] [address]  [phone]    Schedule an appointment as soon as possible for a visit            Medication List      Changed    * cephalexin 500 MG capsule  Commonly known as: Keflex  Take 1 capsule by  mouth 2 (Two) Times a Day for 10 days.  What changed: Another medication with the same name was added. Make sure you understand how and when to take each.     * cephalexin 500 MG capsule  Commonly known as: KEFLEX  Take 1 capsule by mouth 3 (Three) Times a Day.  What changed: You were already taking a medication with the same name, and this prescription was added. Make sure you understand how and when to take each.         * This list has 2 medication(s) that are the same as other medications prescribed for you. Read the directions carefully, and ask your doctor or other care provider to review them with you.               Where to Get Your Medications      These medications were sent to [facility]/pharmacy #[id number] - [place], [place] - [address] - [phone] [facility] [phone] FX  [address] [place] [address]    Hours: 24-hours Phone: [phone]   · cephalexin 500 MG capsule          Jonas Mcdaniel MD  10/04/22 1142       Jonas Mcdaniel MD  10/10/22 9961

## 2022-10-05 ENCOUNTER — TELEPHONE (OUTPATIENT)
Dept: NEUROLOGY | Facility: CLINIC | Age: 87
End: 2022-10-05

## 2022-10-05 ENCOUNTER — TELEPHONE (OUTPATIENT)
Dept: FAMILY MEDICINE CLINIC | Facility: CLINIC | Age: 87
End: 2022-10-05

## 2022-10-05 NOTE — TELEPHONE ENCOUNTER
Provider: SINA KAUR MD     Caller: JOSHUA    Relationship to Patient:DR BRYON HOLGUIN    Phone Number: [phone]      Reason for Call: STATED PT WENT TO THE ER LAST NIGHT.  STATED PT WAS DX WITH  LACUNAR INFARCS, FOCUS OF HEMORRHAGE  & LEFT FRONTAL  CENTRUM SEMIOVALE.  PT IS SCHEDULED IS SCHEDULED FOR 12-27-22.   PT NEEDS TO BE SEEN SOONER PER JOSHUA.  DOES OFFICE HAVE ANYTHING SOONER?  .  PLEASE CALL & ADVISE

## 2022-10-06 NOTE — TELEPHONE ENCOUNTER
Caller: TORY HITCHCOCK    Relationship: Emergency Contact    Best call back number: [phone]    What test was performed: MRI OF BRAIN    When was the test performed: 10/4/22    Additional notes: TORY STATED THAT SHE IS NEEDING CALL TO DISCUSS RESULTS AND HAS QUESTIONS ABOUT STROKE FINDINGS AND OTHER RECENT TEST RESULTS AND MEDICATIONS THE PATIENT HAS BEEN ON RECENTLY.     
I looked and the patient was scheduled an appointment for 11/09/2022 with Dr. Graves.  She was scheduled on 12/27/22 with Dr. Graves  
Phoned the HUB and the put in a message to see if the patient can be seen sooner than Dec 17 I advised the patient cannot wait until that date and needs sooner appointment.  
Talked w/daughter she is agreeable w/plan to see neurology-can we call to get her an appt ASAP due to acute stroke-she was seen in ER and it shows on her MRI  
General

## 2022-10-10 ENCOUNTER — TELEPHONE (OUTPATIENT)
Dept: NEUROLOGY | Facility: CLINIC | Age: 87
End: 2022-10-10

## 2022-10-10 ENCOUNTER — TELEPHONE (OUTPATIENT)
Dept: CASE MANAGEMENT | Facility: OTHER | Age: 87
End: 2022-10-10

## 2022-10-10 NOTE — TELEPHONE ENCOUNTER
Caller: ONEAL    Relationship: PCP OFFICE    Best call back number: [phone]    What is the best time to reach you: ANY    Who are you requesting to speak with (clinical staff, provider,  specific staff member): NATASHA    What was the call regarding: EARLIER APPT TIME    Do you require a callback: PCP IS CALLING TO REEQUEST EARLIER APPT FOR PATIENT OR A CALL BACK REGARDING WHAT THEY CAN DO INBETWEEN TIME UNTIL THE DOCTOR CAN SEE THE PATIENT. PATIENT HAS NEW PT APPT W/ DR KAUR ON 11/9/22 @  2PM    PLEASE CALL & ADVISE    THANK YOU

## 2022-10-10 NOTE — TELEPHONE ENCOUNTER
----- Message from JACKLYN Cedillo sent at 10/7/2022  4:54 PM EDT -----  That is still a month out do they have any recommendations on what they would like her to do in the interim?

## 2022-10-11 ENCOUNTER — OFFICE VISIT (OUTPATIENT)
Dept: NEUROSURGERY | Facility: CLINIC | Age: 87
End: 2022-10-11

## 2022-10-11 VITALS
BODY MASS INDEX: 18.35 KG/M2 | SYSTOLIC BLOOD PRESSURE: 109 MMHG | HEART RATE: 79 BPM | DIASTOLIC BLOOD PRESSURE: 82 MMHG | HEIGHT: 62 IN

## 2022-10-11 DIAGNOSIS — M54.50 ACUTE RIGHT-SIDED LOW BACK PAIN WITHOUT SCIATICA: Primary | ICD-10-CM

## 2022-10-11 DIAGNOSIS — M53.3 SACRAL BACK PAIN: ICD-10-CM

## 2022-10-11 DIAGNOSIS — G89.29 CHRONIC LEFT-SIDED LOW BACK PAIN WITH LEFT-SIDED SCIATICA: ICD-10-CM

## 2022-10-11 DIAGNOSIS — M25.551 RIGHT HIP PAIN: ICD-10-CM

## 2022-10-11 DIAGNOSIS — M54.42 CHRONIC LEFT-SIDED LOW BACK PAIN WITH LEFT-SIDED SCIATICA: ICD-10-CM

## 2022-10-11 DIAGNOSIS — M47.812 CERVICAL SPONDYLOSIS WITHOUT MYELOPATHY: ICD-10-CM

## 2022-10-11 LAB — QT INTERVAL: 427 MS

## 2022-10-11 PROCEDURE — 99215 OFFICE O/P EST HI 40 MIN: CPT | Performed by: NURSE PRACTITIONER

## 2022-10-11 RX ORDER — LIDOCAINE 50 MG/G
2 PATCH TOPICAL EVERY 24 HOURS
Qty: 60 PATCH | Refills: 0 | Status: SHIPPED | OUTPATIENT
Start: 2022-10-11 | End: 2022-11-02

## 2022-10-11 NOTE — PATIENT INSTRUCTIONS
-XR Hips and Low Back  -Lidoderm patch apply to the back for pain  -Follow up with PCP and pain management

## 2022-10-11 NOTE — PROGRESS NOTES
"Chief Complaint  Back Pain    Subjective          Fabiana Herbert who is a 87 y.o. year old female who presents to [facility] for evaluation of cervical spine.    The patient complains of pain located in the cervical spine.  Patients states the pain has been present for several years.  The pain came on gradually.  Her pain is primarily in the posterior neck, radiating into the head. She will have headaches. She had an MRI Cervical Spine, demonstrating multilevel spondylosis, most significant at C3/4. She has had no interventions for her neck. She has not had physical therapy.    She is being followed by pain management for low back and SI joint pain. She receives injections in the SI joint.    Her main concern today is low back and sacral pain. She reports falling over the weekend and landing directly on her bottom. Since that time she has had severe pain across her low back and sacrum. It is uncomfortable to sit and she is having to reposition in the the chair frequently. Having pain in her right hip as well.     She was recently in the ED for acute dizziness and weakness and found to have a UTI. She had an MRI Brain as well demonstrating multiple lacunar infarcts with small vessel ischemic changes. She is scheduled to see Neurology at the end of this month.       BMI: Body mass index is 18.35 kg/m².      Review of Systems   Musculoskeletal: Positive for arthralgias, back pain, gait problem, neck pain and neck stiffness.   Neurological: Positive for dizziness, weakness, headache, memory problem and confusion.   All other systems reviewed and are negative.       Objective   Vital Signs:   /82 (BP Location: Left arm, Patient Position: Sitting, Cuff Size: Adult)   Pulse 79   Ht 157.5 cm (62\")   BMI 18.35 kg/m²       Physical Exam  Vitals reviewed.   Constitutional:       Appearance: Normal appearance.   Musculoskeletal:      Right shoulder: No tenderness. Normal range " of motion.      Left shoulder: No tenderness. Normal range of motion.      Cervical back: Tenderness present. Pain with movement present. Decreased range of motion.      Lumbar back: Tenderness present. Negative right straight leg raise test and negative left straight leg raise test.      Right hip: Tenderness (SI Joint) present. Decreased range of motion.      Left hip: No tenderness. Decreased range of motion.   Neurological:      Mental Status: She is alert and oriented to person, place, and time.      Motor: Motor strength is normal.      Deep Tendon Reflexes:      Reflex Scores:       Tricep reflexes are 2+ on the right side and 2+ on the left side.       Bicep reflexes are 2+ on the right side and 2+ on the left side.       Brachioradialis reflexes are 2+ on the right side and 2+ on the left side.       Patellar reflexes are 0 on the right side and 0 on the left side.       Achilles reflexes are 0 on the right side and 0 on the left side.       Neurologic Exam     Mental Status   Oriented to person, place, and time.   Level of consciousness: alert    Motor Exam   Muscle bulk: normal  Overall muscle tone: normal    Strength   Strength 5/5 throughout.     Sensory Exam   Light touch normal.     Gait, Coordination, and Reflexes     Reflexes   Right brachioradialis: 2+  Left brachioradialis: 2+  Right biceps: 2+  Left biceps: 2+  Right triceps: 2+  Left triceps: 2+  Right patellar: 0  Left patellar: 0  Right achilles: 0  Left achilles: 0  Right Dunlap: absent  Left Dunlap: absentPt in wheelchair today        Result Review :       Data reviewed: Radiologic studies MRI Cervical Spine on 6/14/22 at [facility] personally reviewed. Multilevel spondylosis, resulting in varying levels of spinal and neural foraminal narrowing. Grade 1 retrolisthesisi of C3 on C4 resulting moderately severe spinal stenosis. 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 These are the most notable findings.           Assessment and Plan     Diagnoses and all orders for this visit:    1. Acute right-sided low back pain without sciatica (Primary)  -     XR Spine Lumbar 2 or 3 View; Future  -     lidocaine (LIDODERM) 5 %; Place 2 patches on the skin as directed by provider Daily. Remove & Discard patch within 12 hours or as directed by MD  Dispense: 60 patch; Refill: 0    2. Sacral back pain  -     XR Spine Lumbar 2 or 3 View; Future  -     XR Hips Bilateral With or Without Pelvis 3-4 View; Future    3. Right hip pain  -     XR Hips Bilateral With or Without Pelvis 3-4 View; Future    4. Cervical spondylosis without myelopathy    5. Chronic left-sided low back pain with left-sided sciatica  -     lidocaine (LIDODERM) 5 %; Place 2 patches on the skin as directed by provider Daily. Remove & Discard patch within 12 hours or as directed by MD  Dispense: 60 patch; Refill: 0    Pt referred for evaluation of neck pain. I reviewed her MRI Cervical Spine, demonstrating multilevel spondylosis. Could consider physical therapy in the future.     Her main concern today is a fall with subsequent low back, hip, and sacral pain. I am concerned that she may have a sacral insufficiency fracture. Will order XR Hips and Pelvis and lumbar spine. Due to her advanced age and recent UTI I am hesitant to prescribe pain medication. Will order Lidoderm patch to apply to the hip and low back. If pain becomes unmanageable I advised that she be sent to the ED for evaluation. She is scheduled to see pain management tomorrow regarding her SI joint injections.      She will follow up in our office on an as needed basis.     I spent 43 minutes caring for Fabiana on this date of service. This time includes time spent by me in the following activities:preparing for the visit, reviewing tests, obtaining and/or reviewing a separately obtained history, performing a medically appropriate examination and/or evaluation , counseling and educating the patient/family/caregiver, ordering medications,  tests, or procedures, documenting information in the medical record and independently interpreting results and communicating that information with the patient/family/caregiver.    Follow Up   Return if symptoms worsen or fail to improve.  Patient was given instructions and counseling regarding her condition or for health maintenance advice.

## 2022-10-12 ENCOUNTER — TELEPHONE (OUTPATIENT)
Dept: CASE MANAGEMENT | Facility: OTHER | Age: 87
End: 2022-10-12

## 2022-10-12 DIAGNOSIS — I63.9 CEREBROVASCULAR ACCIDENT (CVA), UNSPECIFIED MECHANISM: Primary | ICD-10-CM

## 2022-10-12 NOTE — TELEPHONE ENCOUNTER
Called Agustina at [facility] office; however, she was unavailable. Talked with Natanael to advise him there are no sooner appointments for Dr. Graves and patient is currently scheduled on 11/9/22. Per MING Becerra, the PCP could order CTA head/neck prior to pt's appt with [facility]. Natanael will provide update to Agustina.

## 2022-10-12 NOTE — TELEPHONE ENCOUNTER
Returned call to Agustina at PCP office who confirmed appointment was able to be rescheduled for 10/28/22 at 3:00 pm with Dr. Graves and that she will advise PCP of suggestion of possibly ordering CTA head/neck prior to the appointment.

## 2022-10-12 NOTE — TELEPHONE ENCOUNTER
Hub staff attempted to follow warm transfer process and was unsuccessful     Caller: ONEAL    Relationship to patient: WITH PCP    Best call back number: [phone]    Patient is needing: RETURNING JOHNNA'S CALL

## 2022-10-12 NOTE — TELEPHONE ENCOUNTER
Patient identified as possible CCM/HRCM candidate from ER data. Spoke with daughter Jessica and she doesn't think they need anything at this time. They have some tests they need to get done and then will be trying to get someone to stay with and assist her mother and father more.   Francoise Valentin RN  Ambulatory Case Management    10/12/2022, 15:46 EDT

## 2022-10-13 NOTE — TELEPHONE ENCOUNTER
Daughter notified about CTA ordered, gave central scheduling number to call and scheduling, she has to arrange [employer] transportation. Informed okay to cancel appointment next week, but informed PCP wants her see her after she see's Neurology. She is going to call back to schedule that.

## 2022-10-17 ENCOUNTER — TELEPHONE (OUTPATIENT)
Dept: CASE MANAGEMENT | Facility: OTHER | Age: 87
End: 2022-10-17

## 2022-10-17 ENCOUNTER — HOSPITAL ENCOUNTER (EMERGENCY)
Facility: HOSPITAL | Age: 87
Discharge: HOME | End: 2022-10-17
Attending: EMERGENCY MEDICINE | Admitting: EMERGENCY MEDICINE

## 2022-10-17 ENCOUNTER — APPOINTMENT (OUTPATIENT)
Dept: GENERAL RADIOLOGY | Facility: HOSPITAL | Age: 87
End: 2022-10-17

## 2022-10-17 VITALS
HEART RATE: 87 BPM | OXYGEN SATURATION: 95 % | DIASTOLIC BLOOD PRESSURE: 76 MMHG | HEIGHT: 62 IN | RESPIRATION RATE: 18 BRPM | TEMPERATURE: 97.6 F | SYSTOLIC BLOOD PRESSURE: 114 MMHG | BODY MASS INDEX: 18.35 KG/M2

## 2022-10-17 DIAGNOSIS — S30.0XXA CONTUSION OF SACRUM, INITIAL ENCOUNTER: ICD-10-CM

## 2022-10-17 DIAGNOSIS — S70.02XA CONTUSION OF LEFT HIP, INITIAL ENCOUNTER: ICD-10-CM
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
## 2022-10-17 DIAGNOSIS — S39.012A STRAIN OF LUMBAR REGION, INITIAL ENCOUNTER: ICD-10-CM

## 2022-10-17 PROCEDURE — 99283 EMERGENCY DEPT VISIT LOW MDM: CPT

## 2022-10-17 PROCEDURE — 72220 X-RAY EXAM SACRUM TAILBONE: CPT

## 2022-10-17 PROCEDURE — 25010000002 MORPHINE PER 10 MG: Performed by: EMERGENCY MEDICINE

## 2022-10-17 PROCEDURE — 72170 X-RAY EXAM OF PELVIS: CPT

## 2022-10-17 PROCEDURE — 96372 THER/PROPH/DIAG INJ SC/IM: CPT

## 2022-10-17 PROCEDURE — 72100 X-RAY EXAM L-S SPINE 2/3 VWS: CPT

## 2022-10-17 RX ADMIN — MORPHINE SULFATE 4 MG: 4 INJECTION, SOLUTION INTRAMUSCULAR; INTRAVENOUS at 14:17

## 2022-10-17 NOTE — SIGNIFICANT NOTE
10/17/22 5834   Plan   Plan Comments SW met with patient and family to discuss options for rehab. SW discussed doing a referral for an at home evaluation to [facility]. Pt and family were agreeable for referral to be made. Referral made to [facility] via [facility].   Final Discharge Disposition Code 62 - inpatient rehab facility

## 2022-10-17 NOTE — ED PROVIDER NOTES
Time: 1:49 PM EDT  Arrived by: EMS  Chief Complaint: hip pain and back pain due to fall  History provided by: patient, family (daughter), medical records  History is limited by: N/A    History of Present Illness:  Patient is a 87 y.o. year old female who presents to the emergency department with hip pain and back pain.    Patient arrives to ED via EMS with family (daughter) at bedside, who helps to report clinical history. Patient has a medical history of hyperlipidemia, hypertension, hypothyroidism, pacemaker placement, iron deficient anemia, anxiety, CKD 3b, GERD. Patient has no history of smoking, no history of alcohol use, and no history of drug use.    Patient Primary Care Provider after the initial fall incident for evalution (10/11/2022). Provider order XR studies of her lower back and hips bilaterally to rule out fracture or other injury.    Today (10/17/2022), the patients daughter reports patient is experiencing worsening bilateral hip pain and back pain. Patient reports left hip hurts more than her right. Patient reports the pain radiates to lower back and tailbone region. Patient's daughter reports patient does sit for extended periods of time.          History provided by:  Patient, relative and medical records      Patient Care Team  Primary Care Provider: Kika Stevenson APRN    Past Medical History:     Allergies   Allergen Reactions   • Linzess [Linaclotide] Diarrhea   • Duloxetine Unknown - Low Severity   • Zolpidem Unknown - Low Severity   • Zolpidem Tartrate Other (See Comments)   • Aspirin Unknown - Low Severity   • Ciprofloxacin Diarrhea and Unknown - High Severity   • Duloxetine Hcl GI Intolerance   • Ibandronic Acid Unknown - Low Severity   • Levofloxacin Mental Status Change   • Metronidazole Unknown - Low Severity   • Nitrofurantoin Unknown - Low Severity   • Nortriptyline Hcl Delirium   • Pregabalin Unknown - Low Severity   • Ranitidine Hcl Unknown - Low Severity   • Sulfa  Antibiotics Hives   • Sulfamethoxazole-Trimethoprim Unknown - Low Severity   • Tramadol Unknown - Low Severity     Past Medical History:   Diagnosis Date   • Anemia    • Anxiety    • Arthritis     spine   • Back pain    • Cramps, muscle, general    • Degenerative lumbar disc 05/04/2014    LUMBAR /LS DISC DEGENERATION   • Difficulty in swallowing    • Dizziness    • Dysuria 05/24/2019   • EKG abnormality 08/12/2016   • Fatigue    • Headache    • Hearing loss    • Hyperlipidemia    • Hypertension, essential    • Hypothyroidism    • Imbalance    • Incontinence in female    • Ingrowing toenail    • Insomnia    • Iron deficiency anemia 02/01/2017   • Joint pain    • Leg pain    • Lightheadedness 03/14/2018    The patient reports onset of lightheadedness following heart surgery in 2016. She notes that this tends to be brought on by a change in position. I would consider orthostasis as a potential culprit. I will attempt to pursue orthostatic blood pressures but the patient notes that it is hard for her to lay flaat on the exam table. I will request her records be sent for my review.    • Low back pain 06/09/2014   • Lumbar degenerative disc disease 02/01/2017   • Macular degeneration     Dharmesh   • Mitral valve disorder    • Mitral valve replaced 12/07/2017   • Nocturia 05/24/2019   • Pacemaker 02/23/2018   • Peptic ulcer disease 02/01/2017   • Ringing in ear    • Sciatica 01/09/2015   • Shortness of breath    • Urethral caruncle 05/24/2019   • Vision changes    • Weakness      Past Surgical History:   Procedure Laterality Date   • APPENDECTOMY  1951   • BREAST BIOPSY Left 1955   • CATARACT EXTRACTION WITH INTRAOCULAR LENS IMPLANT Bilateral     1992, 2001   • COLON SURGERY  2018    Dr. Alonso, Moderate Diverticulosis   • COLONOSCOPY     • CYSTOSCOPY  02/18/2020    excision of urethral prolapse w/ Dr. Ibanez   • CYSTOSCOPY  02/18/2020    Excision of Urethral Prolapse w/ Dr. Ibanez   • ENDOSCOPY  2018   •  HEMORRHOIDECTOMY      2009/ 2014 hemorrhoid banding    • HYSTERECTOMY  1960    total    • LUMBAR LAMINECTOMY  07/2007   • MITRAL VALVE REPLACEMENT  11/17/2016    Dr. Singh in [place]   • PACEMAKER IMPLANTATION  11/2016    MRI compatible   • WRIST SURGERY Left      Family History   Problem Relation Age of Onset   • Heart disease Father    • Diabetes Father    • Heart disease Sister    • Diabetes Sister    • Heart disease Other    • Heart disease Other    • Colon cancer Neg Hx        Home Medications:  Prior to Admission medications    Medication Sig Start Date End Date Taking? Authorizing Provider   Bacillus Coagulans-Inulin (Probiotic) 1-250 BILLION-MG capsule Probiotic 15 billion cell oral capsule take 2 capsules by oral route daily   Active    Provider, MD Raulito   estradiol (ESTRACE) 0.1 MG/GM vaginal cream INSERT 1 G INTO THE VAGINA DAILY. 9/27/22   Kika Stevenson APRN   hydrocortisone 2.5 % cream APPLY TO AFFECTED AREA TWICE A DAY AS DIRECTED 6/13/22   Pedrito Rios MD   hydrOXYzine (ATARAX) 25 MG tablet Take 1 tab PO bid prn itching 8/30/22   Kika Stevenson APRN   levothyroxine (SYNTHROID, LEVOTHROID) 50 MCG tablet TAKE 1 TABLET BY MOUTH EVERY DAY 6/13/22   Kika Stevenson APRN   lidocaine (LIDODERM) 5 % Place 2 patches on the skin as directed by provider Daily. Remove & Discard patch within 12 hours or as directed by MD 10/11/22   Starr Meek APRN   LORazepam (ATIVAN) 0.5 MG tablet Take 1 tablet by mouth Daily As Needed for Anxiety. 5/4/22   Kika Stevenson APRN   losartan (COZAAR) 25 MG tablet Take 1 tablet by mouth Daily. 8/30/22   Kika Stevenson APRN   Mirabegron ER (Myrbetriq) 25 MG tablet sustained-release 24 hour 24 hr tablet Take 1 tablet by mouth Daily. Indications: lot [id number] exp 5/23 2 boxes 6/28/22   Kika Stevenson APRN   nebivolol (BYSTOLIC) 10 MG tablet Take 1.5 tablets by mouth Every Night. 4/13/22   Raj  "MD Daniel   ondansetron (ZOFRAN) 4 MG tablet Take 1 tablet by mouth Every 8 (Eight) Hours As Needed for Nausea or Vomiting. 5/4/22   Kika Stevenson APRN   Pfizer-BioNT COVID-19 Vac-Maryan 30 MCG/0.3ML suspension  8/9/22   Raulito Cota MD   polyethylene glycol (MIRALAX) 17 GM/SCOOP powder 17 g.    Raulito Cota MD   Probiotic Product (PROBIOTIC BLEND PO) Probiotic    Provider, MD Raulito   spironolactone (ALDACTONE) 25 MG tablet Take 1 tablet by mouth Every Other Day. 8/29/22   Verónica Saucedo APRN   cephalexin (KEFLEX) 500 MG capsule Take 1 capsule by mouth 3 (Three) Times a Day. 10/4/22 10/17/22  Jonas Mcdaniel MD   erythromycin (ROMYCIN) 5 MG/GM ophthalmic ointment Administer  into the left eye Every Night. 8/30/22 10/17/22  Kika Stevenson APRN   mupirocin (BACTROBAN) 2 % ointment APPLY SMALL AMOUNT TOPICALLY TO THE AFFECTED AREA THREE TIMES DAILY 8/27/21 10/17/22  Kika Stevenson APRN        Social History:   Social History     Tobacco Use   • Smoking status: Never   • Smokeless tobacco: Never   Vaping Use   • Vaping Use: Never used   Substance Use Topics   • Alcohol use: Never   • Drug use: Never     Recent travel: not applicable    Review of Systems:  Review of Systems   Constitutional: Negative for chills and fever.   HENT: Negative for sore throat.    Eyes: Negative for photophobia.   Respiratory: Negative for shortness of breath.    Cardiovascular: Negative for chest pain.   Gastrointestinal: Negative for abdominal pain, diarrhea, nausea and vomiting.   Genitourinary: Negative for dysuria.   Musculoskeletal: Positive for back pain (low). Negative for neck pain.        Positive bilateral hip pain   Skin: Negative for wound.   Neurological: Negative for headaches.   All other systems reviewed and are negative.       Physical Exam:  /76   Pulse 87   Temp 97.6 °F (36.4 °C)   Resp 18   Ht 157.5 cm (62\")   LMP  (LMP Unknown)   SpO2 95%   BMI 18.35 " kg/m²     Physical Exam  Vitals and nursing note reviewed.   Constitutional:       General: She is not in acute distress.  HENT:      Head: Normocephalic and atraumatic.   Eyes:      Extraocular Movements: Extraocular movements intact.   Cardiovascular:      Rate and Rhythm: Normal rate and regular rhythm.   Pulmonary:      Effort: Pulmonary effort is normal. No respiratory distress.      Breath sounds: Normal breath sounds.   Abdominal:      General: Abdomen is flat.      Palpations: Abdomen is soft.      Tenderness: There is no abdominal tenderness.   Musculoskeletal:         General: Normal range of motion.      Cervical back: Normal range of motion and neck supple.   Skin:     General: Skin is warm and dry.      Capillary Refill: Capillary refill takes less than 2 seconds.   Neurological:      Mental Status: She is alert and oriented to person, place, and time. Mental status is at baseline.                Medications in the Emergency Department:  Medications   morphine injection 4 mg (4 mg Intramuscular Given 10/17/22 1417)        Labs  Lab Results (last 24 hours)     ** No results found for the last 24 hours. **           Imaging:  No Radiology Exams Resulted Within Past 24 Hours    Procedures:  Procedures    Progress                            Medical Decision Making:  MDM   Patient presents after a fall.  Patient's x-rays are all negative for acute injury.  Patient's pain was controlled with medication.  Patient stable for discharge.        Final diagnoses:   Fall, initial encounter   Contusion of left hip, initial encounter   Contusion of sacrum, initial encounter   Strain of lumbar region, initial encounter        Disposition:  ED Disposition     ED Disposition   Discharge    Condition   Stable    Comment   --             This medical record created using voice recognition software and a virtual scribe.    Documentation assistance provided by Angela Sow acting as scribe for Dr. Sunny Oliveira DO.  Information recorded by the scribe was done at my direction and has been verified and validated by me.       Angela Sow  10/17/22 1406       Sunny Eugene DO  10/19/22 2610

## 2022-10-18 ENCOUNTER — PATIENT OUTREACH (OUTPATIENT)
Dept: CASE MANAGEMENT | Facility: OTHER | Age: 87
End: 2022-10-18

## 2022-10-18 ENCOUNTER — TELEPHONE (OUTPATIENT)
Dept: CASE MANAGEMENT | Facility: OTHER | Age: 87
End: 2022-10-18

## 2022-10-18 DIAGNOSIS — I63.9 CEREBROVASCULAR ACCIDENT (CVA), UNSPECIFIED MECHANISM: Primary | ICD-10-CM

## 2022-10-18 NOTE — OUTREACH NOTE
AMBULATORY CASE MANAGEMENT NOTE    Name and Relationship of Patient/Support Person: TORY HITCHCOCK - Emergency Contact    Spoke with patients daughter in detail about patient needs.   She was in the ER on 10/4 for weakness, dizzy, they diagnosed her with CVA, UTI and wanted to admit her to the hospital, however she refused to be admitted.   Daughter states she can not hardly move around because she hurts so much.   Got appts tests scheduled, however daughter asking how pt would recover if something was found and would they do about it. Provider called daughter yesterday and advised her to take pt to the ER. She went to the ER yesterday for increased pain, however they sent her home.   She called today wanting to get mother in a nursing home. She only has [employer] Medicare Replacement, but will send referral to local ones.  Daughter notified.  Education Documentation  No documentation found.      SHANNON JUAREZ  Ambulatory Case Management  10/18/2022, 13:51 EDT

## 2022-10-19 ENCOUNTER — TELEPHONE (OUTPATIENT)
Dept: FAMILY MEDICINE CLINIC | Facility: CLINIC | Age: 87
End: 2022-10-19

## 2022-10-19 DIAGNOSIS — M25.559 HIP PAIN: ICD-10-CM

## 2022-10-19 DIAGNOSIS — R53.1 WEAKNESS: ICD-10-CM

## 2022-10-19 DIAGNOSIS — G89.29 CHRONIC LOW BACK PAIN, UNSPECIFIED BACK PAIN LATERALITY, UNSPECIFIED WHETHER SCIATICA PRESENT: ICD-10-CM

## 2022-10-19 DIAGNOSIS — M54.50 CHRONIC LOW BACK PAIN, UNSPECIFIED BACK PAIN LATERALITY, UNSPECIFIED WHETHER SCIATICA PRESENT: ICD-10-CM

## 2022-10-19 DIAGNOSIS — R26.81 GAIT INSTABILITY: ICD-10-CM

## 2022-10-19 DIAGNOSIS — I63.9 CEREBROVASCULAR ACCIDENT (CVA), UNSPECIFIED MECHANISM: Primary | ICD-10-CM

## 2022-10-19 NOTE — TELEPHONE ENCOUNTER
PATIENTS DAUGHTER CALLED AND SHE SPOKE TO [facility] BUT THE INSURANCE CAN NOT APPROVE HER UNTIL A HOME HEALTH AGENCY HAS COME OUT AND OBSERVED HER AND RECOMMENDS THAT SHE NEEDS NURSING CARE DAILY. PLEASE CALL PATIENTS DAUGHTER JOSH FOR MORE INFORMATION ABOUT THIS [phone]

## 2022-10-20 ENCOUNTER — PATIENT OUTREACH (OUTPATIENT)
Dept: CASE MANAGEMENT | Facility: OTHER | Age: 87
End: 2022-10-20

## 2022-10-20 NOTE — OUTREACH NOTE
AMBULATORY CASE MANAGEMENT NOTE    Name and Relationship of Patient/Support Person:  -     Daily ([phone]) from [facility] called and said because the patient has not had a 3 day hospital stay and with her insurance the only way they could admit her would be self pay, which would be $295 a day plus medications. She said the patient needs to be evaluated by home health PT/OT and nursing and if they indicate she would benefit from skilled nursing home therapy then she could start on that authorization. Sent home health referral order to [facility] at [phone] and will have those evaluations faxed to [facility] at [phone] once they are completed.  Daughter updated.  Education Documentation  No documentation found.      SHANNON JUAREZ  Ambulatory Case Management  10/20/2022, 11:07 EDT

## 2022-10-21 ENCOUNTER — TELEPHONE (OUTPATIENT)
Dept: FAMILY MEDICINE CLINIC | Facility: CLINIC | Age: 87
End: 2022-10-21

## 2022-10-21 NOTE — TELEPHONE ENCOUNTER
Caller: TORY HITCHCOCK    Relationship: Emergency Contact    Best call back number: [phone]    What medication are you requesting: PROMOD - POWDER    What are your current symptoms: NOT EATING, NO APPETITE    If a prescription is needed, what is your preferred pharmacy and phone number: [id number] - [place], [place] - [address] [facility] [phone]  - [phone] FX     Additional notes:  TORY REPORTS NURSE FROM [facility] CAME OUT AND SUGGESTED THAT PATIENT TAKE PROMOD TO INCREASE APPETITE

## 2022-10-21 NOTE — TELEPHONE ENCOUNTER
Spoke with Renetta.     Daughter is concerned with the fluid she is retaining since she isn't taking anything to help it like lasix or HCTZ. States that she does urinate appropriately. She reports that the swelling is only her feet. Pt is keeping her feet elevated but doesn't walk much and keeps a bedside commode near so she doesn't have to go far.      Cardio Emy Saucedo is who prescribes the Spironlactone. Advised to call their office as well.     Please advise

## 2022-10-21 NOTE — TELEPHONE ENCOUNTER
Caller: TORY HITCHCOCK    Relationship: Emergency Contact    Best call back number: [phone]    What medication are you requesting: STRONGER MEDICATION THAN  spironolactone (ALDACTONE) 25 MG tablet.     What are your current symptoms: SWELLING IN FEET     How long have you been experiencing symptoms: FOR A WHILE    Have you had these symptoms before:    [x] Yes  [] No    Have you been treated for these symptoms before:   [x] Yes  [] No    If a prescription is needed, what is your preferred pharmacy and phone number: [id number] - [place], [place] - [address] [facility] - [phone] [facility] [phone] FX     Additional notes:  PATIENT'S DAUGHTER STATED THAT THE MEDICATION HAS HELPED BUT SHE IS STILL HAVING SWELLING IN FEET FROM THE FLUID.

## 2022-10-24 ENCOUNTER — TELEPHONE (OUTPATIENT)
Dept: CARDIOLOGY | Facility: CLINIC | Age: 87
End: 2022-10-24

## 2022-10-24 NOTE — TELEPHONE ENCOUNTER
Received VM regarding patient feet swelling.     SW patient daughter. Patient feet have increased swelling, patient is on spirolactone and family is requesting if there is anything stronger we can give patient.     Patient daughter states SOA with exertion with getting up from chair to bedside commode, unsure if it is due to pain or fluid retention. Per daughter patient is chair or bed ridden at this time.     Please advise.

## 2022-10-25 NOTE — TELEPHONE ENCOUNTER
SW patient daughter- It is too difficult for the patient to get around. Patient's family is staying with patient due to frequent falls causing very limited mobilization.  Patient is not able to get to facility to have labs drawn.     Daughter states her feet do go down with elevation. Encouraged family to reach to PCP for Home Health assistance with labs and medication planning.     Patient family verbalized understanding.

## 2022-10-26 ENCOUNTER — TELEPHONE (OUTPATIENT)
Dept: CASE MANAGEMENT | Facility: OTHER | Age: 87
End: 2022-10-26

## 2022-10-26 ENCOUNTER — APPOINTMENT (OUTPATIENT)
Dept: ULTRASOUND IMAGING | Facility: HOSPITAL | Age: 87
End: 2022-10-26

## 2022-10-26 NOTE — TELEPHONE ENCOUNTER
"Daughter calling to follow-up on Home Health referral.   States she hasn't been able to get her mother out to any recent appointments. Spoke with Cardiologist wanting a \"stronger\" water pill for her and they told her she would need to have lab work completed. Advised we could have home health get the labs if/when she gets accepted.  Called [facility] and said the referral was accepted however they had to get approval from manager. They are going to look into this and call me back.  Francoise Valentin RN  Ambulatory Case Management    10/26/2022, 16:23 EDT         "

## 2022-11-01 ENCOUNTER — TELEPHONE (OUTPATIENT)
Dept: CASE MANAGEMENT | Facility: OTHER | Age: 87
End: 2022-11-01

## 2022-11-01 DIAGNOSIS — M54.50 ACUTE RIGHT-SIDED LOW BACK PAIN WITHOUT SCIATICA: ICD-10-CM

## 2022-11-01 DIAGNOSIS — G89.29 CHRONIC LEFT-SIDED LOW BACK PAIN WITH LEFT-SIDED SCIATICA: ICD-10-CM

## 2022-11-01 DIAGNOSIS — I50.32 CHRONIC DIASTOLIC CONGESTIVE HEART FAILURE: Primary | ICD-10-CM

## 2022-11-01 DIAGNOSIS — M54.42 CHRONIC LEFT-SIDED LOW BACK PAIN WITH LEFT-SIDED SCIATICA: ICD-10-CM

## 2022-11-01 NOTE — TELEPHONE ENCOUNTER
Daughter called, she has still not [facility].  Called [place] office, they didn't see the referral in the computer.   [facility] number and she said she sent the message, she wasn't sure why no one has followed up or accepted the referral. She will have someone call me today about this.  Francoise Valentin RN  Ambulatory Case Management    11/1/2022, 09:31 EDT

## 2022-11-01 NOTE — TELEPHONE ENCOUNTER
Luz [phone] with Home Health called back and said they could get nursing in there now, however Physical Therapy is going to be about 1-2 weeks out before they can evaluate her.   Called [facility] and they take the insurance, will fax over the referral order to [phone].  Francoise Valentin RN  Ambulatory Case Management    11/1/2022, 12:54 EDT       Called daughter to let her know about the wait and she said that it was okay to wait for PT to evaluate her. States she talked with mother about a nursing home and she refuses to go. Daughter mentioned maybe the home health nurse could evaluate her and possible be admitted to hospital to see if they can figure out what is wrong with her. Explained even if nurse thinks she needs to be, it is up to the ER provider.   She has cancelled all her appointments, voiced concern because she is going to have to be seen eventually by the providers to continue to care for her. Mentioned hospice, but she isn't interested at this time.  [facility] said they would need a more recent face to face with provider, last one was 9/20.  Left message with Luz at [facility] to let her know they could start services if the face to face was acceptable.    Francoise Valentin RN  Ambulatory Case Management    11/1/2022, 13:21 EDT

## 2022-11-02 ENCOUNTER — TELEPHONE (OUTPATIENT)
Dept: CASE MANAGEMENT | Facility: OTHER | Age: 87
End: 2022-11-02

## 2022-11-02 RX ORDER — LIDOCAINE 50 MG/G
2 PATCH TOPICAL EVERY 24 HOURS
Qty: 60 PATCH | Refills: 0 | Status: SHIPPED | OUTPATIENT
Start: 2022-11-02 | End: 2023-02-07

## 2022-11-02 NOTE — TELEPHONE ENCOUNTER
Daughter called to say she did not hear anything from [facility] yesterday. Called Lu[facility] and she said within the next 48 hrs someone should be calling her.  Notified the daughter.  Francoise Valentin RN  Ambulatory Case Management    11/2/2022, 10:11 EDT

## 2022-11-03 ENCOUNTER — TELEPHONE (OUTPATIENT)
Dept: CASE MANAGEMENT | Facility: OTHER | Age: 87
End: 2022-11-03

## 2022-11-03 ENCOUNTER — PATIENT OUTREACH (OUTPATIENT)
Dept: CASE MANAGEMENT | Facility: OTHER | Age: 87
End: 2022-11-03

## 2022-11-03 ENCOUNTER — APPOINTMENT (OUTPATIENT)
Dept: CT IMAGING | Facility: HOSPITAL | Age: 87
End: 2022-11-03

## 2022-11-03 DIAGNOSIS — R35.0 URINE FREQUENCY: Primary | ICD-10-CM

## 2022-11-03 RX ORDER — HYDROCODONE BITARTRATE AND ACETAMINOPHEN 5; 325 MG/1; MG/1
1 TABLET ORAL EVERY 4 HOURS PRN
COMMUNITY
End: 2022-11-03 | Stop reason: SDUPTHER

## 2022-11-03 RX ORDER — LANOLIN ALCOHOL/MO/W.PET/CERES
1000 CREAM (GRAM) TOPICAL DAILY
COMMUNITY

## 2022-11-03 RX ORDER — CHOLECALCIFEROL (VITAMIN D3) 25 MCG
25 CAPSULE ORAL DAILY
COMMUNITY

## 2022-11-03 RX ORDER — HYDROCODONE BITARTRATE AND ACETAMINOPHEN 5; 325 MG/1; MG/1
TABLET ORAL
COMMUNITY

## 2022-11-03 NOTE — TELEPHONE ENCOUNTER
Daughter called back and said patient was not taking the Myrbetriq, gave samples for the 25 mg dose that was on medication list.  Went over medication list and is not taking:  Zofran  Atarax  Ativan- going to see if she has some to give her  Probiotic  Myrbetriq-going to start, gave samples

## 2022-11-03 NOTE — OUTREACH NOTE
AMBULATORY CASE MANAGEMENT NOTE    Name and Relationship of Patient/Support Person:  -     Patiens daughter called to let me know Home Health nurse, Laney came by yesterday and said patient is having increased back pain, and the medication she gets from pain management isn't helping. Advised to call their office and have them address that. States the provider told her they didn't have the xray's she had done in October. Called [facility], they are faxing us her OV records and I faxed the image reports to [phone].  Said her mother is getting up at night to pee a lot. She isn't complaining of any pain, itching, fever, discomfort, and she is not drinking much before she goes to bed. She has seen Urology previously.  Myrbetriq 25 mg is on her medication list, however the daughter states that doesn't sound familiar and not sure that is something she is taking. She is going to call me later to go over all her medications and will send provider request for UA. Daughter states she gets frequent UTI's and last one was 10/4.    Education Documentation  No documentation found.      SHANNON JUAREZ  Ambulatory Case Management  11/3/2022, 11:17 EDT       Son came by office to  Myrbetriq samples.  Shannon Valentin RN  Ambulatory Case Management    11/3/2022, 12:40 EDT

## 2022-11-03 NOTE — TELEPHONE ENCOUNTER
Patients daughter states Fabiana is getting up at night to pee a lot. States she is not drinking a lot before bed. Doesn't verbalize any urinary symptoms. She was treated last for UTI on 10/4.  Myrbetriq is on her medication list, however daughter is not sure she is taking that consistently.  She is going to call me later to let us know if she has been taking that medication, but also wanted to see if it was okay to send order to home health for UA.  Thanks

## 2022-11-04 DIAGNOSIS — F41.9 ANXIETY: Primary | ICD-10-CM

## 2022-11-04 RX ORDER — PANTOPRAZOLE SODIUM 40 MG/1
TABLET, DELAYED RELEASE ORAL
Qty: 30 TABLET | Refills: 5 | Status: SHIPPED | OUTPATIENT
Start: 2022-11-04

## 2022-11-04 RX ORDER — BUSPIRONE HYDROCHLORIDE 5 MG/1
5 TABLET ORAL 2 TIMES DAILY PRN
Qty: 60 TABLET | Refills: 0 | Status: SHIPPED | OUTPATIENT
Start: 2022-11-04 | End: 2022-12-01

## 2022-11-08 ENCOUNTER — TELEPHONE (OUTPATIENT)
Dept: FAMILY MEDICINE CLINIC | Facility: CLINIC | Age: 87
End: 2022-11-08

## 2022-11-08 NOTE — TELEPHONE ENCOUNTER
Phoned patients daughter and I ask her why her test were cancelled, she stated that she could not get her there because she cannot walk so they cancelled the test, she states she does not know if they can get her on the table to even do the test.

## 2022-11-09 ENCOUNTER — LAB REQUISITION (OUTPATIENT)
Dept: LAB | Facility: HOSPITAL | Age: 87
End: 2022-11-09

## 2022-11-09 ENCOUNTER — TELEPHONE (OUTPATIENT)
Dept: CASE MANAGEMENT | Facility: OTHER | Age: 87
End: 2022-11-09

## 2022-11-09 ENCOUNTER — TELEPHONE (OUTPATIENT)
Dept: FAMILY MEDICINE CLINIC | Facility: CLINIC | Age: 87
End: 2022-11-09

## 2022-11-09 DIAGNOSIS — E86.0 DEHYDRATION: ICD-10-CM

## 2022-11-09 DIAGNOSIS — I10 ESSENTIAL (PRIMARY) HYPERTENSION: ICD-10-CM

## 2022-11-09 DIAGNOSIS — R53.1 WEAKNESS: ICD-10-CM

## 2022-11-09 DIAGNOSIS — R30.0 DYSURIA: ICD-10-CM

## 2022-11-09 DIAGNOSIS — N39.0 URINARY TRACT INFECTION, SITE NOT SPECIFIED: ICD-10-CM

## 2022-11-09 DIAGNOSIS — R53.83 OTHER FATIGUE: ICD-10-CM

## 2022-11-09 LAB
BILIRUB UR QL STRIP: NEGATIVE
CLARITY UR: CLEAR
COLOR UR: YELLOW
GLUCOSE UR STRIP-MCNC: NEGATIVE MG/DL
HGB UR QL STRIP.AUTO: NEGATIVE
KETONES UR QL STRIP: NEGATIVE
LEUKOCYTE ESTERASE UR QL STRIP.AUTO: NEGATIVE
NITRITE UR QL STRIP: NEGATIVE
PH UR STRIP.AUTO: 6 [PH] (ref 5–8)
PROT UR QL STRIP: NEGATIVE
SP GR UR STRIP: 1.01 (ref 1–1.03)
UROBILINOGEN UR QL STRIP: NORMAL

## 2022-11-09 PROCEDURE — 81003 URINALYSIS AUTO W/O SCOPE: CPT | Performed by: NURSE PRACTITIONER

## 2022-11-09 NOTE — TELEPHONE ENCOUNTER
I left a message for her last week about this with a verbal order that Kika authorized, I am not sure why she called again. However, I spoke with her about that and the lab orders that were faxed over but not done yet.  Francoise Valentin RN  Ambulatory Case Management  11/9/2022, 13:33 EST

## 2022-11-09 NOTE — TELEPHONE ENCOUNTER
[facility] wanting verbal order for UA for Ms. Herbert d/t having frequent urination.      I gave verbal

## 2022-11-09 NOTE — TELEPHONE ENCOUNTER
Daughter called and just wanted to verify it was okay to take the Buspar the same time she takes her pain medication. Advised it would be fine, may make her a little sleepier, but daughter states that's okay because she doesn't sleep well.  Said she may start just giving it to her at night, then see how that works, she is afraid if she gives it to her in the morning, then she wont be able to get up and move around, she will sleep all day.  Francoise Valentin RN  Ambulatory Case Management    11/9/2022, 16:40 EST

## 2022-11-14 NOTE — TELEPHONE ENCOUNTER
Daughter called and said her mother is still getting up around 10 or more times at night to void. Most of the time she will think she has too, but when she gets on the BS commode she doesn't go.   The UA on 11/9 was completely normal.  She just started taking Myrbetriq 25 mg again around 11/3, the daughter said she had not been taking this, gave her samples on this date.  States the Buspar 5 mg you gave her on 11/4 is not helping her sleep at night, and was wondering if there was something else you could prescribe to help her sleep.   Francoise Valentin RN  Ambulatory Case Management    11/14/2022, 12:49 EST

## 2022-11-15 ENCOUNTER — PATIENT OUTREACH (OUTPATIENT)
Dept: CASE MANAGEMENT | Facility: OTHER | Age: 87
End: 2022-11-15

## 2022-11-15 ENCOUNTER — TELEPHONE (OUTPATIENT)
Dept: CASE MANAGEMENT | Facility: OTHER | Age: 87
End: 2022-11-15

## 2022-11-15 RX ORDER — TRAZODONE HYDROCHLORIDE 50 MG/1
50 TABLET ORAL NIGHTLY
Qty: 90 TABLET | Refills: 0 | Status: SHIPPED | OUTPATIENT
Start: 2022-11-15 | End: 2023-02-02

## 2022-11-15 NOTE — TELEPHONE ENCOUNTER
Home Health nurse called and said she was there with the patient and having 9/10 severe abd. Pain, her abd. is firm and tender, she has bowel sounds in all 4 quads. VS as follows:- BP-142/86, HR-80, SAo2-93%, Temp.-98.0. States she is slightly nauseous, she had an enema and a suppository today but has not had any results, her last BM was yesterday and it was very small they reported.  Spoke with PCP and she wanted to do a complete abd. Series x-ray. They weren't sure about getting the patient out, said we could do mobile x-ray, but can't give them a time frame of when they will be there and back with the results.  After talked with PCP again, she advised to go to the ER, patients son was there and he agreed, that is what they are going to do.  Francoise Valentin RN  Ambulatory Case Management    11/15/2022, 15:39 EST

## 2022-11-15 NOTE — OUTREACH NOTE
AMBULATORY CASE MANAGEMENT NOTE    Name and Relationship of Patient/Support Person: TORY HITCHCOCK - Emergency Contact    Called daughter and went over Kika's suggestions for the Trazodone. Daughter states her mother is having issues with constipation, nothing new, but seem to be more of a problem now. Talked about increased water, fiber, exercise,and feet elevation. She is already taking stool softener twice a day, drinks prune juice, Miralax daily, and suppositories as needed. Will check with PCP to see if she has any other suggestions.  SDOH updated and reviewed with the patient during this program:  Financial Resource Strain: Low Risk    • Difficulty of Paying Living Expenses: Not hard at all      Social Connections: Moderately Integrated   • Frequency of Communication with Friends and Family: More than three times a week   • Frequency of Social Gatherings with Friends and Family: More than three times a week   • Attends Yazidism Services: 1 to 4 times per year   • Active Member of Clubs or Organizations: No   • Attends Club or Organization Meetings: Never   • Marital Status:       Stress: Stress Concern Present   • Feeling of Stress : Very much     Education Documentation  No documentation found.      SHANNON JUAREZ  Ambulatory Case Management  11/15/2022, 10:24 EST

## 2022-11-15 NOTE — TELEPHONE ENCOUNTER
Daughter agreed to try the Trazodone-pended in the encounter, did you want her to continue the Buspar as well?   Explained about the Myrbetriq, will check back in about 6-7 weeks to see if there has been any improvement.   She also states her mother is having increased trouble with constipation. She takes a stool softener twice a day, Miralax daily, and drinks prune juice and uses suppositories as needed, and daughter says its still an issue.  She has not been eating or drinking a lot.   Advised she needs to increase her fiber, water intake, getting some exercise would help, and also raising her feet while on the toilet may help as well.   Any other suggestions?  Francoise Valentin RN  Ambulatory Case Management    11/15/2022, 10:43 EST

## 2022-11-16 ENCOUNTER — TELEPHONE (OUTPATIENT)
Dept: CASE MANAGEMENT | Facility: OTHER | Age: 87
End: 2022-11-16

## 2022-11-16 NOTE — TELEPHONE ENCOUNTER
Called to check and see how the patient was feeling, I didn't see that she went to the ER yesterday. Renetta said her mother finally had a bowel movement and the pain went away. Going to try and increase her fluids, fiber, possible get up and move more and use a stool when having a BM to see if that helps with the constipation, will check in with them in about 2 weeks.  Francoise Valentin RN  Ambulatory Case Management    11/16/2022, 10:09 EST

## 2022-11-16 NOTE — TELEPHONE ENCOUNTER
Patient's daughter notified and will check in with her in about 2 weeks to see if the suggestions helped any.

## 2022-11-17 ENCOUNTER — TELEPHONE (OUTPATIENT)
Dept: FAMILY MEDICINE CLINIC | Facility: CLINIC | Age: 87
End: 2022-11-17

## 2022-11-17 DIAGNOSIS — R10.9 ABDOMINAL PAIN, UNSPECIFIED ABDOMINAL LOCATION: Primary | ICD-10-CM

## 2022-11-17 NOTE — TELEPHONE ENCOUNTER
Caller: FAVIOLA    Relationship: [facility]    Best call back number: [phone]    What is the best time to reach you: 8-5    Who are you requesting to speak with CLINICAL       What was the call regarding: [facility] STATED PATIENT IS STILL HAVING TROUBLE WITH BOWEL MOVEMENT AND WAS REQUESTING FOR A PORTAL XRAY FOR PATIENT.     PLEASE ADVISE     Do you require a callback: YES

## 2022-11-17 NOTE — TELEPHONE ENCOUNTER
Faxed xray order to [facility] at [phone].   Notified daughter  Verified they received the order.  Francoise Valentin RN  Ambulatory Case Management    11/17/2022, 16:40 EST

## 2022-11-21 ENCOUNTER — TELEPHONE (OUTPATIENT)
Dept: CASE MANAGEMENT | Facility: OTHER | Age: 87
End: 2022-11-21

## 2022-11-21 ENCOUNTER — TELEPHONE (OUTPATIENT)
Dept: FAMILY MEDICINE CLINIC | Facility: CLINIC | Age: 87
End: 2022-11-21

## 2022-11-21 DIAGNOSIS — R91.8 BILATERAL PULMONARY INFILTRATES ON CHEST X-RAY: Primary | ICD-10-CM

## 2022-11-21 RX ORDER — AZITHROMYCIN 250 MG/1
TABLET, FILM COATED ORAL
Qty: 6 TABLET | Refills: 0 | Status: SHIPPED | OUTPATIENT
Start: 2022-11-21

## 2022-11-21 NOTE — TELEPHONE ENCOUNTER
----- Message from JACKLYN Cedillo sent at 11/21/2022 12:00 PM EST -----  She does have bilateral infiltrates on xray-she has multiple allergies-I do not see an allergy to zpack-please send in. Also her his xray just shows OA and osteopenia-recommend referral to ortho

## 2022-11-24 DIAGNOSIS — L29.9 PRURITUS: ICD-10-CM

## 2022-11-29 ENCOUNTER — PATIENT OUTREACH (OUTPATIENT)
Dept: CASE MANAGEMENT | Facility: OTHER | Age: 87
End: 2022-11-29

## 2022-11-29 DIAGNOSIS — Z23 NEED FOR INFLUENZA VACCINATION: Primary | ICD-10-CM

## 2022-11-29 RX ORDER — INFLUENZA A VIRUS A/MICHIGAN/45/2015 X-275 (H1N1) ANTIGEN (FORMALDEHYDE INACTIVATED), INFLUENZA A VIRUS A/SINGAPORE/INFIMH-16-0019/2016 IVR-186 (H3N2) ANTIGEN (FORMALDEHYDE INACTIVATED), INFLUENZA B VIRUS B/PHUKET/3073/2013 ANTIGEN (FORMALDEHYDE INACTIVATED), AND INFLUENZA B VIRUS B/MARYLAND/15/2016 BX-69A ANTIGEN (FORMALDEHYDE INACTIVATED) 60; 60; 60; 60 UG/.7ML; UG/.7ML; UG/.7ML; UG/.7ML
0.7 INJECTION, SUSPENSION INTRAMUSCULAR ONCE
Qty: 0.7 ML | Refills: 0 | Status: SHIPPED | OUTPATIENT
Start: 2022-11-29 | End: 2022-11-29

## 2022-11-29 NOTE — OUTREACH NOTE
AMBULATORY CASE MANAGEMENT NOTE    Name and Relationship of Patient/Support Person: TORY HITCHCOCK - Emergency Contact    Patients daughter called and was wanting to see if PCP could send prescription for Flu vaccine to pharmacy for her to  and have home health administer. I called her pharmacy and [facility] said they couldn't dispense it for home health to give.   I called [facility], [place] on [address], and Caridad the person that goes to 's houses and gives the Flu vaccine wasn't in, they are going to have her call me back, however she thinks they have to be a customer of theirs, which she is not.  I called [facility] and they will allow PCP to send script over for home health nurse to give. Checked with Laney home health nurse to make sure she could administer and verified she could.   Education Documentation  No documentation found.      FRANCOISE JUAREZ  Ambulatory Case Management  11/29/2022, 09:49 EST       Daughter made aware of where to pick flu vaccine script up.  Francoise Valentin RN  Ambulatory Case Management    11/29/2022, 17:45 EST

## 2022-11-30 ENCOUNTER — TELEPHONE (OUTPATIENT)
Dept: CASE MANAGEMENT | Facility: OTHER | Age: 87
End: 2022-11-30

## 2022-11-30 DIAGNOSIS — Z23 NEED FOR INFLUENZA VACCINATION: Primary | ICD-10-CM

## 2022-11-30 DIAGNOSIS — Z23 NEED FOR INFLUENZA VACCINATION: ICD-10-CM

## 2022-11-30 RX ORDER — A/SINGAPORE/GP1908/2015 IVR-180 (AN A/MICHIGAN/45/2015 (H1N1)PDM09-LIKE VIRUS, A/HONG KONG/4801/2014, NYMC X-263B (H3N2) (AN A/HONG KONG/4801/2014-LIKE VIRUS), AND B/BRISBANE/60/2008, WILD TYPE (A B/BRISBANE/60/2008-LIKE VIRUS) 15; 15; 15 UG/.5ML; UG/.5ML; UG/.5ML
0.5 INJECTION, SUSPENSION INTRAMUSCULAR ONCE
Qty: 0.5 ML | Refills: 0 | Status: SHIPPED | OUTPATIENT
Start: 2022-11-30 | End: 2022-11-30 | Stop reason: SDUPTHER

## 2022-11-30 RX ORDER — HYDROXYZINE HYDROCHLORIDE 25 MG/1
TABLET, FILM COATED ORAL
Qty: 180 TABLET | OUTPATIENT
Start: 2022-11-30

## 2022-11-30 RX ORDER — A/SINGAPORE/GP1908/2015 IVR-180 (AN A/MICHIGAN/45/2015 (H1N1)PDM09-LIKE VIRUS, A/HONG KONG/4801/2014, NYMC X-263B (H3N2) (AN A/HONG KONG/4801/2014-LIKE VIRUS), AND B/BRISBANE/60/2008, WILD TYPE (A B/BRISBANE/60/2008-LIKE VIRUS) 15; 15; 15 UG/.5ML; UG/.5ML; UG/.5ML
0.5 INJECTION, SUSPENSION INTRAMUSCULAR ONCE
Qty: 0.5 ML | Refills: 0 | Status: SHIPPED | OUTPATIENT
Start: 2022-11-30 | End: 2022-11-30

## 2022-12-01 DIAGNOSIS — F41.9 ANXIETY: Primary | ICD-10-CM

## 2022-12-01 RX ORDER — BUSPIRONE HYDROCHLORIDE 7.5 MG/1
7.5 TABLET ORAL 2 TIMES DAILY
Qty: 60 TABLET | Refills: 0 | Status: SHIPPED | OUTPATIENT
Start: 2022-12-01

## 2022-12-02 ENCOUNTER — TELEPHONE (OUTPATIENT)
Dept: FAMILY MEDICINE CLINIC | Facility: CLINIC | Age: 87
End: 2022-12-02

## 2022-12-02 NOTE — TELEPHONE ENCOUNTER
Caller: [facility]    Relationship: Other    Best call back number: [phone]    What is the best time to reach you: ANYTIME    Who are you requesting to speak with (clinical staff, provider,  specific staff member): CLINICAL   What was the call regarding:     FAVIOLA CALLED SAID THE PATIENT'S DAUGHTER ADVISED  THAT THE PATIENT HAS NOT BEEN ABLE TO SLEEP. SHE SAID THE TRAZODONE IS NOT HELPING HER. SHE SAID SHE ADVISED HER TO NOT TAKE ANY OVER THE COUNTER MEDICATIONS UNTIL THE HEARS BACK FROM PCP CHELSIE    SHE IS REQUESTING A CALL BACK ASAP         Do you require a callback:  YES

## 2022-12-05 NOTE — TELEPHONE ENCOUNTER
I sent an intake form to [facility]- use to be [facility] with her information. The agency said they were taking new patients in our area, but they would have to run the insurance to see if its approved then call them to make appointment if they were.   I will call later in the week to check and see what they decided and let you know.  *I also sent Intake Referral Form for her  as well. **

## 2022-12-07 ENCOUNTER — LAB REQUISITION (OUTPATIENT)
Dept: LAB | Facility: HOSPITAL | Age: 87
End: 2022-12-07

## 2022-12-07 ENCOUNTER — TELEPHONE (OUTPATIENT)
Dept: UROLOGY | Facility: CLINIC | Age: 87
End: 2022-12-07

## 2022-12-07 DIAGNOSIS — D64.9 ANEMIA, UNSPECIFIED: ICD-10-CM

## 2022-12-07 DIAGNOSIS — R53.1 WEAKNESS: ICD-10-CM

## 2022-12-07 DIAGNOSIS — I50.9 HEART FAILURE, UNSPECIFIED: ICD-10-CM

## 2022-12-07 DIAGNOSIS — R53.83 OTHER FATIGUE: ICD-10-CM

## 2022-12-07 DIAGNOSIS — N18.9 CHRONIC KIDNEY DISEASE, UNSPECIFIED: ICD-10-CM

## 2022-12-07 DIAGNOSIS — I10 ESSENTIAL (PRIMARY) HYPERTENSION: ICD-10-CM

## 2022-12-07 LAB
ANION GAP SERPL CALCULATED.3IONS-SCNC: 8.2 MMOL/L (ref 5–15)
BUN SERPL-MCNC: 22 MG/DL (ref 8–23)
BUN/CREAT SERPL: 22 (ref 7–25)
CALCIUM SPEC-SCNC: 9.4 MG/DL (ref 8.6–10.5)
CHLORIDE SERPL-SCNC: 105 MMOL/L (ref 98–107)
CO2 SERPL-SCNC: 26.8 MMOL/L (ref 22–29)
CREAT SERPL-MCNC: 1 MG/DL (ref 0.57–1)
EGFRCR SERPLBLD CKD-EPI 2021: 54.3 ML/MIN/1.73
GLUCOSE SERPL-MCNC: 104 MG/DL (ref 65–99)
NT-PROBNP SERPL-MCNC: 475.4 PG/ML (ref 0–1800)
POTASSIUM SERPL-SCNC: 4.2 MMOL/L (ref 3.5–5.2)
SODIUM SERPL-SCNC: 140 MMOL/L (ref 136–145)
WHOLE BLOOD HOLD SPECIMEN: NORMAL

## 2022-12-07 PROCEDURE — 80048 BASIC METABOLIC PNL TOTAL CA: CPT | Performed by: NURSE PRACTITIONER

## 2022-12-07 PROCEDURE — 83880 ASSAY OF NATRIURETIC PEPTIDE: CPT | Performed by: NURSE PRACTITIONER

## 2022-12-07 NOTE — TELEPHONE ENCOUNTER
"SPOKE TO PT'S DAUGHTER \"TORY\" TO TRY TO R/S CX APPT WITH DR. ZUNIGA FROM 12/5 AND SHE STATED SHE WASN'T GOING TO R/S AT THIS TIME AS PT IS PRETTY MUCH BEDRIDDEN/ANYTHING ELSE TO DO?  "

## 2022-12-07 NOTE — TELEPHONE ENCOUNTER
[facility] called and they accepted this patient and her  that is also your patient. :-)

## 2022-12-08 ENCOUNTER — TELEPHONE (OUTPATIENT)
Dept: CARDIOLOGY | Facility: CLINIC | Age: 87
End: 2022-12-08

## 2022-12-08 NOTE — TELEPHONE ENCOUNTER
----- Message from JACKLYN Biggs sent at 12/8/2022 11:10 AM EST -----  Notify pt BNP level is in normal range, renal function is in normal range, sodium and potassium are in normal range, continue current meds

## 2022-12-13 ENCOUNTER — LAB REQUISITION (OUTPATIENT)
Dept: LAB | Facility: HOSPITAL | Age: 87
End: 2022-12-13

## 2022-12-13 DIAGNOSIS — M25.50 PAIN IN UNSPECIFIED JOINT: ICD-10-CM

## 2022-12-13 DIAGNOSIS — M19.90 UNSPECIFIED OSTEOARTHRITIS, UNSPECIFIED SITE: ICD-10-CM

## 2022-12-13 DIAGNOSIS — I10 ESSENTIAL (PRIMARY) HYPERTENSION: ICD-10-CM

## 2022-12-13 DIAGNOSIS — E86.0 DEHYDRATION: ICD-10-CM

## 2022-12-13 DIAGNOSIS — R53.83 OTHER FATIGUE: ICD-10-CM

## 2022-12-13 DIAGNOSIS — R53.1 WEAKNESS: ICD-10-CM

## 2022-12-13 LAB
AMPHET+METHAMPHET UR QL: NEGATIVE
BARBITURATES UR QL SCN: NEGATIVE
BENZODIAZ UR QL SCN: NEGATIVE
CANNABINOIDS SERPL QL: NEGATIVE
COCAINE UR QL: NEGATIVE
METHADONE UR QL SCN: NEGATIVE
OPIATES UR QL: NEGATIVE
OXYCODONE UR QL SCN: NEGATIVE

## 2022-12-13 PROCEDURE — 80307 DRUG TEST PRSMV CHEM ANLYZR: CPT | Performed by: PHYSICAL MEDICINE & REHABILITATION

## 2022-12-30 NOTE — TELEPHONE ENCOUNTER
Caller: [facility]    Relationship to patient:     Best call back number: [phone]    Patient is needing:KANDACE STATES THAT THE PATIENTS INSURANCE IS HAS DENIED OT GOING FORWARDS AND SHE IS BEING DISCHARGED AS OF TODAY.

## 2023-01-01 ENCOUNTER — APPOINTMENT (OUTPATIENT)
Dept: CT IMAGING | Facility: HOSPITAL | Age: 88
End: 2023-01-01
Payer: MEDICARE

## 2023-01-01 ENCOUNTER — HOSPITAL ENCOUNTER (EMERGENCY)
Facility: HOSPITAL | Age: 88
Discharge: HOME | End: 2023-08-07
Attending: EMERGENCY MEDICINE | Admitting: EMERGENCY MEDICINE
Payer: MEDICARE

## 2023-01-01 ENCOUNTER — TELEPHONE (OUTPATIENT)
Dept: NEUROSURGERY | Facility: CLINIC | Age: 88
End: 2023-01-01

## 2023-01-01 ENCOUNTER — APPOINTMENT (OUTPATIENT)
Dept: GENERAL RADIOLOGY | Facility: HOSPITAL | Age: 88
End: 2023-01-01
Payer: MEDICARE

## 2023-01-01 VITALS
HEIGHT: 62 IN | RESPIRATION RATE: 16 BRPM | SYSTOLIC BLOOD PRESSURE: 121 MMHG | DIASTOLIC BLOOD PRESSURE: 65 MMHG | WEIGHT: 101.41 LBS | TEMPERATURE: 98.5 F | OXYGEN SATURATION: 97 % | BODY MASS INDEX: 18.66 KG/M2 | HEART RATE: 73 BPM

## 2023-01-01 DIAGNOSIS — Z51.5 END OF LIFE CARE: ICD-10-CM

## 2023-01-01 DIAGNOSIS — K63.1 BOWEL PERFORATION: Primary | ICD-10-CM

## 2023-01-01 DIAGNOSIS — N17.9 ACUTE RENAL FAILURE, UNSPECIFIED ACUTE RENAL FAILURE TYPE: ICD-10-CM

## 2023-01-01 LAB
ALBUMIN SERPL-MCNC: 2.8 G/DL (ref 3.5–5.2)
ALBUMIN/GLOB SERPL: 0.6 G/DL
ALP SERPL-CCNC: 135 U/L (ref 39–117)
ALT SERPL W P-5'-P-CCNC: 11 U/L (ref 1–33)
ANION GAP SERPL CALCULATED.3IONS-SCNC: 18.1 MMOL/L (ref 5–15)
AST SERPL-CCNC: 13 U/L (ref 1–32)
BACTERIA SPEC AEROBE CULT: NO GROWTH
BACTERIA UR QL AUTO: ABNORMAL /HPF
BASOPHILS # BLD AUTO: 0.09 10*3/MM3 (ref 0–0.2)
BASOPHILS NFR BLD AUTO: 0.4 % (ref 0–1.5)
BILIRUB SERPL-MCNC: 0.5 MG/DL (ref 0–1.2)
BILIRUB UR QL STRIP: NEGATIVE
BUN SERPL-MCNC: 80 MG/DL (ref 8–23)
BUN/CREAT SERPL: 27.5 (ref 7–25)
CALCIUM SPEC-SCNC: 10.3 MG/DL (ref 8.6–10.5)
CHLORIDE SERPL-SCNC: 106 MMOL/L (ref 98–107)
CLARITY UR: ABNORMAL
CO2 SERPL-SCNC: 19.9 MMOL/L (ref 22–29)
COLOR UR: YELLOW
CREAT SERPL-MCNC: 2.91 MG/DL (ref 0.57–1)
D-LACTATE SERPL-SCNC: 1.7 MMOL/L (ref 0.5–2)
DEPRECATED RDW RBC AUTO: 53.9 FL (ref 37–54)
EGFRCR SERPLBLD CKD-EPI 2021: 15.1 ML/MIN/1.73
EOSINOPHIL # BLD AUTO: 0.02 10*3/MM3 (ref 0–0.4)
EOSINOPHIL NFR BLD AUTO: 0.1 % (ref 0.3–6.2)
ERYTHROCYTE [DISTWIDTH] IN BLOOD BY AUTOMATED COUNT: 13.1 % (ref 12.3–15.4)
FLUAV AG NPH QL: NEGATIVE
FLUBV AG NPH QL IA: NEGATIVE
GEN 5 2HR TROPONIN T REFLEX: 115 NG/L
GLOBULIN UR ELPH-MCNC: 4.8 GM/DL
GLUCOSE BLDC GLUCOMTR-MCNC: 122 MG/DL (ref 70–99)
GLUCOSE SERPL-MCNC: 130 MG/DL (ref 65–99)
GLUCOSE UR STRIP-MCNC: NEGATIVE MG/DL
HCT VFR BLD AUTO: 44.1 % (ref 34–46.6)
HGB BLD-MCNC: 14 G/DL (ref 12–15.9)
HGB UR QL STRIP.AUTO: ABNORMAL
HOLD SPECIMEN: NORMAL
HOLD SPECIMEN: NORMAL
HYALINE CASTS UR QL AUTO: ABNORMAL /LPF
IMM GRANULOCYTES # BLD AUTO: 0.2 10*3/MM3 (ref 0–0.05)
IMM GRANULOCYTES NFR BLD AUTO: 0.8 % (ref 0–0.5)
KETONES UR QL STRIP: NEGATIVE
LEUKOCYTE ESTERASE UR QL STRIP.AUTO: ABNORMAL
LYMPHOCYTES # BLD AUTO: 1.07 10*3/MM3 (ref 0.7–3.1)
LYMPHOCYTES NFR BLD AUTO: 4.5 % (ref 19.6–45.3)
MACROCYTES BLD QL SMEAR: NORMAL
MAGNESIUM SERPL-MCNC: 2.1 MG/DL (ref 1.6–2.4)
MCH RBC QN AUTO: 35.1 PG (ref 26.6–33)
MCHC RBC AUTO-ENTMCNC: 31.7 G/DL (ref 31.5–35.7)
MCV RBC AUTO: 110.5 FL (ref 79–97)
MONOCYTES # BLD AUTO: 0.68 10*3/MM3 (ref 0.1–0.9)
MONOCYTES NFR BLD AUTO: 2.9 % (ref 5–12)
NEUTROPHILS NFR BLD AUTO: 21.5 10*3/MM3 (ref 1.7–7)
NEUTROPHILS NFR BLD AUTO: 91.3 % (ref 42.7–76)
NITRITE UR QL STRIP: NEGATIVE
NRBC BLD AUTO-RTO: 0 /100 WBC (ref 0–0.2)
NT-PROBNP SERPL-MCNC: 7301 PG/ML (ref 0–1800)
PH UR STRIP.AUTO: 6 [PH] (ref 5–8)
PLATELET # BLD AUTO: 280 10*3/MM3 (ref 140–450)
PMV BLD AUTO: 10.7 FL (ref 6–12)
POTASSIUM SERPL-SCNC: 4.8 MMOL/L (ref 3.5–5.2)
PROT SERPL-MCNC: 7.6 G/DL (ref 6–8.5)
PROT UR QL STRIP: ABNORMAL
QT INTERVAL: 452 MS
RBC # BLD AUTO: 3.99 10*6/MM3 (ref 3.77–5.28)
RBC # UR STRIP: ABNORMAL /HPF
REF LAB TEST METHOD: ABNORMAL
SARS-COV-2 RNA RESP QL NAA+PROBE: NOT DETECTED
SMALL PLATELETS BLD QL SMEAR: ADEQUATE
SODIUM SERPL-SCNC: 144 MMOL/L (ref 136–145)
SP GR UR STRIP: 1.02 (ref 1–1.03)
SQUAMOUS #/AREA URNS HPF: ABNORMAL /HPF
TOXIC GRANULATION: NORMAL
TROPONIN T DELTA: -8 NG/L
TROPONIN T SERPL HS-MCNC: 123 NG/L
UROBILINOGEN UR QL STRIP: ABNORMAL
WBC # UR STRIP: ABNORMAL /HPF
WBC NRBC COR # BLD: 23.56 10*3/MM3 (ref 3.4–10.8)
WHOLE BLOOD HOLD COAG: NORMAL
WHOLE BLOOD HOLD SPECIMEN: NORMAL

## 2023-01-01 PROCEDURE — 87040 BLOOD CULTURE FOR BACTERIA: CPT

## 2023-01-01 PROCEDURE — 71045 X-RAY EXAM CHEST 1 VIEW: CPT

## 2023-01-01 PROCEDURE — 80053 COMPREHEN METABOLIC PANEL: CPT

## 2023-01-01 PROCEDURE — 81001 URINALYSIS AUTO W/SCOPE: CPT

## 2023-01-01 PROCEDURE — 93005 ELECTROCARDIOGRAM TRACING: CPT

## 2023-01-01 PROCEDURE — 82948 REAGENT STRIP/BLOOD GLUCOSE: CPT

## 2023-01-01 PROCEDURE — 96361 HYDRATE IV INFUSION ADD-ON: CPT

## 2023-01-01 PROCEDURE — 99284 EMERGENCY DEPT VISIT MOD MDM: CPT

## 2023-01-01 PROCEDURE — 96365 THER/PROPH/DIAG IV INF INIT: CPT

## 2023-01-01 PROCEDURE — 87804 INFLUENZA ASSAY W/OPTIC: CPT | Performed by: PHYSICIAN ASSISTANT

## 2023-01-01 PROCEDURE — 83735 ASSAY OF MAGNESIUM: CPT

## 2023-01-01 PROCEDURE — 85007 BL SMEAR W/DIFF WBC COUNT: CPT

## 2023-01-01 PROCEDURE — 84484 ASSAY OF TROPONIN QUANT: CPT | Performed by: EMERGENCY MEDICINE

## 2023-01-01 PROCEDURE — 85025 COMPLETE CBC W/AUTO DIFF WBC: CPT

## 2023-01-01 PROCEDURE — 87086 URINE CULTURE/COLONY COUNT: CPT

## 2023-01-01 PROCEDURE — 84484 ASSAY OF TROPONIN QUANT: CPT

## 2023-01-01 PROCEDURE — 93005 ELECTROCARDIOGRAM TRACING: CPT | Performed by: EMERGENCY MEDICINE

## 2023-01-01 PROCEDURE — 70450 CT HEAD/BRAIN W/O DYE: CPT

## 2023-01-01 PROCEDURE — 36415 COLL VENOUS BLD VENIPUNCTURE: CPT

## 2023-01-01 PROCEDURE — 74176 CT ABD & PELVIS W/O CONTRAST: CPT

## 2023-01-01 PROCEDURE — 83880 ASSAY OF NATRIURETIC PEPTIDE: CPT | Performed by: EMERGENCY MEDICINE

## 2023-01-01 PROCEDURE — 83605 ASSAY OF LACTIC ACID: CPT

## 2023-01-01 PROCEDURE — 99284 EMERGENCY DEPT VISIT MOD MDM: CPT | Performed by: SURGERY

## 2023-01-01 PROCEDURE — 87635 SARS-COV-2 COVID-19 AMP PRB: CPT | Performed by: PHYSICIAN ASSISTANT

## 2023-01-01 PROCEDURE — 25010000002 CEFTRIAXONE PER 250 MG: Performed by: PHYSICIAN ASSISTANT

## 2023-01-01 RX ORDER — DOXYCYCLINE HYCLATE 100 MG/1
1 CAPSULE ORAL 2 TIMES DAILY
COMMUNITY
Start: 2023-01-01

## 2023-01-01 RX ORDER — TRAZODONE HYDROCHLORIDE 50 MG/1
75 TABLET ORAL NIGHTLY
COMMUNITY

## 2023-01-01 RX ORDER — FUROSEMIDE 20 MG/1
1 TABLET ORAL DAILY
COMMUNITY
Start: 2023-01-01

## 2023-01-01 RX ORDER — DEXAMETHASONE 1 MG
1 TABLET ORAL
COMMUNITY

## 2023-01-01 RX ORDER — ONDANSETRON 4 MG/1
4 TABLET, ORALLY DISINTEGRATING ORAL EVERY 6 HOURS PRN
Qty: 15 TABLET | Refills: 0 | Status: SHIPPED | OUTPATIENT
Start: 2023-01-01

## 2023-01-01 RX ORDER — OXYCODONE HYDROCHLORIDE 10 MG/1
10-15 TABLET ORAL EVERY 4 HOURS PRN
COMMUNITY
Start: 2023-01-01

## 2023-01-01 RX ORDER — DOXYCYCLINE HYCLATE 100 MG
100 TABLET ORAL 2 TIMES DAILY
COMMUNITY
End: 2023-01-01

## 2023-01-01 RX ORDER — OXYCODONE HCL 10 MG/1
10 TABLET, FILM COATED, EXTENDED RELEASE ORAL EVERY 12 HOURS
COMMUNITY
End: 2023-01-01

## 2023-01-01 RX ORDER — BUSPIRONE HYDROCHLORIDE 15 MG/1
15 TABLET ORAL 2 TIMES DAILY
COMMUNITY
Start: 2023-01-01

## 2023-01-01 RX ORDER — SODIUM CHLORIDE 9 MG/ML
100 INJECTION, SOLUTION INTRAVENOUS CONTINUOUS
Status: DISCONTINUED | OUTPATIENT
Start: 2023-01-01 | End: 2023-01-01 | Stop reason: HOSPADM

## 2023-01-01 RX ORDER — MORPHINE SULFATE 10 MG/.5ML
10 SOLUTION ORAL
Qty: 180 ML | Refills: 0 | Status: SHIPPED | OUTPATIENT
Start: 2023-01-01 | End: 2023-01-01 | Stop reason: SDUPTHER

## 2023-01-01 RX ORDER — CEFTRIAXONE SODIUM 1 G/50ML
1000 INJECTION, SOLUTION INTRAVENOUS ONCE
Status: COMPLETED | OUTPATIENT
Start: 2023-01-01 | End: 2023-01-01

## 2023-01-01 RX ORDER — DOCUSATE SODIUM -SENNOSIDES 50; 8.6 MG/1; MG/1
1-2 TABLET, COATED ORAL NIGHTLY PRN
COMMUNITY
Start: 2023-01-01

## 2023-01-01 RX ORDER — SODIUM CHLORIDE 0.9 % (FLUSH) 0.9 %
10 SYRINGE (ML) INJECTION AS NEEDED
Status: DISCONTINUED | OUTPATIENT
Start: 2023-01-01 | End: 2023-01-01 | Stop reason: HOSPADM

## 2023-01-01 RX ORDER — MORPHINE SULFATE 10 MG/.5ML
10 SOLUTION ORAL
Qty: 180 ML | Refills: 0 | Status: SHIPPED | OUTPATIENT
Start: 2023-01-01

## 2023-01-01 RX ADMIN — CEFTRIAXONE SODIUM 1000 MG: 1 INJECTION, SOLUTION INTRAVENOUS at 13:44

## 2023-01-01 RX ADMIN — SODIUM CHLORIDE 100 ML/HR: 9 INJECTION, SOLUTION INTRAVENOUS at 14:30

## 2023-01-01 RX ADMIN — SODIUM CHLORIDE 100 ML/HR: 9 INJECTION, SOLUTION INTRAVENOUS at 14:31

## 2023-01-01 RX ADMIN — SODIUM CHLORIDE 1000 ML: 9 INJECTION, SOLUTION INTRAVENOUS at 13:44

## 2023-01-23 NOTE — TELEPHONE ENCOUNTER
Spoke to Renetta, I am not sure who sent the letter, but patient is home bound. She has home health care and will not be back to our office for further care due to her overall health.

## 2023-01-23 NOTE — TELEPHONE ENCOUNTER
Caller: TORY HITCHCOCK    Relationship to patient: Emergency Contact    Best call back number: [phone]    Patient is needing: PATIENTS DAUGHTER CALLED, STATES THEY RECEIVED LETTER FROM [place]. MOTHER IS IN BAD SHAPE AND IS UNABLE TO GET OUT TO COMPLETE MORE XRS. PATIENT HAD XRS COMPLETED IN NOVEMBER FROM [facility]. PLEASE CALL PATIENTS DAUGHTER TO ADVISE.

## 2023-01-28 DIAGNOSIS — E03.9 HYPOTHYROIDISM, UNSPECIFIED TYPE: ICD-10-CM

## 2023-01-30 RX ORDER — NEBIVOLOL 10 MG/1
15 TABLET ORAL NIGHTLY
Qty: 135 TABLET | Refills: 2 | Status: SHIPPED | OUTPATIENT
Start: 2023-01-30

## 2023-01-30 RX ORDER — LEVOTHYROXINE SODIUM 0.05 MG/1
TABLET ORAL
Qty: 90 TABLET | Refills: 1 | Status: SHIPPED | OUTPATIENT
Start: 2023-01-30

## 2023-02-02 RX ORDER — TRAZODONE HYDROCHLORIDE 50 MG/1
TABLET ORAL
Qty: 90 TABLET | Refills: 0 | Status: SHIPPED | OUTPATIENT
Start: 2023-02-02

## 2023-02-02 NOTE — TELEPHONE ENCOUNTER
Caller: [facility]    Relationship: Home Health    Best call back number: [phone]    Requested Prescriptions:   Requested Prescriptions     Pending Prescriptions Disp Refills   • traZODone (DESYREL) 50 MG tablet [Pharmacy Med Name: TRAZODONE 50 MG TABLET] 90 tablet 0     Sig: TAKE 1 TABLET BY MOUTH EVERY DAY AT NIGHT        Pharmacy where request should be sent: [facility]ACY #[id number] - [place], [place] - [address] [facility] [phone] [facility] [phone] FX     Additional details provided by patient: PATIENT CURRENTLY HAS A WEEK LEFT.     Does the patient have less than a 3 day supply:  [] Yes  [x] No    Would you like a call back once the refill request has been completed: [] Yes [x] No    If the office needs to give you a call back, can they leave a voicemail: [] Yes [x] No    Ivelisse Smiley Rep   02/02/23 14:46 EST

## 2023-02-03 NOTE — TELEPHONE ENCOUNTER
Phoned patients daughter and she advised that she has a provider coming to her home to see her Rebeka VILLASENOR.

## 2023-02-05 DIAGNOSIS — M54.42 CHRONIC LEFT-SIDED LOW BACK PAIN WITH LEFT-SIDED SCIATICA: ICD-10-CM

## 2023-02-05 DIAGNOSIS — G89.29 CHRONIC LEFT-SIDED LOW BACK PAIN WITH LEFT-SIDED SCIATICA: ICD-10-CM

## 2023-02-05 DIAGNOSIS — M54.50 ACUTE RIGHT-SIDED LOW BACK PAIN WITHOUT SCIATICA: ICD-10-CM

## 2023-02-07 RX ORDER — LIDOCAINE 50 MG/G
2 PATCH TOPICAL EVERY 24 HOURS
Qty: 60 PATCH | Refills: 0 | Status: SHIPPED | OUTPATIENT
Start: 2023-02-07

## 2023-02-08 DIAGNOSIS — F41.9 ANXIETY: ICD-10-CM

## 2023-02-08 RX ORDER — BUSPIRONE HYDROCHLORIDE 5 MG/1
5 TABLET ORAL 2 TIMES DAILY PRN
Qty: 60 TABLET | Refills: 0 | OUTPATIENT
Start: 2023-02-08

## 2023-04-05 DIAGNOSIS — G89.29 CHRONIC LEFT-SIDED LOW BACK PAIN WITH LEFT-SIDED SCIATICA: ICD-10-CM

## 2023-04-05 DIAGNOSIS — M54.50 ACUTE RIGHT-SIDED LOW BACK PAIN WITHOUT SCIATICA: ICD-10-CM

## 2023-04-05 DIAGNOSIS — M54.42 CHRONIC LEFT-SIDED LOW BACK PAIN WITH LEFT-SIDED SCIATICA: ICD-10-CM

## 2023-04-06 RX ORDER — LIDOCAINE 50 MG/G
2 PATCH TOPICAL EVERY 24 HOURS
Qty: 60 PATCH | Refills: 0 | OUTPATIENT
Start: 2023-04-06

## 2023-05-16 DIAGNOSIS — F41.9 ANXIETY: ICD-10-CM

## 2023-05-16 RX ORDER — BUSPIRONE HYDROCHLORIDE 5 MG/1
5 TABLET ORAL 2 TIMES DAILY PRN
Qty: 60 TABLET | Refills: 0 | OUTPATIENT
Start: 2023-05-16

## 2023-08-03 ENCOUNTER — LAB REQUISITION (OUTPATIENT)
Dept: LAB | Facility: HOSPITAL | Age: 88
End: 2023-08-03
Payer: MEDICARE

## 2023-08-03 DIAGNOSIS — R30.0 DYSURIA: ICD-10-CM

## 2023-08-03 LAB
BACTERIA UR QL AUTO: ABNORMAL /HPF
BILIRUB UR QL STRIP: NEGATIVE
CLARITY UR: ABNORMAL
COARSE GRAN CASTS URNS QL MICRO: ABNORMAL /LPF
COLOR UR: YELLOW
GLUCOSE UR STRIP-MCNC: NEGATIVE MG/DL
HGB UR QL STRIP.AUTO: NEGATIVE
HYALINE CASTS UR QL AUTO: ABNORMAL /LPF
KETONES UR QL STRIP: NEGATIVE
LEUKOCYTE ESTERASE UR QL STRIP.AUTO: ABNORMAL
MUCOUS THREADS URNS QL MICRO: ABNORMAL /HPF
NITRITE UR QL STRIP: NEGATIVE
PH UR STRIP.AUTO: 5.5 [PH] (ref 5–8)
PROT UR QL STRIP: NEGATIVE
RBC # UR STRIP: ABNORMAL /HPF
REF LAB TEST METHOD: ABNORMAL
SP GR UR STRIP: 1.02 (ref 1–1.03)
SQUAMOUS #/AREA URNS HPF: ABNORMAL /HPF
TRANS CELLS #/AREA URNS HPF: ABNORMAL /HPF
UROBILINOGEN UR QL STRIP: ABNORMAL
WBC # UR STRIP: ABNORMAL /HPF

## 2023-08-03 PROCEDURE — 81001 URINALYSIS AUTO W/SCOPE: CPT | Performed by: PHYSICAL MEDICINE & REHABILITATION

## 2023-08-03 PROCEDURE — 87086 URINE CULTURE/COLONY COUNT: CPT | Performed by: PHYSICAL MEDICINE & REHABILITATION

## 2023-08-05 LAB — BACTERIA SPEC AEROBE CULT: NORMAL

## 2023-08-07 NOTE — ED NOTES
Presents ems from home for poss stroke, l sided facial droop l sided weakness in hand. Last known well was this past Friday Hx of TIA. Recently dx with UTI and currently being treated for UTI

## 2023-08-07 NOTE — ED PROVIDER NOTES
Time: 1:22 PM EDT  Date of encounter:  8/7/2023  Independent Historian/Clinical History and Information was obtained by:   Family    History is limited by: Dementia/altered mental status    Chief Complaint: Left-sided weakness and decreased responsiveness for the past 3 days      History of Present Illness:  Patient is a 88 y.o. year old female who presents to the emergency department for evaluation of AMS, weakness, infection. Brought in by daughter and daughter in law. Has had AMS and left sided weakness since Friday.  Expressive aphasia and concerns that she is no longer able to swallow her food.  She lives at home with home health and palliative nurses. Has had mini-strokes and previously declined admission for stroke telling family that she was ready when it was time. Is currently on doxycycline for infection but home health nurses sending another urine culture. Has had cough x 2 days. Complains of abdominal pain. Currently on 2L NC, no home O2 requirement. Also notes she is on dexamethasone chronically for back pain. (Tobi Ascencio PA-C provider in triage 1:27 PM EDT )     HPI    Patient Care Team  Primary Care Provider: Rebeka Ohara APRN    Past Medical History:     Allergies   Allergen Reactions    Linzess [Linaclotide] Diarrhea    Duloxetine Unknown - Low Severity    Zolpidem Unknown - Low Severity    Zolpidem Tartrate Other (See Comments)    Aspirin Unknown - Low Severity    Ciprofloxacin Diarrhea and Unknown - High Severity    Duloxetine Hcl GI Intolerance    Ibandronic Acid Unknown - Low Severity    Levofloxacin Mental Status Change    Metronidazole Unknown - Low Severity    Nitrofurantoin Unknown - Low Severity    Nortriptyline Hcl Delirium    Pregabalin Unknown - Low Severity    Ranitidine Hcl Unknown - Low Severity    Sulfa Antibiotics Hives    Sulfamethoxazole-Trimethoprim Unknown - Low Severity    Tramadol Unknown - Low Severity     Past Medical History:   Diagnosis Date    Anemia      Anxiety     Arthritis     spine    Back pain     Cramps, muscle, general     Degenerative lumbar disc 05/04/2014    LUMBAR /LS DISC DEGENERATION    Difficulty in swallowing     Dizziness     Dysuria 05/24/2019    EKG abnormality 08/12/2016    Fatigue     Headache     Hearing loss     Hyperlipidemia     Hypertension, essential     Hypothyroidism     Imbalance     Incontinence in female     Ingrowing toenail     Insomnia     Iron deficiency anemia 02/01/2017    Joint pain     Leg pain     Lightheadedness 03/14/2018    The patient reports onset of lightheadedness following heart surgery in 2016. She notes that this tends to be brought on by a change in position. I would consider orthostasis as a potential culprit. I will attempt to pursue orthostatic blood pressures but the patient notes that it is hard for her to lay flaat on the exam table. I will request her records be sent for my review.     Low back pain 06/09/2014    Lumbar degenerative disc disease 02/01/2017    Macular degeneration     Dharmesh    Mitral valve disorder     Mitral valve replaced 12/07/2017    Nocturia 05/24/2019    Pacemaker 02/23/2018    Peptic ulcer disease 02/01/2017    Ringing in ear     Sciatica 01/09/2015    Shortness of breath     Urethral caruncle 05/24/2019    Vision changes     Weakness      Past Surgical History:   Procedure Laterality Date    APPENDECTOMY  1951    BREAST BIOPSY Left 1955    CATARACT EXTRACTION WITH INTRAOCULAR LENS IMPLANT Bilateral     1992, 2001    COLON SURGERY  2018    Dr. Alonso, Moderate Diverticulosis    COLONOSCOPY      CYSTOSCOPY  02/18/2020    excision of urethral prolapse w/ Dr. Ibanez    CYSTOSCOPY  02/18/2020    Excision of Urethral Prolapse w/ Dr. Ibanez    ENDOSCOPY  2018    HEMORRHOIDECTOMY      2009/ 2014 hemorrhoid banding     HYSTERECTOMY  1960    total     LUMBAR LAMINECTOMY  07/2007    MITRAL VALVE REPLACEMENT  11/17/2016    Dr. Singh in [place]    PACEMAKER IMPLANTATION  11/2016     MRI compatible    WRIST SURGERY Left      Family History   Problem Relation Age of Onset    Heart disease Father     Diabetes Father     Heart disease Sister     Diabetes Sister     Heart disease Other     Heart disease Other     Colon cancer Neg Hx        Home Medications:  Prior to Admission medications    Medication Sig Start Date End Date Taking? Authorizing Provider   busPIRone (BUSPAR) 7.5 MG tablet Take 1 tablet by mouth 2 (Two) Times a Day. 12/1/22   Kika Stevenson APRN   Cholecalciferol (Vitamin D-3) 25 MCG (1000 UT) capsule Take 25,000 Units by mouth Daily.    ProviderRaulito MD   dexAMETHasone (DECADRON) 1 MG tablet Take 1 tablet by mouth 2 (Two) Times a Day With Meals.    Raulito Cota MD   doxycycline (VIBRAMYICN) 100 MG tablet Take 1 tablet by mouth 2 (Two) Times a Day.    ProviderRaulito MD   estradiol (ESTRACE) 0.1 MG/GM vaginal cream INSERT 1 G INTO THE VAGINA DAILY. 9/27/22   Kika Stevenson APRN   levothyroxine (SYNTHROID, LEVOTHROID) 50 MCG tablet TAKE 1 TABLET BY MOUTH EVERY DAY 1/30/23   Kika Stevenson APRN   lidocaine (LIDODERM) 5 % PLACE 2 PATCHES ON THE SKIN AS DIRECTED BY PROVIDER DAILY. REMOVE & DISCARD PATCH WITHIN 12 HOURS OR AS DIRECTED BY MD 2/7/23   Starr Meek APRN   losartan (COZAAR) 25 MG tablet Take 1 tablet by mouth Daily. 8/30/22   Kika Stevenson APRN   Mirabegron ER (Myrbetriq) 25 MG tablet sustained-release 24 hour 24 hr tablet Take 1 tablet by mouth Daily. Indications: lot [id number] exp 5/23 2 boxes 6/28/22   Kika Stevenson APRN   nebivolol (BYSTOLIC) 10 MG tablet TAKE 1.5 TABLETS BY MOUTH EVERY NIGHT 1/30/23   Daniel Anthony MD   oxyCODONE (oxyCONTIN) 10 MG 12 hr tablet Take 1 tablet by mouth Every 12 (Twelve) Hours.    ProviderRaulito MD   pantoprazole (PROTONIX) 40 MG EC tablet TAKE 1 TABLET BY MOUTH EVERY DAY 11/4/22   Brittani Larios APRN   polyethylene glycol (MIRALAX) 17  "GM/SCOOP powder Take 17 g by mouth Daily As Needed.    Provider, MD Raulito   spironolactone (ALDACTONE) 25 MG tablet Take 1 tablet by mouth Every Other Day. 8/29/22   Verónica Saucedo APRN   traZODone (DESYREL) 50 MG tablet TAKE 1 TABLET BY MOUTH EVERY DAY AT NIGHT 2/2/23   Kika Stevenson APRN   vitamin B-12 (CYANOCOBALAMIN) 1000 MCG tablet Take 1 tablet by mouth Daily.    ProviderRaulito MD   azithromycin (Zithromax Z-Roger) 250 MG tablet Take 2 tablets by mouth on day 1, then 1 tablet daily on days 2-5 11/21/22 8/7/23  Kika Stevenson APRN   HYDROcodone-acetaminophen (NORCO) 5-325 MG per tablet hydrocodone 5 mg-acetaminophen 325 mg tablet   1/2 tablet po Q6 hours prn pain  8/7/23  ProviderRaulito MD   LORazepam (ATIVAN) 0.5 MG tablet Take 1 tablet by mouth Daily As Needed for Anxiety. 5/4/22 8/7/23  Kika Stevenson APRN        Social History:   Social History     Tobacco Use    Smoking status: Never    Smokeless tobacco: Never   Vaping Use    Vaping Use: Never used   Substance Use Topics    Alcohol use: Never    Drug use: Never         Review of Systems:  Review of Systems   Unable to perform ROS: Mental status change   Constitutional:  Positive for fatigue.   Neurological:  Positive for weakness.      Physical Exam:  /65   Pulse 73   Temp 98.5 øF (36.9 øC) (Oral)   Resp 16   Ht 157.5 cm (62\")   Wt 46 kg (101 lb 6.6 oz)   LMP  (LMP Unknown)   SpO2 97%   BMI 18.55 kg/mý     Physical Exam  Vitals and nursing note reviewed.   Constitutional:       General: She is awake.      Appearance: Normal appearance.   HENT:      Head: Normocephalic and atraumatic.      Nose: Nose normal.      Mouth/Throat:      Mouth: Mucous membranes are moist.   Eyes:      Extraocular Movements: Extraocular movements intact.      Pupils: Pupils are equal, round, and reactive to light.   Cardiovascular:      Rate and Rhythm: Normal rate and regular rhythm.      Heart sounds: Normal " heart sounds.   Pulmonary:      Effort: Pulmonary effort is normal. No respiratory distress.      Breath sounds: Normal breath sounds. Rhonchi present. No wheezing or rales.   Abdominal:      General: Bowel sounds are normal.      Palpations: Abdomen is soft.      Tenderness: There is abdominal tenderness (suprapubic). There is no guarding or rebound.      Comments: No rigidity   Musculoskeletal:         General: No tenderness. Normal range of motion.      Cervical back: Normal range of motion and neck supple.   Skin:     General: Skin is warm and dry.      Coloration: Skin is not jaundiced.   Neurological:      General: No focal deficit present.      Mental Status: She is alert. She is disoriented and confused.      GCS: GCS eye subscore is 3. GCS verbal subscore is 4. GCS motor subscore is 6.      Cranial Nerves: No cranial nerve deficit.      Motor: Motor function is intact.   Psychiatric:         Behavior: Behavior is agitated.                Procedures:  Procedures      Medical Decision Making:      Comorbidities that affect care:    Pacemaker, hypertension, hyperlipidemia, peptic ulcer disease, TIA    External Notes reviewed:    Encounter review: Cardiology Dr. Rosenthal billing encounter 7/4/2023      The following orders were placed and all results were independently analyzed by me:  Orders Placed This Encounter   Procedures    Blood Culture - Blood,    Blood Culture - Blood,    COVID-19,CEPHEID/SHANTHI,COR/ALONSO/PAD/NÉSTOR/MAD IN-HOUSE(OR EMERGENT/ADD-ON),NP SWAB IN TRANSPORT MEDIA 3-4 HR TAT, RT-PCR - Swab, Nasopharynx    Influenza Antigen, Rapid - Swab, Nasopharynx    Urine Culture - Urine,    XR Chest 1 View    CT Head Without Contrast    CT Abdomen Pelvis Without Contrast    [place] Draw    Comprehensive Metabolic Panel    Single High Sensitivity Troponin T    Magnesium    Urinalysis With Microscopic If Indicated (No Culture) - Urine, Clean Catch    CBC Auto Differential    Scan Slide    Urinalysis, Microscopic  Only - Urine, Clean Catch    High Sensitivity Troponin T 2Hr    Lactic Acid, Plasma    BNP    Undress & Gown    Continuous Pulse Oximetry    Vital Signs    Orthostatic Blood Pressure    Surgery (on-call MD unless specified)    POC Glucose Once    POC Glucose Once    ECG 12 Lead ED Triage Standing Order; Weak / Dizzy / AMS    CBC & Differential    Green Top (Gel)    Lavender Top    Gold Top - SST    Light Blue Top       Medications Given in the Emergency Department:  Medications   sodium chloride 0.9 % bolus 1,000 mL (0 mL Intravenous Stopped 8/7/23 1513)   cefTRIAXone (ROCEPHIN) IVPB 1,000 mg (0 mg Intravenous Stopped 8/7/23 1431)        ED Course:    ED Course as of 08/07/23 2320   Mon Aug 07, 2023   1358 I have personally interpreted the EKG today and it shows no evidence of any acute ischemia or heart arrhythmia.  Paced rhythm [RP]   1557 Case discussed with Dr. Abrams who is happy to consult on this patient.  Recommends hospitalist admission. [RP]   1608 Bacteria, UA(!): Trace [RP]   1659 Family has discussed this case at bedside with both the surgeon Dr. Abrams and hospitalist Dr. Weiss.  They elect to take the patient home for end-of-life care based on patient's past stated requests. [RP]      ED Course User Index  [RP] Rafal Beavers MD       Labs:    Lab Results (last 24 hours)       Procedure Component Value Units Date/Time    POC Glucose Once [[id number]]  (Abnormal) Collected: 08/07/23 1158    Specimen: Blood Updated: 08/07/23 1229     Glucose 122 mg/dL      Comment: Serial Number: [id number]:  [id number]       CBC & Differential [[id number]]  (Abnormal) Collected: 08/07/23 1220    Specimen: Blood Updated: 08/07/23 1251    Narrative:      The following orders were created for panel order CBC & Differential.  Procedure                               Abnormality         Status                     ---------                               -----------         ------                     CBC Auto  Differential[[id number]]        Abnormal            Final result               Scan Slide[[id number]]                                       Final result                 Please view results for these tests on the individual orders.    Comprehensive Metabolic Panel [[id number]]  (Abnormal) Collected: 08/07/23 1220    Specimen: Blood Updated: 08/07/23 1244     Glucose 130 mg/dL      BUN 80 mg/dL      Creatinine 2.91 mg/dL      Sodium 144 mmol/L      Potassium 4.8 mmol/L      Comment: Slight hemolysis detected by analyzer. Results may be affected.        Chloride 106 mmol/L      CO2 19.9 mmol/L      Calcium 10.3 mg/dL      Total Protein 7.6 g/dL      Albumin 2.8 g/dL      ALT (SGPT) 11 U/L      AST (SGOT) 13 U/L      Alkaline Phosphatase 135 U/L      Total Bilirubin 0.5 mg/dL      Globulin 4.8 gm/dL      A/G Ratio 0.6 g/dL      BUN/Creatinine Ratio 27.5     Anion Gap 18.1 mmol/L      eGFR 15.1 mL/min/1.73     Narrative:      GFR Normal >60  Chronic Kidney Disease <60  Kidney Failure <15    The GFR formula is only valid for adults with stable renal function between ages 18 and 70.    Single High Sensitivity Troponin T [[id number]]  (Abnormal) Collected: 08/07/23 1220    Specimen: Blood Updated: 08/07/23 1255     HS Troponin T 123 ng/L     Narrative:      High Sensitive Troponin T Reference Range:  <10.0 ng/L- Negative Female for AMI  <15.0 ng/L- Negative Male for AMI  >=10 - Abnormal Female indicating possible myocardial injury.  >=15 - Abnormal Male indicating possible myocardial injury.   Clinicians would have to utilize clinical acumen, EKG, Troponin, and serial changes to determine if it is an Acute Myocardial Infarction or myocardial injury due to an underlying chronic condition.         Magnesium [[id number]]  (Normal) Collected: 08/07/23 1220    Specimen: Blood Updated: 08/07/23 1244     Magnesium 2.1 mg/dL     Urinalysis With Microscopic If Indicated (No Culture) - Urine, Clean Catch [[id number]]  (Abnormal) Collected:  08/07/23 1220    Specimen: Urine, Clean Catch Updated: 08/07/23 1302     Color, UA Yellow     Appearance, UA Cloudy     pH, UA 6.0     Specific Gravity, UA 1.016     Glucose, UA Negative     Ketones, UA Negative     Bilirubin, UA Negative     Blood, UA Trace     Protein, UA Trace     Leuk Esterase, UA Large (3+)     Nitrite, UA Negative     Urobilinogen, UA 0.2 E.U./dL    CBC Auto Differential [[id number]]  (Abnormal) Collected: 08/07/23 1220    Specimen: Blood Updated: 08/07/23 1251     WBC 23.56 10*3/mm3      RBC 3.99 10*6/mm3      Hemoglobin 14.0 g/dL      Hematocrit 44.1 %      .5 fL      MCH 35.1 pg      MCHC 31.7 g/dL      RDW 13.1 %      RDW-SD 53.9 fl      MPV 10.7 fL      Platelets 280 10*3/mm3      Neutrophil % 91.3 %      Lymphocyte % 4.5 %      Monocyte % 2.9 %      Eosinophil % 0.1 %      Basophil % 0.4 %      Immature Grans % 0.8 %      Neutrophils, Absolute 21.50 10*3/mm3      Lymphocytes, Absolute 1.07 10*3/mm3      Monocytes, Absolute 0.68 10*3/mm3      Eosinophils, Absolute 0.02 10*3/mm3      Basophils, Absolute 0.09 10*3/mm3      Immature Grans, Absolute 0.20 10*3/mm3      nRBC 0.0 /100 WBC     Scan Slide [[id number]] Collected: 08/07/23 1220    Specimen: Blood Updated: 08/07/23 1251     Macrocytes Mod/2+     Toxic Granulation Slight/1+     Platelet Estimate Adequate    Urinalysis, Microscopic Only - Urine, Clean Catch [[id number]]  (Abnormal) Collected: 08/07/23 1220    Specimen: Urine, Clean Catch Updated: 08/07/23 1302     RBC, UA 3-5 /HPF      WBC, UA 21-30 /HPF      Bacteria, UA Trace /HPF      Squamous Epithelial Cells, UA 21-30 /HPF      Hyaline Casts, UA None Seen /LPF      Methodology Manual Light Microscopy    Urine Culture - Urine, Urine, Clean Catch [[id number]] Collected: 08/07/23 1220    Specimen: Urine, Clean Catch Updated: 08/07/23 1347    BNP [[id number]]  (Abnormal) Collected: 08/07/23 1220    Specimen: Blood Updated: 08/07/23 1425     proBNP 7,301.0 pg/mL     Narrative:       Among patients with dyspnea, NT-proBNP is highly sensitive for the detection of acute congestive heart failure. In addition NT-proBNP of <300 pg/ml effectively rules out acute congestive heart failure with 99% negative predictive value.      Blood Culture - Blood, Arm, Right [[id number]] Collected: 08/07/23 1316    Specimen: Blood from Arm, Right Updated: 08/07/23 1322    Blood Culture - Blood, Arm, Left [[id number]] Collected: 08/07/23 1316    Specimen: Blood from Arm, Left Updated: 08/07/23 1322    Lactic Acid, Plasma [[id number]]  (Normal) Collected: 08/07/23 1316    Specimen: Blood from Arm, Left Updated: 08/07/23 1342     Lactate 1.7 mmol/L     COVID-19,CEPHEID/SHANTHI,COR/ALONSO/PAD/NÉSTOR/MAD IN-HOUSE(OR EMERGENT/ADD-ON),NP SWAB IN TRANSPORT MEDIA 3-4 HR TAT, RT-PCR - Swab, Nasopharynx [[id number]]  (Normal) Collected: 08/07/23 1338    Specimen: Swab from Nasopharynx Updated: 08/07/23 1437     COVID19 Not Detected    Narrative:      Fact sheet for providers: https://www.fda.gov/media/902089/download     Fact sheet for patients: https://www.fda.gov/media/404088/download  Fact sheet for providers: https://www.fda.gov/media/098634/download     Fact sheet for patients: https://www.fda.gov/media/876050/download    Influenza Antigen, Rapid - Swab, Nasopharynx [[id number]]  (Normal) Collected: 08/07/23 1338    Specimen: Swab from Nasopharynx Updated: 08/07/23 1418     Influenza A Ag, EIA Negative     Influenza B Ag, EIA Negative    High Sensitivity Troponin T 2Hr [[id number]]  (Abnormal) Collected: 08/07/23 1432    Specimen: Blood Updated: 08/07/23 1515     HS Troponin T 115 ng/L      Troponin T Delta -8 ng/L     Narrative:      High Sensitive Troponin T Reference Range:  <10.0 ng/L- Negative Female for AMI  <15.0 ng/L- Negative Male for AMI  >=10 - Abnormal Female indicating possible myocardial injury.  >=15 - Abnormal Male indicating possible myocardial injury.   Clinicians would have to utilize clinical acumen, EKG, Troponin,  and serial changes to determine if it is an Acute Myocardial Infarction or myocardial injury due to an underlying chronic condition.                  Imaging:    CT Abdomen Pelvis Without Contrast    Result Date: 8/7/2023  PROCEDURE: CT ABDOMEN PELVIS WO CONTRAST  COMPARISON: None  INDICATIONS: flank pain, poor historian  TECHNIQUE: CT images were created without intravenous contrast.   PROTOCOL:   Standard imaging protocol performed    RADIATION:   DLP: 339mGy*cm   Automated exposure control was utilized to minimize radiation dose.  FINDINGS:  Lung bases are clear.  There is a small hiatal hernia.  There are bilateral nonobstructing renal calculi up to 7 mm on the left.  Liver, spleen, pancreas, adrenal glands, gallbladder have an unremarkable noncontrast appearance.  There appears to have been right hemicolectomy.  There is colonic diverticulosis.  In the pelvis, just superior to the urinary bladder, there is an approximately 10 x 5 cm stool collection that is not clearly within an intestinal loop.  This contacts the sigmoid colon.  There is an apparent defect in the medial wall of the sigmoid colon, and some high density material that is present in the sigmoid colon appears to be extruding into the localized stool collection.  There is inflammatory stranding in the area of abnormality.  There is a large amount of extraperitoneal emphysema in the pelvis, surrounding the bladder and tracking superiorly in the anterior abdominal wall, extending into the lower chest wall on the left.  There is also extension of pelvic extraperitoneal emphysema superiorly in the retroperitoneum including to the right of the IVC and into the right renal hilum.  There is extension of gas into both inguinal canals and the adductor musculature of the upper left leg.  There are a few bubbles of free pneumoperitoneum, noted in the region of the left hepatic lobe.  The rectum is noted to be distended with stool.  There is no free fluid in  the pelvis.  There is no walled off fluid collection typical for abscess.       There is a stool collection in the pelvis that appears to be extraluminal.  There is an apparent defect in the wall of the sigmoid colon adjacent to the stool collection, and some high density material in the sigmoid colon appears to extrude through the defect into the stool collection.  Considerations include stercoral perforation or perforated diverticulum with stool extrusion.  There is a large amount of extraperitoneal gas in the pelvis that tracks superiorly in the abdominal wall and retroperitoneum.  There is also a small amount of pneumoperitoneum.  Additional findings include rectal stool impaction, diffuse colonic diverticulosis, left nephrolithiasis, and a hiatal hernia  These results were discussed with Dr. Beavers in the emergency department     IAIN DENNISON MD       Electronically Signed and Approved By: IAIN DENNISON MD on 8/07/2023 at 15:47             CT Head Without Contrast    Result Date: 8/7/2023  PROCEDURE: CT HEAD WO CONTRAST  COMPARISON:  None INDICATIONS: Left-sided weakness and aphasia x3 days  PROTOCOL:   Standard imaging protocol performed    RADIATION:   DLP: 953.9mGy*cm   MA and/or KV was adjusted to minimize radiation dose.     TECHNIQUE: After obtaining the patient's consent, CT images were obtained without non-ionic intravenous contrast material.  FINDINGS:  There is no hemorrhage, edema, or mass effect.  There are chronic small vessel ischemic white matter changes.  The CSF spaces are age-appropriate.  There are no abnormal extra-axial fluid collections         1. No intracranial hemorrhage  2. No CT changes of acute major vascular territory brain ischemia at this time     IAIN DENNISON MD       Electronically Signed and Approved By: IAIN DENNISON MD on 8/07/2023 at 15:16             XR Chest 1 View    Result Date: 8/7/2023  PROCEDURE: XR CHEST 1 VW  COMPARISON: [facility], CR, XR  CHEST 1 VW, 10/04/2022, 17:04.  INDICATIONS: WEAK/DIZZY/AMS TODAY  FINDINGS:  No new consolidations or pleural effusions are observed. The cardiac silhouette and mediastinum are unchanged.  Postoperative changes are noted.  A left cardiac pacemaker/AICD is observed with leads intact.  No definitive acute osseous abnormalities are seen on this single view.        1. No change from the previous study with no evidence for acute cardiopulmonary process.         ELIZABETH GARCIA MD       Electronically Signed and Approved By: ELIZABETH GARCIA MD on 8/07/2023 at 12:47                Differential Diagnosis and Discussion:    Altered Mental Status: Based on the patient's signs and symptoms, differential diagnosis includes but is not limited to meningitis, stroke, sepsis, subarachnoid hemorrhage, intracranial bleeding, encephalitis, and metabolic encephalopathy.    All labs were reviewed and interpreted by me.  All X-rays impressions were independently interpreted by me.  EKG was interpreted by me.  CT scan radiology impression was interpreted by me.    MDM     Amount and/or Complexity of Data Reviewed  Clinical lab tests: reviewed  Tests in the radiology section of CPTr: reviewed  Tests in the medicine section of CP[facility]: reviewed  Decide to obtain previous medical records or to obtain history from someone other than the patient: yes         Critical Care Note: Total Critical Care time of 60 minutes. Total critical care time documented does not include time spent on separately billed procedures for services of nurses or physician assistants. I personally saw and examined the patient. I have reviewed all diagnostic interpretations and treatment plans as written. I was present for the key portions of any procedures performed and the inclusive time noted in any critical care statement. Critical care time includes patient management by me, time spent at the patients bedside,  time to review lab and imaging results, discussing patient  care, documentation in the medical record, and time spent with family or caregiver.    Patient Care Considerations:          Consultants/Shared Management Plan:    Hospitalist: I have discussed the case with Dr. Weiss who agrees to accept the patient for admission.  Consultant: I have discussed the case with Dr. Abrams, on-call general surgery who states he will come evaluate the patient in the emergency department.    Social Determinants of Health:    Patient has presented with family members who are responsible, reliable and will ensure follow up care.      Disposition and Care Coordination:    Discharged: The patient is critically ill and was accepted for admission, however patient is 88 years old, DNR and very little chance of living through this emergent condition.  Family has elected to take the patient home as she had a desire to not ever die in the hospital.  This discharge is for end-of-life preferences and the family is aware that the patient will die from this medical issue.        Final diagnoses:   Bowel perforation   Acute renal failure, unspecified acute renal failure type   End of life care        ED Disposition       ED Disposition   Discharge    Condition   Stable    Comment   --               This medical record created using voice recognition software.             Rafal Beavers MD  08/07/23 3511

## 2023-08-07 NOTE — SIGNIFICANT NOTE
08/07/23 1732   Plan   Final Note SW completed referral on behalf of the family for [facility]. SW updated provider.

## 2023-08-07 NOTE — CONSULTS
General Surgery/Colorectal Surgery Note    Patient Name:  Fabiana Herbert  YOB: 1934  [id number]    Referring Provider: No ref. provider found      Patient Care Team:  Rebeka Ohara APRN as PCP - General (Nurse Practitioner)  Peter Graves MD as Consulting Physician (Neurology)  Gavin Good MD as Consulting Physician (Cardiology)  Xuan Ibanez MD as Consulting Physician (Urology)  Kamilah Hutton PA as Consulting Physician (Pain Medicine)    Chief complaint perforation    Subjective .     History of present illness:    She comes in for evaluation of possible stroke.  History of decreased LOC since Friday.  Family brought her in today for further evaluation.  Work-up in ED with WBC 23, lactic acid 1.7, total bilirubin 0.5, creatinine 2.9, platelets 280, hemoglobin 14, large 3+ leukocyte, nitrite negative, flu negative, blood cultures pending, COVID-negative, CT head negative, CT abdomen and pelvis with stool collection in the pelvis appears to be extraluminal with defect in the wall of the sigmoid colon questionable stercoral perforation versus perforated diverticulum, large amount of extraperitoneal gas, small amount of pneumoperitoneum.  Given Rocephin.      History:  Past Medical History:   Diagnosis Date    Anemia     Anxiety     Arthritis     spine    Back pain     Cramps, muscle, general     Degenerative lumbar disc 05/04/2014    LUMBAR /LS DISC DEGENERATION    Difficulty in swallowing     Dizziness     Dysuria 05/24/2019    EKG abnormality 08/12/2016    Fatigue     Headache     Hearing loss     Hyperlipidemia     Hypertension, essential     Hypothyroidism     Imbalance     Incontinence in female     Ingrowing toenail     Insomnia     Iron deficiency anemia 02/01/2017    Joint pain     Leg pain     Lightheadedness 03/14/2018    The patient reports onset of lightheadedness following heart surgery in 2016. She notes that this tends to be brought on by a change in  position. I would consider orthostasis as a potential culprit. I will attempt to pursue orthostatic blood pressures but the patient notes that it is hard for her to lay flaat on the exam table. I will request her records be sent for my review.     Low back pain 06/09/2014    Lumbar degenerative disc disease 02/01/2017    Macular degeneration     Dharmesh    Mitral valve disorder     Mitral valve replaced 12/07/2017    Nocturia 05/24/2019    Pacemaker 02/23/2018    Peptic ulcer disease 02/01/2017    Ringing in ear     Sciatica 01/09/2015    Shortness of breath     Urethral caruncle 05/24/2019    Vision changes     Weakness        Past Surgical History:   Procedure Laterality Date    APPENDECTOMY  1951    BREAST BIOPSY Left 1955    CATARACT EXTRACTION WITH INTRAOCULAR LENS IMPLANT Bilateral     1992, 2001    COLON SURGERY  2018    Dr. Alonso, Moderate Diverticulosis    COLONOSCOPY      CYSTOSCOPY  02/18/2020    excision of urethral prolapse w/ Dr. Ibanez    CYSTOSCOPY  02/18/2020    Excision of Urethral Prolapse w/ Dr. Ibanez    ENDOSCOPY  2018    HEMORRHOIDECTOMY      2009/ 2014 hemorrhoid banding     HYSTERECTOMY  1960    total     LUMBAR LAMINECTOMY  07/2007    MITRAL VALVE REPLACEMENT  11/17/2016    Dr. Singh in [place]    PACEMAKER IMPLANTATION  11/2016    MRI compatible    WRIST SURGERY Left        Family History   Problem Relation Age of Onset    Heart disease Father     Diabetes Father     Heart disease Sister     Diabetes Sister     Heart disease Other     Heart disease Other     Colon cancer Neg Hx        Social History     Tobacco Use    Smoking status: Never    Smokeless tobacco: Never   Vaping Use    Vaping Use: Never used   Substance Use Topics    Alcohol use: Never    Drug use: Never       Review of Systems  All systems were reviewed and negative except for:   Review of Systems   Constitutional: Negative for chills, fever and unexpected weight loss.   HENT: Negative for congestion,  nosebleeds and voice change.    Eyes: Negative for blurred vision, double vision and discharge.   Respiratory: Negative for apnea, chest tightness and shortness of breath.    Cardiovascular: Negative for chest pain and leg swelling.   Gastrointestinal:        See HPI   Endocrine: Negative for cold intolerance and heat intolerance.   Genitourinary: Negative for dysuria, hematuria and urgency.   Musculoskeletal: Negative for back pain, joint swelling and neck pain.   Skin: Negative for color change and dry skin.   Neurological: Negative for dizziness and confusion.   Hematological: Negative for adenopathy.   Psychiatric/Behavioral: Negative for agitation and behavioral problems.     MEDS:  Prior to Admission medications    Medication Sig Start Date End Date Taking? Authorizing Provider   busPIRone (BUSPAR) 15 MG tablet Take 1 tablet by mouth 2 (Two) Times a Day. 7/12/23  Yes Raulito Cota MD   Cholecalciferol (Vitamin D-3) 25 MCG (1000 UT) capsule Take 1,000 Units by mouth Daily.   Yes Raulito Cota MD   dexAMETHasone (DECADRON) 1 MG tablet Take 1 tablet by mouth Daily With Breakfast.   Yes ProviderRaulito MD   doxycycline (VIBRAMYCIN) 100 MG capsule Take 1 capsule by mouth 2 (Two) Times a Day. 8/4/23  Yes Raulito Cota MD   furosemide (LASIX) 20 MG tablet Take 1 tablet by mouth Daily. 7/12/23  Yes Raulito Cota MD   levothyroxine (SYNTHROID, LEVOTHROID) 50 MCG tablet TAKE 1 TABLET BY MOUTH EVERY DAY 1/30/23  Yes Kika Stevenson APRN   losartan (COZAAR) 25 MG tablet Take 1 tablet by mouth Daily. 8/30/22  Yes Kika Stevenson APRN   nebivolol (BYSTOLIC) 10 MG tablet TAKE 1.5 TABLETS BY MOUTH EVERY NIGHT 1/30/23  Yes Daniel Anthony MD   oxyCODONE (ROXICODONE) 10 MG tablet Take 1-1.5 tablets by mouth Every 4 (Four) Hours As Needed. 1 TABLET FOR MODERATE PAIN, 1.5 TABLETS FOR SEVERE PAIN 7/24/23  Yes Raulito Cota MD   polyethylene glycol (MIRALAX) 17 GM/SCOOP  powder Take 17 g by mouth Daily As Needed.   Yes Raulito Cota MD   Senexon-S 8.6-50 MG per tablet Take 1-2 tablets by mouth At Night As Needed. 4/19/23  Yes Raulito Cota MD   spironolactone (ALDACTONE) 25 MG tablet Take 1 tablet by mouth Every Other Day. 8/29/22  Yes Verónica Saucedo APRN   traZODone (DESYREL) 50 MG tablet Take 1.5 tablets by mouth Every Night.   Yes Raulito Cota MD   vitamin B-12 (CYANOCOBALAMIN) 1000 MCG tablet Take 1 tablet by mouth Daily.   Yes Raulito Cota MD   azithromycin (Zithromax Z-Roger) 250 MG tablet Take 2 tablets by mouth on day 1, then 1 tablet daily on days 2-5 11/21/22 8/7/23  Kika Stevenson APRN   busPIRone (BUSPAR) 7.5 MG tablet Take 1 tablet by mouth 2 (Two) Times a Day. 12/1/22 8/7/23  Kika Stevenson APRN   doxycycline (VIBRAMYICN) 100 MG tablet Take 1 tablet by mouth 2 (Two) Times a Day.  8/7/23  Raulito Cota MD   estradiol (ESTRACE) 0.1 MG/GM vaginal cream INSERT 1 G INTO THE VAGINA DAILY. 9/27/22 8/7/23  Kika Stevenson APRN   HYDROcodone-acetaminophen (NORCO) 5-325 MG per tablet hydrocodone 5 mg-acetaminophen 325 mg tablet   1/2 tablet po Q6 hours prn pain  8/7/23  Raulito Cota MD   lidocaine (LIDODERM) 5 % PLACE 2 PATCHES ON THE SKIN AS DIRECTED BY PROVIDER DAILY. REMOVE & DISCARD PATCH WITHIN 12 HOURS OR AS DIRECTED BY MD 2/7/23 8/7/23  Starr Meek APRN   LORazepam (ATIVAN) 0.5 MG tablet Take 1 tablet by mouth Daily As Needed for Anxiety. 5/4/22 8/7/23  Kika Stevenson APRN   Mirabegron ER (Myrbetriq) 25 MG tablet sustained-release 24 hour 24 hr tablet Take 1 tablet by mouth Daily. Indications: lot [id number] exp 5/23 2 boxes 6/28/22 8/7/23  Kika Stevenson APRN   oxyCODONE (oxyCONTIN) 10 MG 12 hr tablet Take 1 tablet by mouth Every 12 (Twelve) Hours.  8/7/23  Provider, MD Raulito   pantoprazole (PROTONIX) 40 MG EC tablet TAKE 1 TABLET BY MOUTH EVERY  DAY 11/4/22 8/7/23  Brittani Larios APRN   traZODone (DESYREL) 50 MG tablet TAKE 1 TABLET BY MOUTH EVERY DAY AT NIGHT 2/2/23 8/7/23  Kika Stevenson APRN        piperacillin-tazobactam, 4.5 g, Intravenous, Once         sodium chloride, 100 mL/hr, Last Rate: 100 mL/hr (08/07/23 1431)         Allergies:  Linzess [linaclotide], Duloxetine, Zolpidem, Zolpidem tartrate, Aspirin, Ciprofloxacin, Duloxetine hcl, Ibandronic acid, Levofloxacin, Metronidazole, Nitrofurantoin, Nortriptyline hcl, Pregabalin, Ranitidine hcl, Sulfa antibiotics, Sulfamethoxazole-trimethoprim, and Tramadol    Objective     Vital Signs   Temp:  [98.5 øF (36.9 øC)] 98.5 øF (36.9 øC)  Heart Rate:  [74-95] 74  Resp:  [16] 16  BP: ()/(64-71) 95/64    Physical Exam:     General Appearance:    Alert, cooperative, in no acute distress   Head:    Normocephalic, without obvious abnormality, atraumatic   Eyes:          Conjunctivae and sclerae normal, no icterus,     Ears:    Ears appear intact with no abnormalities noted   Throat:   No oral lesions, no thrush, oral mucosa moist   Neck:   No adenopathy, supple, trachea midline, no thyromegaly   Back:     No kyphosis present, no scoliosis present, no skin lesions,      erythema or scars, no tenderness to percussion or                   palpation,   range of motion normal   Lungs:     Clear to auscultation,respirations regular, even and                  unlabored    Heart:    Regular rhythm and normal rate, normal S1 and S2, no            murmur, no gallop, no rub, no click   Chest Wall:    No abnormalities observed   Abdomen:     Normal bowel sounds, no masses, no organomegaly, soft        moderate generalized tenderness to palpation, non-distended, with rebound and guarding   Rectal:        Extremities:   Moves all extremities well, no edema, no cyanosis, no             redness   Pulses:   Pulses palpable and equal bilaterally   Skin:   No bleeding, bruising or rash   Lymph nodes:   No  palpable adenopathy   Neurologic:   A/o x 4 with no deficits       Results Review: I have reviewed the patient's labs and imaging    LABS/IMAGING:    Imaging Results (Last 72 Hours)       Procedure Component Value Units Date/Time    CT Abdomen Pelvis Without Contrast [[id number]] Collected: 08/07/23 1548     Updated: 08/07/23 1551    Narrative:      PROCEDURE: CT ABDOMEN PELVIS WO CONTRAST     COMPARISON: None     INDICATIONS: flank pain, poor historian     TECHNIQUE: CT images were created without intravenous contrast.       PROTOCOL:   Standard imaging protocol performed      RADIATION:   DLP: 339mGy*cm    Automated exposure control was utilized to minimize radiation dose.      FINDINGS:   Lung bases are clear.  There is a small hiatal hernia.  There are bilateral nonobstructing renal   calculi up to 7 mm on the left.  Liver, spleen, pancreas, adrenal glands, gallbladder have an   unremarkable noncontrast appearance.     There appears to have been right hemicolectomy.  There is colonic diverticulosis.  In the pelvis,   just superior to the urinary bladder, there is an approximately 10 x 5 cm stool collection that is   not clearly within an intestinal loop.  This contacts the sigmoid colon.  There is an apparent   defect in the medial wall of the sigmoid colon, and some high density material that is present in   the sigmoid colon appears to be extruding into the localized stool collection.  There is   inflammatory stranding in the area of abnormality.  There is a large amount of extraperitoneal   emphysema in the pelvis, surrounding the bladder and tracking superiorly in the anterior abdominal   wall, extending into the lower chest wall on the left.  There is also extension of pelvic   extraperitoneal emphysema superiorly in the retroperitoneum including to the right of the IVC and   into the right renal hilum.  There is extension of gas into both inguinal canals and the adductor   musculature of the upper left leg.   There are a few bubbles of free pneumoperitoneum, noted in the   region of the left hepatic lobe.  The rectum is noted to be distended with stool.  There is no free   fluid in the pelvis.  There is no walled off fluid collection typical for abscess.       Impression:       There is a stool collection in the pelvis that appears to be extraluminal.  There is an   apparent defect in the wall of the sigmoid colon adjacent to the stool collection, and some high   density material in the sigmoid colon appears to extrude through the defect into the stool   collection.  Considerations include stercoral perforation or perforated diverticulum with stool   extrusion.  There is a large amount of extraperitoneal gas in the pelvis that tracks superiorly in   the abdominal wall and retroperitoneum.  There is also a small amount of pneumoperitoneum.     Additional findings include rectal stool impaction, diffuse colonic diverticulosis, left   nephrolithiasis, and a hiatal hernia     These results were discussed with Dr. Beavers in the emergency department            IAIN DENNISON MD         Electronically Signed and Approved By: IAIN DENNISON MD on 8/07/2023 at 15:47                     CT Head Without Contrast [[id number]] Collected: 08/07/23 1517     Updated: 08/07/23 1520    Narrative:      PROCEDURE: CT HEAD WO CONTRAST     COMPARISON:  None  INDICATIONS: Left-sided weakness and aphasia x3 days     PROTOCOL:   Standard imaging protocol performed      RADIATION:   DLP: 953.9mGy*cm    MA and/or KV was adjusted to minimize radiation dose.          TECHNIQUE: After obtaining the patient's consent, CT images were obtained without non-ionic   intravenous contrast material.      FINDINGS:   There is no hemorrhage, edema, or mass effect.  There are chronic small vessel ischemic white   matter changes.  The CSF spaces are age-appropriate.  There are no abnormal extra-axial fluid   collections       Impression:            1. No  intracranial hemorrhage     2. No CT changes of acute major vascular territory brain ischemia at this time             IAIN DENNISON MD         Electronically Signed and Approved By: IAIN DENNISON MD on 8/07/2023 at 15:16                     XR Chest 1 View [[id number]] Collected: 08/07/23 1248     Updated: 08/07/23 1251    Narrative:      PROCEDURE: XR CHEST 1 VW     COMPARISON: [facility], CR, XR CHEST 1 VW, 10/04/2022, 17:04.     INDICATIONS: WEAK/DIZZY/AMS TODAY     FINDINGS:   No new consolidations or pleural effusions are observed. The cardiac silhouette and mediastinum are   unchanged.  Postoperative changes are noted.  A left cardiac pacemaker/AICD is observed with leads   intact.  No definitive acute osseous abnormalities are seen on this single view.       Impression:         1. No change from the previous study with no evidence for acute cardiopulmonary process.                        ELIZABETH GARCIA MD         Electronically Signed and Approved By: ELIZABETH GARCIA MD on 8/07/2023 at 12:47                              Lab Results (last 72 hours)       Procedure Component Value Units Date/Time    High Sensitivity Troponin T 2Hr [[id number]]  (Abnormal) Collected: 08/07/23 1432    Specimen: Blood Updated: 08/07/23 1515     HS Troponin T 115 ng/L      Troponin T Delta -8 ng/L     Narrative:      High Sensitive Troponin T Reference Range:  <10.0 ng/L- Negative Female for AMI  <15.0 ng/L- Negative Male for AMI  >=10 - Abnormal Female indicating possible myocardial injury.  >=15 - Abnormal Male indicating possible myocardial injury.   Clinicians would have to utilize clinical acumen, EKG, Troponin, and serial changes to determine if it is an Acute Myocardial Infarction or myocardial injury due to an underlying chronic condition.         COVID-19,CEPHEID/SHANTHI,COR/ALONSO/PAD/NÉSTOR/MAD IN-HOUSE(OR EMERGENT/ADD-ON),NP SWAB IN TRANSPORT MEDIA 3-4 HR TAT, RT-PCR - Swab, Nasopharynx [[id number]]  (Normal)  Collected: 08/07/23 1338    Specimen: Swab from Nasopharynx Updated: 08/07/23 1437     COVID19 Not Detected    Narrative:      Fact sheet for providers: https://www.fda.gov/media/207690/download     Fact sheet for patients: https://www.fda.gov/media/127290/download  Fact sheet for providers: https://www.fda.gov/media/177618/download     Fact sheet for patients: https://www.fda.gov/media/119320/download    BNP [[id number]]  (Abnormal) Collected: 08/07/23 1220    Specimen: Blood Updated: 08/07/23 1425     proBNP 7,301.0 pg/mL     Narrative:      Among patients with dyspnea, NT-proBNP is highly sensitive for the detection of acute congestive heart failure. In addition NT-proBNP of <300 pg/ml effectively rules out acute congestive heart failure with 99% negative predictive value.      Influenza Antigen, Rapid - Swab, Nasopharynx [[id number]]  (Normal) Collected: 08/07/23 1338    Specimen: Swab from Nasopharynx Updated: 08/07/23 1418     Influenza A Ag, EIA Negative     Influenza B Ag, EIA Negative    Urine Culture - Urine, Urine, Clean Catch [[id number]] Collected: 08/07/23 1220    Specimen: Urine, Clean Catch Updated: 08/07/23 1347    Lactic Acid, Plasma [[id number]]  (Normal) Collected: 08/07/23 1316    Specimen: Blood from Arm, Left Updated: 08/07/23 1342     Lactate 1.7 mmol/L     [place] Draw [[id number]] Collected: 08/07/23 1220    Specimen: Blood Updated: 08/07/23 1330    Narrative:      The following orders were created for panel order [place] Draw.  Procedure                               Abnormality         Status                     ---------                               -----------         ------                     Green Top (Gel)[[id number]]                                  Final result               Lavender Top[[id number]]                                     Final result               Gold Top - SST[[id number]]                                   Final result               Light Blue Top[[id number]]                                    Final result                 Please view results for these tests on the individual orders.    Gold Top - SST [[id number]] Collected: 08/07/23 1220    Specimen: Blood Updated: 08/07/23 1330     Extra Tube Hold for add-ons.     Comment: Auto resulted.       Green Top (Gel) [[id number]] Collected: 08/07/23 1220    Specimen: Blood Updated: 08/07/23 1330     Extra Tube Hold for add-ons.     Comment: Auto resulted.       Light Blue Top [[id number]] Collected: 08/07/23 1220    Specimen: Blood Updated: 08/07/23 1330     Extra Tube Hold for add-ons.     Comment: Auto resulted       Lavender Top [[id number]] Collected: 08/07/23 1220    Specimen: Blood Updated: 08/07/23 1330     Extra Tube hold for add-on     Comment: Auto resulted       Blood Culture - Blood, Arm, Left [[id number]] Collected: 08/07/23 1316    Specimen: Blood from Arm, Left Updated: 08/07/23 1322    Blood Culture - Blood, Arm, Right [[id number]] Collected: 08/07/23 1316    Specimen: Blood from Arm, Right Updated: 08/07/23 1322    Urinalysis With Microscopic If Indicated (No Culture) - Urine, Clean Catch [[id number]]  (Abnormal) Collected: 08/07/23 1220    Specimen: Urine, Clean Catch Updated: 08/07/23 1302     Color, UA Yellow     Appearance, UA Cloudy     pH, UA 6.0     Specific Gravity, UA 1.016     Glucose, UA Negative     Ketones, UA Negative     Bilirubin, UA Negative     Blood, UA Trace     Protein, UA Trace     Leuk Esterase, UA Large (3+)     Nitrite, UA Negative     Urobilinogen, UA 0.2 E.U./dL    Urinalysis, Microscopic Only - Urine, Clean Catch [[id number]]  (Abnormal) Collected: 08/07/23 1220    Specimen: Urine, Clean Catch Updated: 08/07/23 1302     RBC, UA 3-5 /HPF      WBC, UA 21-30 /HPF      Bacteria, UA Trace /HPF      Squamous Epithelial Cells, UA 21-30 /HPF      Hyaline Casts, UA None Seen /LPF      Methodology Manual Light Microscopy    Single High Sensitivity Troponin T [[id number]]  (Abnormal) Collected: 08/07/23 1220     Specimen: Blood Updated: 08/07/23 1255     HS Troponin T 123 ng/L     Narrative:      High Sensitive Troponin T Reference Range:  <10.0 ng/L- Negative Female for AMI  <15.0 ng/L- Negative Male for AMI  >=10 - Abnormal Female indicating possible myocardial injury.  >=15 - Abnormal Male indicating possible myocardial injury.   Clinicians would have to utilize clinical acumen, EKG, Troponin, and serial changes to determine if it is an Acute Myocardial Infarction or myocardial injury due to an underlying chronic condition.         CBC & Differential [[id number]]  (Abnormal) Collected: 08/07/23 1220    Specimen: Blood Updated: 08/07/23 1251    Narrative:      The following orders were created for panel order CBC & Differential.  Procedure                               Abnormality         Status                     ---------                               -----------         ------                     CBC Auto Differential[[id number]]        Abnormal            Final result               Scan Slide[[id number]]                                       Final result                 Please view results for these tests on the individual orders.    CBC Auto Differential [[id number]]  (Abnormal) Collected: 08/07/23 1220    Specimen: Blood Updated: 08/07/23 1251     WBC 23.56 10*3/mm3      RBC 3.99 10*6/mm3      Hemoglobin 14.0 g/dL      Hematocrit 44.1 %      .5 fL      MCH 35.1 pg      MCHC 31.7 g/dL      RDW 13.1 %      RDW-SD 53.9 fl      MPV 10.7 fL      Platelets 280 10*3/mm3      Neutrophil % 91.3 %      Lymphocyte % 4.5 %      Monocyte % 2.9 %      Eosinophil % 0.1 %      Basophil % 0.4 %      Immature Grans % 0.8 %      Neutrophils, Absolute 21.50 10*3/mm3      Lymphocytes, Absolute 1.07 10*3/mm3      Monocytes, Absolute 0.68 10*3/mm3      Eosinophils, Absolute 0.02 10*3/mm3      Basophils, Absolute 0.09 10*3/mm3      Immature Grans, Absolute 0.20 10*3/mm3      nRBC 0.0 /100 WBC     Scan Slide [[id number]] Collected:  08/07/23 1220    Specimen: Blood Updated: 08/07/23 1251     Macrocytes Mod/2+     Toxic Granulation Slight/1+     Platelet Estimate Adequate    Magnesium [[id number]]  (Normal) Collected: 08/07/23 1220    Specimen: Blood Updated: 08/07/23 1244     Magnesium 2.1 mg/dL     Comprehensive Metabolic Panel [[id number]]  (Abnormal) Collected: 08/07/23 1220    Specimen: Blood Updated: 08/07/23 1244     Glucose 130 mg/dL      BUN 80 mg/dL      Creatinine 2.91 mg/dL      Sodium 144 mmol/L      Potassium 4.8 mmol/L      Comment: Slight hemolysis detected by analyzer. Results may be affected.        Chloride 106 mmol/L      CO2 19.9 mmol/L      Calcium 10.3 mg/dL      Total Protein 7.6 g/dL      Albumin 2.8 g/dL      ALT (SGPT) 11 U/L      AST (SGOT) 13 U/L      Alkaline Phosphatase 135 U/L      Total Bilirubin 0.5 mg/dL      Globulin 4.8 gm/dL      A/G Ratio 0.6 g/dL      BUN/Creatinine Ratio 27.5     Anion Gap 18.1 mmol/L      eGFR 15.1 mL/min/1.73     Narrative:      GFR Normal >60  Chronic Kidney Disease <60  Kidney Failure <15    The GFR formula is only valid for adults with stable renal function between ages 18 and 70.    POC Glucose Once [[id number]]  (Abnormal) Collected: 08/07/23 1158    Specimen: Blood Updated: 08/07/23 1229     Glucose 122 mg/dL      Comment: Serial Number: [id number]:  [id number]                  Result Review :     Assessment & Plan     * No active hospital problems. *     Sepsis present on arrival  Acute kidney injury present on arrival  Altered mental status  Pneumoperitoneum with likely perforated sigmoid with peritonitis    I have reviewed the patient's work-up with results mentioned above.  Recommend admission to hospitalist.  Recommend Zosyn.  Discussion with family and POA.  We discussed the surgery versus keeping the patient comfortable.  I discussed exploratory laparotomy with sigmoid colectomy, end colostomy.  I discussed the procedure, recovery, risk benefits and alternatives.  All of  their questions were answered.  They wish to take the patient home and keep her comfortable.  The ER physician and hospitalist will help facilitate hospice referral and pain medication.  All of their questions were answered.          Zia Abrams MD  08/07/23 16:13 EDT

## 2023-08-12 LAB
BACTERIA SPEC AEROBE CULT: NORMAL
BACTERIA SPEC AEROBE CULT: NORMAL

## 2023-12-18 NOTE — TELEPHONE ENCOUNTER
Pt called and informed    Spoke with patient and informed that [employer] has been approved by insurance.    Approval letter has been indexed via onbase.

## 2025-02-01 NOTE — PROGRESS NOTES
Chief Complaint    Fabiana Herbert is a 87 y.o. female who presents to [facility] for follow-up of chronic constipation, esophageal reflux, bloating. Patient has previously tried Linzess which caused too much diarrhea. She currently takes MiraLAX once daily and a stool softener and overall is happy with her control of constipation. For her reflux she had previously taken Nexium but now is using Protonix and thinks this does make her symptoms better. 2-3 times per week she will use Pepcid and this seems to help her bloating. The patient does note that she has had intermittent dark stools for the past 10 to 14 days. She denies epigastric pain, nausea or vomiting. She did have lab work done this morning after telling her primary care physician about possible melena    Result Review :     The following data was reviewed by: Stanislav Anderson MD on 02/15/2022:    CMP    CMP 12/29/21 1/24/22 2/1/22   Glucose 123 (A) 97    BUN 28 (A) 28 (A)    Creatinine 1.40 (A) 1.60 (A) 1.40   eGFR Non  Am 36 (A) 30 (A)    Sodium 139 140    Potassium 4.8 4.1    Chloride 101 101    Calcium 10.3 10.4    Albumin 4.60 4.60    Total Bilirubin 0.5 0.4    Alkaline Phosphatase 56 89    AST (SGOT) 19 24    ALT (SGPT) 13 13    (A) Abnormal value       Comments are available for some flowsheets but are not being displayed.           CBC    CBC 3/15/21 12/29/21 1/24/22   WBC 5.39 8.70 8.08   RBC 4.04 (A) 4.04 4.12   Hemoglobin 13.4 14.0 14.6   Hematocrit 41.3 42.1 44.0   .2 (A) 104.2 (A) 106.8 (A)   MCH 33.2 (A) 34.7 (A) 35.4 (A)   MCHC 32.4 (A) 33.3 33.2   RDW 12.2 12.3 12.2 (A)   Platelets 198 185 231   (A) Abnormal value              Data reviewed: GI studies Previous EGD and colonoscopy reviewed     Past Medical History:   Diagnosis Date   • Anemia    • Anxiety    • Arthritis     spine   • Back pain    • Cramps, muscle, general    • Degenerative lumbar disc 05/04/2014    LUMBAR /LS DISC  DEGENERATION   • Difficulty in swallowing    • Dizziness    • Dysuria 05/24/2019   • EKG abnormality 08/12/2016   • Fatigue    • Headache    • Hearing loss    • Hyperlipidemia    • Hypertension, essential    • Hypothyroidism    • Imbalance    • Incontinence in female    • Ingrowing toenail    • Insomnia    • Iron deficiency anemia 02/01/2017   • Joint pain    • Leg pain    • Lightheadedness 03/14/2018    The patient reports onset of lightheadedness following heart surgery in 2016. She notes that this tends to be brought on by a change in position. I would consider orthostasis as a potential culprit. I will attempt to pursue orthostatic blood pressures but the patient notes that it is hard for her to lay flaat on the exam table. I will request her records be sent for my review.    • Low back pain 06/09/2014   • Lumbar degenerative disc disease 02/01/2017   • Macular degeneration     Dharmesh   • Mitral valve disorder    • Mitral valve replaced 12/07/2017   • Nocturia 05/24/2019   • Pacemaker 02/23/2018   • Peptic ulcer disease 02/01/2017   • Ringing in ear    • Sciatica 01/09/2015   • Shortness of breath    • Urethral caruncle 05/24/2019   • Vision changes    • Weakness        Past Surgical History:   Procedure Laterality Date   • APPENDECTOMY  1951   • BREAST BIOPSY Left 1955   • CATARACT EXTRACTION WITH INTRAOCULAR LENS IMPLANT Bilateral     1992, 2001   • COLON SURGERY  2018    Dr. Alonso, Moderate Diverticulosis   • COLONOSCOPY     • CYSTOSCOPY  02/18/2020    excision of urethral prolapse w/ Dr. Ibanez   • CYSTOSCOPY  02/18/2020    Excision of Urethral Prolapse w/ Dr. Ibanez   • ENDOSCOPY  2018   • HEMORRHOIDECTOMY      2009/ 2014 hemorrhoid banding    • HYSTERECTOMY  1960    total    • LUMBAR LAMINECTOMY  07/2007   • MITRAL VALVE REPLACEMENT  11/17/2016    Dr. Singh in [place]   • PACEMAKER IMPLANTATION  11/2016    MRI compatible   • WRIST SURGERY Left        Social History     Social History  "Narrative   • Not on file       Objective     Vital Signs:   /83   Pulse 77   Ht 157.5 cm (62\")   Wt 45.4 kg (100 lb)   SpO2 98%   BMI 18.29 kg/m²     Body mass index is 18.29 kg/m².    Physical Exam            Assessment and Plan    Diagnoses and all orders for this visit:    1. Constipation, unspecified constipation type (Primary)    2. Hemorrhoids, unspecified hemorrhoid type    3. Gastroesophageal reflux disease without esophagitis    Overall the patient's constipation and reflux symptoms are well controlled with daily MiraLAX and Protonix with Pepcid. We will await the results of her CBC from today to ensure she does not have a decreased hemoglobin. If she has a dropping hemoglobin then we will consider upper endoscopy in the very near future. I recommended the patient to continue taking her Protonix. She does not use any NSAIDs and is adamant that she only uses Tylenol for pain control.            Follow Up   No follow-ups on file.  Patient was given instructions and counseling regarding her condition or for health maintenance advice. Please see specific information pulled into the AVS if appropriate.     " Patient